# Patient Record
Sex: FEMALE | Race: WHITE | Employment: FULL TIME
[De-identification: names, ages, dates, MRNs, and addresses within clinical notes are randomized per-mention and may not be internally consistent; named-entity substitution may affect disease eponyms.]

---

## 2017-02-15 ENCOUNTER — TELEPHONE (OUTPATIENT)
Dept: OBGYN | Facility: CLINIC | Age: 42
End: 2017-02-15

## 2017-03-21 ENCOUNTER — OFFICE VISIT (OUTPATIENT)
Dept: OBGYN CLINIC | Age: 42
End: 2017-03-21
Payer: COMMERCIAL

## 2017-03-21 ENCOUNTER — HOSPITAL ENCOUNTER (OUTPATIENT)
Age: 42
Setting detail: SPECIMEN
Discharge: HOME | End: 2017-03-21
Payer: COMMERCIAL

## 2017-03-21 VITALS
SYSTOLIC BLOOD PRESSURE: 132 MMHG | WEIGHT: 194 LBS | DIASTOLIC BLOOD PRESSURE: 86 MMHG | HEIGHT: 61 IN | HEART RATE: 74 BPM | RESPIRATION RATE: 16 BRPM | BODY MASS INDEX: 36.63 KG/M2

## 2017-03-21 DIAGNOSIS — N76.0 ACUTE VAGINITIS: Primary | ICD-10-CM

## 2017-03-21 LAB
DIRECT EXAM: ABNORMAL
Lab: ABNORMAL
SPECIMEN DESCRIPTION: ABNORMAL
SPECIMEN DESCRIPTION: ABNORMAL
STATUS: ABNORMAL

## 2017-03-21 PROCEDURE — 87660 TRICHOMONAS VAGIN DIR PROBE: CPT

## 2017-03-21 PROCEDURE — 87591 N.GONORRHOEAE DNA AMP PROB: CPT

## 2017-03-21 PROCEDURE — 87480 CANDIDA DNA DIR PROBE: CPT

## 2017-03-21 PROCEDURE — 87491 CHLMYD TRACH DNA AMP PROBE: CPT

## 2017-03-21 PROCEDURE — 87510 GARDNER VAG DNA DIR PROBE: CPT

## 2017-03-21 PROCEDURE — 99213 OFFICE O/P EST LOW 20 MIN: CPT | Performed by: NURSE PRACTITIONER

## 2017-03-21 RX ORDER — CLOTRIMAZOLE AND BETAMETHASONE DIPROPIONATE 10; .64 MG/G; MG/G
CREAM TOPICAL
Qty: 1 TUBE | Refills: 1 | Status: SHIPPED | OUTPATIENT
Start: 2017-03-21 | End: 2017-05-22 | Stop reason: ALTCHOICE

## 2017-03-22 ENCOUNTER — TELEPHONE (OUTPATIENT)
Dept: OBGYN CLINIC | Age: 42
End: 2017-03-22

## 2017-03-22 LAB
C TRACH DNA GENITAL QL NAA+PROBE: NEGATIVE
N. GONORRHOEAE DNA: NEGATIVE

## 2017-03-22 RX ORDER — METRONIDAZOLE 500 MG/1
500 TABLET ORAL 2 TIMES DAILY
Qty: 14 TABLET | Refills: 0 | Status: SHIPPED | OUTPATIENT
Start: 2017-03-22 | End: 2017-03-29

## 2017-04-24 ENCOUNTER — TELEPHONE (OUTPATIENT)
Dept: BARIATRICS/WEIGHT MGMT | Age: 42
End: 2017-04-24

## 2017-05-22 ENCOUNTER — HOSPITAL ENCOUNTER (OUTPATIENT)
Age: 42
Setting detail: SPECIMEN
Discharge: HOME | End: 2017-05-22
Payer: COMMERCIAL

## 2017-05-22 ENCOUNTER — OFFICE VISIT (OUTPATIENT)
Dept: OBGYN CLINIC | Age: 42
End: 2017-05-22
Payer: COMMERCIAL

## 2017-05-22 ENCOUNTER — OFFICE VISIT (OUTPATIENT)
Dept: FAMILY MEDICINE CLINIC | Age: 42
End: 2017-05-22
Payer: COMMERCIAL

## 2017-05-22 ENCOUNTER — HOSPITAL ENCOUNTER (OUTPATIENT)
Age: 42
Discharge: HOME | End: 2017-05-22
Payer: COMMERCIAL

## 2017-05-22 VITALS
DIASTOLIC BLOOD PRESSURE: 89 MMHG | HEIGHT: 61 IN | HEART RATE: 81 BPM | TEMPERATURE: 97.3 F | WEIGHT: 200 LBS | BODY MASS INDEX: 37.76 KG/M2 | SYSTOLIC BLOOD PRESSURE: 132 MMHG | RESPIRATION RATE: 18 BRPM | OXYGEN SATURATION: 100 %

## 2017-05-22 VITALS
BODY MASS INDEX: 37.76 KG/M2 | WEIGHT: 200 LBS | HEIGHT: 61 IN | DIASTOLIC BLOOD PRESSURE: 89 MMHG | SYSTOLIC BLOOD PRESSURE: 132 MMHG | HEART RATE: 72 BPM

## 2017-05-22 DIAGNOSIS — Z12.39 ENCOUNTER FOR BREAST CANCER SCREENING OTHER THAN MAMMOGRAM: ICD-10-CM

## 2017-05-22 DIAGNOSIS — R73.9 HYPERGLYCEMIA: ICD-10-CM

## 2017-05-22 DIAGNOSIS — Z01.419 WELL WOMAN EXAM WITH ROUTINE GYNECOLOGICAL EXAM: Primary | ICD-10-CM

## 2017-05-22 DIAGNOSIS — R14.0 ABDOMINAL BLOATING: ICD-10-CM

## 2017-05-22 DIAGNOSIS — Z98.890 HISTORY OF ENDOMETRIAL ABLATION: ICD-10-CM

## 2017-05-22 DIAGNOSIS — R68.81 EARLY SATIETY: ICD-10-CM

## 2017-05-22 DIAGNOSIS — E66.9 OBESITY (BMI 30-39.9): ICD-10-CM

## 2017-05-22 DIAGNOSIS — Z23 NEED FOR TDAP VACCINATION: ICD-10-CM

## 2017-05-22 DIAGNOSIS — Z11.51 SPECIAL SCREENING EXAMINATION FOR HUMAN PAPILLOMAVIRUS (HPV): ICD-10-CM

## 2017-05-22 DIAGNOSIS — R92.2 DENSE BREAST: ICD-10-CM

## 2017-05-22 DIAGNOSIS — R73.03 PREDIABETES: ICD-10-CM

## 2017-05-22 DIAGNOSIS — R63.5 UNINTENDED WEIGHT GAIN: Primary | ICD-10-CM

## 2017-05-22 DIAGNOSIS — R63.5 UNINTENDED WEIGHT GAIN: ICD-10-CM

## 2017-05-22 PROBLEM — Z87.442 HISTORY OF KIDNEY STONES: Status: ACTIVE | Noted: 2017-05-22

## 2017-05-22 PROBLEM — E78.1 HYPERTRIGLYCERIDEMIA: Status: ACTIVE | Noted: 2017-05-22

## 2017-05-22 PROBLEM — E78.5 DYSLIPIDEMIA (HIGH LDL; LOW HDL): Status: ACTIVE | Noted: 2017-05-22

## 2017-05-22 LAB
ALBUMIN SERPL-MCNC: 4.4 G/DL (ref 3.5–5.2)
ALBUMIN/GLOBULIN RATIO: ABNORMAL (ref 1–2.5)
ALP BLD-CCNC: 66 U/L (ref 35–104)
ALT SERPL-CCNC: 15 U/L (ref 5–33)
ANION GAP SERPL CALCULATED.3IONS-SCNC: 12 MMOL/L (ref 9–17)
AST SERPL-CCNC: 12 U/L
BILIRUB SERPL-MCNC: 0.46 MG/DL (ref 0.3–1.2)
BUN BLDV-MCNC: 8 MG/DL (ref 6–20)
BUN/CREAT BLD: 13 (ref 9–20)
CALCIUM SERPL-MCNC: 9.5 MG/DL (ref 8.6–10.4)
CHLORIDE BLD-SCNC: 97 MMOL/L (ref 98–107)
CHOLESTEROL/HDL RATIO: 4.6
CHOLESTEROL: 226 MG/DL
CO2: 28 MMOL/L (ref 20–31)
CREAT SERPL-MCNC: 0.63 MG/DL (ref 0.5–0.9)
GFR AFRICAN AMERICAN: >60 ML/MIN
GFR NON-AFRICAN AMERICAN: >60 ML/MIN
GFR SERPL CREATININE-BSD FRML MDRD: ABNORMAL ML/MIN/{1.73_M2}
GFR SERPL CREATININE-BSD FRML MDRD: ABNORMAL ML/MIN/{1.73_M2}
GLUCOSE BLD-MCNC: 106 MG/DL (ref 70–99)
HBA1C MFR BLD: 6.1 %
HDLC SERPL-MCNC: 49 MG/DL
LDL CHOLESTEROL: 128 MG/DL (ref 0–130)
POTASSIUM SERPL-SCNC: 4.2 MMOL/L (ref 3.7–5.3)
SODIUM BLD-SCNC: 137 MMOL/L (ref 135–144)
TOTAL PROTEIN: 7.8 G/DL (ref 6.4–8.3)
TRIGL SERPL-MCNC: 246 MG/DL
TSH SERPL DL<=0.05 MIU/L-ACNC: 2.37 MIU/L (ref 0.3–5)
VLDLC SERPL CALC-MCNC: ABNORMAL MG/DL (ref 1–30)

## 2017-05-22 PROCEDURE — 99396 PREV VISIT EST AGE 40-64: CPT | Performed by: NURSE PRACTITIONER

## 2017-05-22 PROCEDURE — 84443 ASSAY THYROID STIM HORMONE: CPT

## 2017-05-22 PROCEDURE — 87624 HPV HI-RISK TYP POOLED RSLT: CPT

## 2017-05-22 PROCEDURE — 99203 OFFICE O/P NEW LOW 30 MIN: CPT | Performed by: FAMILY MEDICINE

## 2017-05-22 PROCEDURE — 36415 COLL VENOUS BLD VENIPUNCTURE: CPT

## 2017-05-22 PROCEDURE — 80053 COMPREHEN METABOLIC PANEL: CPT

## 2017-05-22 PROCEDURE — 80061 LIPID PANEL: CPT

## 2017-05-22 PROCEDURE — G0145 SCR C/V CYTO,THINLAYER,RESCR: HCPCS

## 2017-05-22 PROCEDURE — 83036 HEMOGLOBIN GLYCOSYLATED A1C: CPT | Performed by: FAMILY MEDICINE

## 2017-05-22 RX ORDER — METFORMIN HYDROCHLORIDE 500 MG/1
500 TABLET, EXTENDED RELEASE ORAL
Qty: 30 TABLET | Refills: 3 | Status: SHIPPED | OUTPATIENT
Start: 2017-05-22 | End: 2018-02-05

## 2017-05-22 ASSESSMENT — ENCOUNTER SYMPTOMS
SHORTNESS OF BREATH: 0
CONSTIPATION: 0
CHEST TIGHTNESS: 0
ABDOMINAL PAIN: 0
WHEEZING: 0
VOMITING: 0
NAUSEA: 0
ABDOMINAL DISTENTION: 0
DIARRHEA: 0
COUGH: 0

## 2017-05-22 ASSESSMENT — PATIENT HEALTH QUESTIONNAIRE - PHQ9
SUM OF ALL RESPONSES TO PHQ9 QUESTIONS 1 & 2: 0
1. LITTLE INTEREST OR PLEASURE IN DOING THINGS: 0
SUM OF ALL RESPONSES TO PHQ9 QUESTIONS 1 & 2: 0
SUM OF ALL RESPONSES TO PHQ QUESTIONS 1-9: 0
2. FEELING DOWN, DEPRESSED OR HOPELESS: 0
2. FEELING DOWN, DEPRESSED OR HOPELESS: 0
1. LITTLE INTEREST OR PLEASURE IN DOING THINGS: 0
SUM OF ALL RESPONSES TO PHQ QUESTIONS 1-9: 0

## 2017-05-23 LAB
CYTOLOGY REPORT: NORMAL
HPV SAMPLE: NORMAL
HPV SOURCE: NORMAL
HPV, GENOTYPE 16: NOT DETECTED
HPV, GENOTYPE 18: NOT DETECTED
HPV, HIGH RISK OTHER: NOT DETECTED
HPV, INTERPRETATION: NORMAL

## 2017-06-06 RX ORDER — METRONIDAZOLE 500 MG/1
500 TABLET ORAL 2 TIMES DAILY
Qty: 14 TABLET | Refills: 0 | Status: SHIPPED | OUTPATIENT
Start: 2017-06-06 | End: 2017-06-13

## 2017-06-22 ENCOUNTER — OFFICE VISIT (OUTPATIENT)
Dept: FAMILY MEDICINE CLINIC | Age: 42
End: 2017-06-22
Payer: COMMERCIAL

## 2017-06-22 ENCOUNTER — TELEPHONE (OUTPATIENT)
Dept: FAMILY MEDICINE CLINIC | Age: 42
End: 2017-06-22

## 2017-06-22 VITALS
SYSTOLIC BLOOD PRESSURE: 119 MMHG | BODY MASS INDEX: 37.16 KG/M2 | DIASTOLIC BLOOD PRESSURE: 80 MMHG | WEIGHT: 196.8 LBS | HEIGHT: 61 IN | TEMPERATURE: 98.2 F | OXYGEN SATURATION: 97 % | HEART RATE: 91 BPM

## 2017-06-22 DIAGNOSIS — E78.5 DYSLIPIDEMIA (HIGH LDL; LOW HDL): ICD-10-CM

## 2017-06-22 DIAGNOSIS — R73.03 PREDIABETES: ICD-10-CM

## 2017-06-22 DIAGNOSIS — E66.9 OBESITY (BMI 30-39.9): Primary | ICD-10-CM

## 2017-06-22 DIAGNOSIS — R73.9 HYPERGLYCEMIA: ICD-10-CM

## 2017-06-22 DIAGNOSIS — E78.1 HYPERTRIGLYCERIDEMIA: ICD-10-CM

## 2017-06-22 PROCEDURE — 99214 OFFICE O/P EST MOD 30 MIN: CPT | Performed by: FAMILY MEDICINE

## 2017-06-22 RX ORDER — PHENTERMINE HYDROCHLORIDE 37.5 MG/1
37.5 CAPSULE ORAL EVERY MORNING
Qty: 30 CAPSULE | Refills: 0 | Status: SHIPPED | OUTPATIENT
Start: 2017-06-22 | End: 2017-06-22 | Stop reason: SDUPTHER

## 2017-06-22 RX ORDER — PHENTERMINE HYDROCHLORIDE 37.5 MG/1
37.5 CAPSULE ORAL EVERY MORNING
Qty: 30 CAPSULE | Refills: 0 | Status: SHIPPED | OUTPATIENT
Start: 2017-06-22 | End: 2017-07-22

## 2017-06-22 ASSESSMENT — ENCOUNTER SYMPTOMS
COUGH: 0
CONSTIPATION: 0
ABDOMINAL PAIN: 0
DIARRHEA: 0
WHEEZING: 0
ABDOMINAL DISTENTION: 0
NAUSEA: 0
VOMITING: 0
CHEST TIGHTNESS: 0
SHORTNESS OF BREATH: 0

## 2017-10-06 ENCOUNTER — HOSPITAL ENCOUNTER (OUTPATIENT)
Dept: ULTRASOUND IMAGING | Age: 42
Discharge: HOME | End: 2017-10-06
Payer: COMMERCIAL

## 2017-10-06 ENCOUNTER — HOSPITAL ENCOUNTER (OUTPATIENT)
Dept: MAMMOGRAPHY | Age: 42
Discharge: HOME | End: 2017-10-06
Payer: COMMERCIAL

## 2017-10-06 DIAGNOSIS — R92.2 DENSE BREAST: ICD-10-CM

## 2017-10-06 DIAGNOSIS — Z01.419 WELL WOMAN EXAM WITH ROUTINE GYNECOLOGICAL EXAM: ICD-10-CM

## 2017-10-06 DIAGNOSIS — Z12.39 ENCOUNTER FOR BREAST CANCER SCREENING OTHER THAN MAMMOGRAM: ICD-10-CM

## 2017-10-06 PROCEDURE — G0279 TOMOSYNTHESIS, MAMMO: HCPCS

## 2018-02-05 ENCOUNTER — HOSPITAL ENCOUNTER (OUTPATIENT)
Age: 43
Discharge: HOME | End: 2018-02-05
Payer: COMMERCIAL

## 2018-02-05 ENCOUNTER — OFFICE VISIT (OUTPATIENT)
Dept: FAMILY MEDICINE CLINIC | Age: 43
End: 2018-02-05
Payer: COMMERCIAL

## 2018-02-05 VITALS
TEMPERATURE: 97.6 F | BODY MASS INDEX: 38.78 KG/M2 | DIASTOLIC BLOOD PRESSURE: 86 MMHG | WEIGHT: 205.4 LBS | SYSTOLIC BLOOD PRESSURE: 133 MMHG | OXYGEN SATURATION: 96 % | HEIGHT: 61 IN | HEART RATE: 80 BPM

## 2018-02-05 DIAGNOSIS — R63.5 UNINTENDED WEIGHT GAIN: ICD-10-CM

## 2018-02-05 DIAGNOSIS — E66.9 OBESITY (BMI 30-39.9): ICD-10-CM

## 2018-02-05 DIAGNOSIS — E78.1 HYPERTRIGLYCERIDEMIA: ICD-10-CM

## 2018-02-05 DIAGNOSIS — E78.5 DYSLIPIDEMIA (HIGH LDL; LOW HDL): ICD-10-CM

## 2018-02-05 DIAGNOSIS — R73.9 HYPERGLYCEMIA: ICD-10-CM

## 2018-02-05 DIAGNOSIS — E66.9 OBESITY (BMI 30-39.9): Primary | ICD-10-CM

## 2018-02-05 LAB
ALBUMIN SERPL-MCNC: 4 G/DL (ref 3.5–5.2)
ALBUMIN/GLOBULIN RATIO: ABNORMAL (ref 1–2.5)
ALP BLD-CCNC: 69 U/L (ref 35–104)
ALT SERPL-CCNC: 15 U/L (ref 5–33)
ANION GAP SERPL CALCULATED.3IONS-SCNC: 11 MMOL/L (ref 9–17)
AST SERPL-CCNC: 12 U/L
BILIRUB SERPL-MCNC: 0.37 MG/DL (ref 0.3–1.2)
BUN BLDV-MCNC: 11 MG/DL (ref 6–20)
BUN/CREAT BLD: ABNORMAL (ref 9–20)
CALCIUM SERPL-MCNC: 9.1 MG/DL (ref 8.6–10.4)
CHLORIDE BLD-SCNC: 100 MMOL/L (ref 98–107)
CO2: 27 MMOL/L (ref 20–31)
CREAT SERPL-MCNC: 0.7 MG/DL (ref 0.5–0.9)
GFR AFRICAN AMERICAN: >60 ML/MIN
GFR NON-AFRICAN AMERICAN: >60 ML/MIN
GFR SERPL CREATININE-BSD FRML MDRD: ABNORMAL ML/MIN/{1.73_M2}
GFR SERPL CREATININE-BSD FRML MDRD: ABNORMAL ML/MIN/{1.73_M2}
GLUCOSE BLD-MCNC: 108 MG/DL (ref 70–99)
POTASSIUM SERPL-SCNC: 4 MMOL/L (ref 3.7–5.3)
SODIUM BLD-SCNC: 138 MMOL/L (ref 135–144)
TOTAL PROTEIN: 7.8 G/DL (ref 6.4–8.3)
TSH SERPL DL<=0.05 MIU/L-ACNC: 1.23 MIU/L (ref 0.3–5)

## 2018-02-05 PROCEDURE — 80053 COMPREHEN METABOLIC PANEL: CPT

## 2018-02-05 PROCEDURE — 36415 COLL VENOUS BLD VENIPUNCTURE: CPT

## 2018-02-05 PROCEDURE — 83036 HEMOGLOBIN GLYCOSYLATED A1C: CPT

## 2018-02-05 PROCEDURE — 84443 ASSAY THYROID STIM HORMONE: CPT

## 2018-02-05 PROCEDURE — 99214 OFFICE O/P EST MOD 30 MIN: CPT | Performed by: FAMILY MEDICINE

## 2018-02-05 RX ORDER — PHENTERMINE HYDROCHLORIDE 37.5 MG/1
37.5 TABLET ORAL
Qty: 30 TABLET | Refills: 0 | Status: SHIPPED | OUTPATIENT
Start: 2018-02-05 | End: 2018-03-14 | Stop reason: SDUPTHER

## 2018-02-05 ASSESSMENT — ENCOUNTER SYMPTOMS
VOMITING: 0
COUGH: 0
DIARRHEA: 0
ABDOMINAL DISTENTION: 0
WHEEZING: 0
NAUSEA: 0
SHORTNESS OF BREATH: 0
CONSTIPATION: 0
CHEST TIGHTNESS: 0
ABDOMINAL PAIN: 0

## 2018-02-05 NOTE — PATIENT INSTRUCTIONS
loss goals. ¨ A dietitian can help you make healthy changes in your diet. ¨ An exercise specialist or  can help you develop a safe and effective exercise program.  ¨ A counselor or psychiatrist can help you cope with issues such as depression, anxiety, or family problems that can make it hard to focus on weight loss. · Consider joining a support group for people who are trying to lose weight. Your doctor can suggest groups in your area. Where can you learn more? Go to https://BodBot.GMG33. org and sign in to your ShopItToMe account. Enter T470 in the BrandMaker box to learn more about \"Starting a Weight Loss Plan: Care Instructions. \"     If you do not have an account, please click on the \"Sign Up Now\" link. Current as of: October 13, 2016  Content Version: 11.5  © 6738-1040 Healthwise, Incorporated. Care instructions adapted under license by [facility] ([facility]). If you have questions about a medical condition or this instruction, always ask your healthcare professional. [address] any warranty or liability for your use of this information.

## 2018-02-05 NOTE — PROGRESS NOTES
Visit Information    Have you changed or started any medications since your last visit including any over-the-counter medicines, vitamins, or herbal medicines? no   Have you stopped taking any of your medications? Is so, why? -  no  Are you having any side effects from any of your medications? - no    Have you seen any other physician or provider since your last visit?  no   Have you had any other diagnostic tests since your last visit?  no   Have you been seen in the emergency room and/or had an admission in a hospital since we last saw you?  no   Have you had your routine dental cleaning in the past 6 months?  yes -      Do you have an active MyChart account? If no, what is the barrier?   Yes    Patient Care Team:  Luis Ron MD as PCP - General (Family Medicine)  Luis Ron MD as PCP - S Attributed Provider  Shakir Katz CNP as Nurse Practitioner (Certified Nurse Practitioner)  Bulmaro Jacinto MD as Consulting Physician (Urology)    Medical History Review  Past Medical, Family, and Social History reviewed and does contribute to the patient presenting condition    Health Maintenance   Topic Date Due    Flu vaccine (1) 09/01/2017    DTaP/Tdap/Td vaccine (1 - Tdap) 07/03/2022 (Originally 2/15/2017)    A1C test (Diabetic or Prediabetic)  05/22/2018    Lipid screen  05/22/2022    Cervical cancer screen  05/22/2022    HIV screen  Completed
Hypertriglyceridemia  Attempt to lose weight  - phentermine (ADIPEX-P) 37.5 MG tablet; Take 1 tablet by mouth every morning (before breakfast) for 30 days. Dispense: 30 tablet; Refill: 0      Orders Placed This Encounter   Procedures    Hemoglobin A1C     Standing Status:   Future     Standing Expiration Date:   2/5/2019    Comprehensive Metabolic Panel     Standing Status:   Future     Standing Expiration Date:   2/5/2019    TSH without Reflex     Standing Status:   Future     Standing Expiration Date:   2/6/2019       Orders Placed This Encounter   Medications    phentermine (ADIPEX-P) 37.5 MG tablet     Sig: Take 1 tablet by mouth every morning (before breakfast) for 30 days. Dispense:  30 tablet     Refill:  0       Medications Discontinued During This Encounter   Medication Reason    metFORMIN (GLUCOPHAGE XR) 500 MG extended release tablet Patient Choice       Crystal received counseling on the following healthy behaviors: nutrition, exercise and medication adherence, weight loss  Reviewed prior labs and health maintenance  Continue current medications, diet and exercise. Discussed use, benefit, and side effects of prescribed medications. Barriers to medication compliance addressed. Patient given educational materials - see patient instructions  Was a self-tracking handout given in paper form or via [employer]? No:    Requested Prescriptions     Signed Prescriptions Disp Refills    phentermine (ADIPEX-P) 37.5 MG tablet 30 tablet 0     Sig: Take 1 tablet by mouth every morning (before breakfast) for 30 days. All patient questions answered. Patient voiced understanding. Quality Measures    Body mass index is 38.81 kg/m². Elevated. Weight control planned discussed conventional weight loss, start ADipex,  and Healthy diet and regular exercise.     BP: 133/86 Blood pressure is normal. Treatment plan consists of Dietary Sodium Restriction, avoid cofee and No treatment change

## 2018-02-06 DIAGNOSIS — R63.5 UNINTENDED WEIGHT GAIN: ICD-10-CM

## 2018-02-06 DIAGNOSIS — R73.03 PREDIABETES: Primary | ICD-10-CM

## 2018-02-06 DIAGNOSIS — E66.9 OBESITY (BMI 30-39.9): ICD-10-CM

## 2018-02-06 DIAGNOSIS — R73.9 HYPERGLYCEMIA: ICD-10-CM

## 2018-02-06 LAB
ESTIMATED AVERAGE GLUCOSE: 131 MG/DL
HBA1C MFR BLD: 6.2 % (ref 4–6)

## 2018-02-06 RX ORDER — METFORMIN HYDROCHLORIDE 500 MG/1
500 TABLET, EXTENDED RELEASE ORAL
Qty: 90 TABLET | Refills: 3 | Status: SHIPPED | OUTPATIENT
Start: 2018-02-06 | End: 2018-03-14 | Stop reason: SINTOL

## 2018-02-06 NOTE — PROGRESS NOTES
Mychart comment sent to patient. Glucose 108, mildly increased. Metformin would be indicated.   Other labs within normal limits pending A1c

## 2018-03-13 ENCOUNTER — TELEPHONE (OUTPATIENT)
Dept: FAMILY MEDICINE CLINIC | Age: 43
End: 2018-03-13

## 2018-03-14 ENCOUNTER — OFFICE VISIT (OUTPATIENT)
Dept: FAMILY MEDICINE CLINIC | Age: 43
End: 2018-03-14
Payer: COMMERCIAL

## 2018-03-14 VITALS
HEIGHT: 61 IN | SYSTOLIC BLOOD PRESSURE: 141 MMHG | TEMPERATURE: 97.4 F | WEIGHT: 195.2 LBS | DIASTOLIC BLOOD PRESSURE: 95 MMHG | HEART RATE: 101 BPM | OXYGEN SATURATION: 97 % | BODY MASS INDEX: 36.85 KG/M2

## 2018-03-14 DIAGNOSIS — E78.1 HYPERTRIGLYCERIDEMIA: ICD-10-CM

## 2018-03-14 DIAGNOSIS — R73.03 PREDIABETES: ICD-10-CM

## 2018-03-14 DIAGNOSIS — R03.0 ELEVATED BLOOD PRESSURE READING: ICD-10-CM

## 2018-03-14 DIAGNOSIS — R73.9 HYPERGLYCEMIA: ICD-10-CM

## 2018-03-14 DIAGNOSIS — E66.9 OBESITY (BMI 30-39.9): Primary | ICD-10-CM

## 2018-03-14 DIAGNOSIS — E78.5 DYSLIPIDEMIA (HIGH LDL; LOW HDL): ICD-10-CM

## 2018-03-14 PROCEDURE — 99214 OFFICE O/P EST MOD 30 MIN: CPT | Performed by: FAMILY MEDICINE

## 2018-03-14 RX ORDER — PHENTERMINE HYDROCHLORIDE 37.5 MG/1
37.5 TABLET ORAL
Qty: 30 TABLET | Refills: 0 | Status: SHIPPED | OUTPATIENT
Start: 2018-03-14 | End: 2018-03-14 | Stop reason: SDUPTHER

## 2018-03-14 RX ORDER — PHENTERMINE HYDROCHLORIDE 37.5 MG/1
37.5 TABLET ORAL
Qty: 30 TABLET | Refills: 0 | Status: SHIPPED | OUTPATIENT
Start: 2018-03-14 | End: 2018-04-13

## 2018-03-14 ASSESSMENT — ENCOUNTER SYMPTOMS
COUGH: 0
CHEST TIGHTNESS: 0
ABDOMINAL PAIN: 1
WHEEZING: 0
NAUSEA: 1
ABDOMINAL DISTENTION: 0
CONSTIPATION: 0
SHORTNESS OF BREATH: 0
BLOOD IN STOOL: 0
VOMITING: 1
DIARRHEA: 1

## 2018-03-14 ASSESSMENT — PATIENT HEALTH QUESTIONNAIRE - PHQ9
SUM OF ALL RESPONSES TO PHQ9 QUESTIONS 1 & 2: 0
1. LITTLE INTEREST OR PLEASURE IN DOING THINGS: 0
2. FEELING DOWN, DEPRESSED OR HOPELESS: 0
SUM OF ALL RESPONSES TO PHQ QUESTIONS 1-9: 0

## 2018-03-14 NOTE — PROGRESS NOTES
Chief Complaint   Patient presents with    Weight Management     adipex #2         Guevara Chavez  here today for follow up on chronic medical problems, go over labs and/or diagnostic studies, and medication refills. Weight Management (adipex #2)      HPI    Wants to lose weight. Guevara Chavez was started on Adipex  In addition to the medication, patient also using diet and exercise as follows:  -diet: Low carb, low fat, smaller portions, has been eating more fruits and vegetables  -exercise: walking more and she has a very active job as an [employer]    Medication side effects: None. Patient denies anorexia, nausea, vomiting, abdominal pain, involuntary weight loss, palpitations, insomnia, irritability, anxiety, headache and tremor. Patient informed that when prescribed a controlled substance for weight loss, the provider is required by law to see the patient for an appointment every thirty days. This is neccessary to record the weight and blood pressure and to assess patient's efforts to lose weight, and to ensure there are no contraindications or adverse effects. High BP today  Celia denies chest pain, chest pressure/discomfort, claudication, dyspnea, exertional chest pressure/discomfort, fatigue, irregular heart beat, lower extremity edema, near-syncope, orthopnea, palpitations, paroxysmal nocturnal dyspnea, syncope and tachypnea      Celia reports she run out of Adipex about 1 week ago and she missed one appointment with me. She took Metformin last night and today she feels nauseated, has stomach pains and diarrhea. Diarrhea woke her up last night. Celia says it happened before . with  Metformin     BP was good before.   Patient says that while she was on Adipex, she had her blood pressure checked at work and it was around 130s over 80s  BP Readings from Last 3 Encounters:   03/14/18 (!) 141/95   02/05/18 133/86   06/22/17 119/80      Mild tachycardia  Pulse Readings from Last 3 Encounters: given: Yes        Family History   Problem Relation Age of Onset    Diabetes Father     Bipolar Disorder Sister     Lung Cancer Paternal Grandmother     Heart Attack Paternal Grandfather              -rest of complaints with corresponding details per ROS    The patient's past medical, surgical, social, and family history as well as her current medications and allergies were reviewed as documented in today's encounter. Review of Systems   Constitutional: Negative for activity change, appetite change, chills, diaphoresis, fatigue, fever and unexpected weight change. Respiratory: Negative for cough, chest tightness, shortness of breath and wheezing. Cardiovascular: Negative for chest pain, palpitations and leg swelling. Gastrointestinal: Positive for abdominal pain, diarrhea, nausea and vomiting. Negative for abdominal distention, blood in stool and constipation. Endocrine: Positive for heat intolerance. Negative for cold intolerance, polydipsia, polyphagia and polyuria. Neurological: Negative for tremors, weakness, light-headedness and headaches. Physical Exam   Constitutional: She is oriented to person, place, and time. She appears well-developed and well-nourished. No distress. HENT:   Head: Normocephalic and atraumatic. Mouth/Throat: Oropharynx is clear and moist. No oropharyngeal exudate. Eyes: Conjunctivae and EOM are normal. Right eye exhibits no discharge. Left eye exhibits no discharge. No scleral icterus. Neck: Normal range of motion. Neck supple. No thyromegaly present. Cardiovascular: Normal rate, regular rhythm, normal heart sounds and intact distal pulses. Central heart rate during the exam, 80   Pulmonary/Chest: Effort normal and breath sounds normal. No respiratory distress. She has no wheezes. She has no rales. She exhibits no tenderness. Abdominal: Soft. Bowel sounds are normal. She exhibits no distension. There is no tenderness. Obese abdomen.

## 2018-03-14 NOTE — PATIENT INSTRUCTIONS
[place], [facility] disclaims any warranty or liability for your use of this information. Patient Education        Low Sodium Diet (2,000 Milligram): Care Instructions  Your Care Instructions    Too much sodium causes your body to hold on to extra water. This can raise your blood pressure and force your heart and kidneys to work harder. In very serious cases, this could cause you to be put in the hospital. It might even be life-threatening. By limiting sodium, you will feel better and lower your risk of serious problems. The most common source of sodium is salt. People get most of the salt in their diet from canned, prepared, and packaged foods. Fast food and restaurant meals also are very high in sodium. Your doctor will probably limit your sodium to less than 2,000 milligrams (mg) a day. This limit counts all the sodium in prepared and packaged foods and any salt you add to your food. Follow-up care is a key part of your treatment and safety. Be sure to make and go to all appointments, and call your doctor if you are having problems. It's also a good idea to know your test results and keep a list of the medicines you take. How can you care for yourself at home? Read food labels  · Read labels on cans and food packages. The labels tell you how much sodium is in each serving. Make sure that you look at the serving size. If you eat more than the serving size, you have eaten more sodium. · Food labels also tell you the Percent Daily Value for sodium. Choose products with low Percent Daily Values for sodium. · Be aware that sodium can come in forms other than salt, including monosodium glutamate (MSG), sodium citrate, and sodium bicarbonate (baking soda). MSG is often added to Asian food. When you eat out, you can sometimes ask for food without MSG or added salt.   Buy low-sodium foods  · Buy foods that are labeled \"unsalted\" (no salt added), \"sodium-free\" (less than 5 mg of sodium per serving), or low-sodium. ¨ Canned vegetables, unless labeled sodium-free or low-sodium. ¨ Western Samia fries, pizza, tacos, and other fast foods. ¨ Pickles, olives, ketchup, and other condiments, especially soy sauce, unless labeled sodium-free or low-sodium. Where can you learn more? Go to https://chpepiceweb.Rooftop Media. org and sign in to your Shelf.com account. Enter T412 in the [facility] box to learn more about \"Low Sodium Diet (2,000 Milligram): Care Instructions. \"     If you do not have an account, please click on the \"Sign Up Now\" link. Current as of: May 12, 2017  Content Version: 11.5  © 2029-5814 TurningArt. Care instructions adapted under license by [facility] ([facility]). If you have questions about a medical condition or this instruction, always ask your healthcare professional. [place] [address] any warranty or liability for your use of this information. Patient Education        How to Read a Food Label to Limit Sodium: Care Instructions  Your Care Instructions  Sodium causes your body to hold on to extra water. This can raise your blood pressure and force your heart and kidneys to work harder. In very serious cases, this could cause you to be put in the hospital. It might even be life-threatening. By limiting sodium, you will feel better and lower your risk of serious problems. Processed foods, fast food, and restaurant foods are the major sources of dietary sodium. The most common name for sodium is salt. Try to limit how much sodium you eat to less than 2,300 milligrams (mg) a day. If you limit your sodium to 1,500 mg a day, you can lower your blood pressure even more. This limit counts all the salt that you eat in foods you cook or in packaged foods. Keep a list of everything you eat and drink. Follow-up care is a key part of your treatment and safety. Be sure to make and go to all appointments, and call your doctor if you are having problems.  It's also a good idea to know your test results and keep a list of the medicines you take. How can you care for yourself at home? Read ingredient lists on food labels  · Read the list of ingredients on food labels to help you find how much sodium is in a food. The label lists the ingredients in a food in descending order (from the most to the least). If salt or sodium is high on the list, there may be a lot of sodium in the food. · Know that sodium has different names. Sodium is also called monosodium glutamate (MSG, common in [facility] food), sodium citrate, sodium alginate, sodium hydroxide, and sodium phosphate. Read Nutrition Facts labels  · On most foods, there is a Nutrition Facts label. This will tell you how much sodium is in one serving of food. Look at both the serving size and the sodium amount. The serving size is located at the top of the label, usually right under the \"Nutrition Facts\" title. The amount of sodium is given in the list under the title. It is given in milligrams (mg). ¨ Check the serving size carefully. A single serving is often very small, and you may eat more than one serving. If this is the case, you will eat more sodium than listed on the label. For example, if the serving size for a canned soup is 1 cup and the sodium amount is 470 mg, if you have 2 cups you will eat 940 mg of sodium. · The nutrition facts for fresh fruits and vegetables are not listed on the food. They may be listed somewhere in the store. These foods usually have no sodium or low sodium. · The Nutrition Facts label also gives you the Percent Daily Value for sodium. This is how much of the recommended amount of sodium a serving contains. The daily value for sodium is less than 2,300 mg. So if the Percent Daily Value says 50%, this means one serving is giving you half of this, or 1,150 mg. Buy low-sodium foods  · Look for foods that are made with less sodium. Watch for the following words on the label.   ¨ \"Unsalted\" means there is no sodium added to the food. But there may be sodium already in the food naturally. ¨ \"Sodium-free\" means a serving has less than 5 mg of sodium. ¨ \"Very low sodium\" means a serving has 35 mg or less of sodium. ¨ \"Low-sodium\" means a serving has 140 mg or less of sodium. · \"Reduced-sodium\" means that there is 25% less sodium than what the food normally has. This is still usually too much sodium. Try not to buy foods with this on the label. · Buy fresh vegetables, or frozen vegetables without added sauces. Buy low-sodium versions of canned vegetables, soups, and other canned goods. Where can you learn more? Go to https://YebolpeeyesFinder.Global Education Learning. org and sign in to your F[employer] account. Enter 26 260866 in the [facility] box to learn more about \"How to Read a Food Label to Limit Sodium: Care Instructions. \"     If you do not have an account, please click on the \"Sign Up Now\" link. Current as of: May 12, 2017  Content Version: 11.5  © 7017-2002 [employer]. Care instructions adapted under license by [employer]. If you have questions about a medical condition or this instruction, always ask your healthcare professional. Maria Fernanda Rainey any warranty or liability for your use of this information.

## 2018-03-14 NOTE — PROGRESS NOTES
Visit Information    Have you changed or started any medications since your last visit including any over-the-counter medicines, vitamins, or herbal medicines? no   Have you stopped taking any of your medications? Is so, why? -  no  Are you having any side effects from any of your medications? - no    Have you seen any other physician or provider since your last visit?  no   Have you had any other diagnostic tests since your last visit?  no   Have you been seen in the emergency room and/or had an admission in a hospital since we last saw you?  no   Have you had your routine dental cleaning in the past 6 months?  yes -      Do you have an active MyChart account? If no, what is the barrier?   Yes    Patient Care Team:  Gina Pretty MD as PCP - General (Family Medicine)  Gina Pretty MD as PCP - S Attributed Provider  Jean Claude Jeffries CNP as Nurse Practitioner (Certified Nurse Practitioner)  Chetan Ornelas MD as Consulting Physician (Urology)    Medical History Review  Past Medical, Family, and Social History reviewed and does contribute to the patient presenting condition    Health Maintenance   Topic Date Due    Flu vaccine (1) 08/01/2018 (Originally 9/1/2017)    DTaP/Tdap/Td vaccine (1 - Tdap) 07/03/2022 (Originally 2/15/2017)    A1C test (Diabetic or Prediabetic)  02/05/2019    Lipid screen  05/22/2022    Cervical cancer screen  05/22/2022    HIV screen  Completed

## 2018-05-03 ENCOUNTER — TELEPHONE (OUTPATIENT)
Dept: FAMILY MEDICINE CLINIC | Age: 43
End: 2018-05-03

## 2018-05-03 DIAGNOSIS — R07.81 PAIN IN RIB: ICD-10-CM

## 2018-05-03 DIAGNOSIS — J20.9 ACUTE BRONCHITIS DUE TO INFECTION: Primary | ICD-10-CM

## 2018-05-03 RX ORDER — BENZONATATE 100 MG/1
100 CAPSULE ORAL 3 TIMES DAILY PRN
Qty: 21 CAPSULE | Refills: 0 | Status: SHIPPED | OUTPATIENT
Start: 2018-05-03 | End: 2018-05-10

## 2018-05-03 RX ORDER — AZITHROMYCIN 250 MG/1
TABLET, FILM COATED ORAL
Qty: 6 TABLET | Refills: 0 | Status: SHIPPED | OUTPATIENT
Start: 2018-05-03 | End: 2018-05-08

## 2018-05-04 ENCOUNTER — HOSPITAL ENCOUNTER (OUTPATIENT)
Age: 43
Discharge: HOME | End: 2018-05-06
Payer: COMMERCIAL

## 2018-05-04 ENCOUNTER — HOSPITAL ENCOUNTER (OUTPATIENT)
Dept: GENERAL RADIOLOGY | Age: 43
Discharge: HOME | End: 2018-05-06
Payer: COMMERCIAL

## 2018-05-04 DIAGNOSIS — R07.81 PAIN IN RIB: ICD-10-CM

## 2018-05-04 DIAGNOSIS — J20.9 ACUTE BRONCHITIS DUE TO INFECTION: ICD-10-CM

## 2018-05-04 PROCEDURE — 71046 X-RAY EXAM CHEST 2 VIEWS: CPT

## 2018-06-28 ENCOUNTER — OFFICE VISIT (OUTPATIENT)
Dept: FAMILY MEDICINE CLINIC | Age: 43
End: 2018-06-28
Payer: COMMERCIAL

## 2018-06-28 VITALS
TEMPERATURE: 98.1 F | BODY MASS INDEX: 36.63 KG/M2 | HEIGHT: 61 IN | HEART RATE: 77 BPM | SYSTOLIC BLOOD PRESSURE: 124 MMHG | DIASTOLIC BLOOD PRESSURE: 88 MMHG | OXYGEN SATURATION: 100 % | WEIGHT: 194 LBS

## 2018-06-28 DIAGNOSIS — R03.0 ELEVATED BLOOD PRESSURE READING: ICD-10-CM

## 2018-06-28 DIAGNOSIS — R73.9 HYPERGLYCEMIA: ICD-10-CM

## 2018-06-28 DIAGNOSIS — E78.5 DYSLIPIDEMIA (HIGH LDL; LOW HDL): ICD-10-CM

## 2018-06-28 DIAGNOSIS — R06.83 SNORING: ICD-10-CM

## 2018-06-28 DIAGNOSIS — E66.9 OBESITY (BMI 30-39.9): Primary | ICD-10-CM

## 2018-06-28 DIAGNOSIS — R73.03 PREDIABETES: ICD-10-CM

## 2018-06-28 LAB — HBA1C MFR BLD: 6 %

## 2018-06-28 PROCEDURE — 83036 HEMOGLOBIN GLYCOSYLATED A1C: CPT | Performed by: FAMILY MEDICINE

## 2018-06-28 PROCEDURE — 99214 OFFICE O/P EST MOD 30 MIN: CPT | Performed by: FAMILY MEDICINE

## 2018-06-28 ASSESSMENT — ENCOUNTER SYMPTOMS
VOMITING: 0
COUGH: 0
ABDOMINAL PAIN: 0
NAUSEA: 0
CONSTIPATION: 0
CHEST TIGHTNESS: 0
ABDOMINAL DISTENTION: 0
WHEEZING: 0
SHORTNESS OF BREATH: 0
DIARRHEA: 0

## 2018-06-28 NOTE — PROGRESS NOTES
Addressed during office visit today, A1c 6, improved from prior of 6.2, prediabetes, continue treatment recommended during the office visit

## 2018-06-28 NOTE — PATIENT INSTRUCTIONS
Patient Education        Starting a Weight Loss Plan: Care Instructions  Your Care Instructions    If you are thinking about losing weight, it can be hard to know where to start. Your doctor can help you set up a weight loss plan that best meets your needs. You may want to take a class on nutrition or exercise, or join a weight loss support group. If you have questions about how to make changes to your eating or exercise habits, ask your doctor about seeing a registered dietitian or an exercise specialist.  It can be a big challenge to lose weight. But you do not have to make huge changes at once. Make small changes, and stick with them. When those changes become habit, add a few more changes. If you do not think you are ready to make changes right now, try to pick a date in the future. Make an appointment to see your doctor to discuss whether the time is right for you to start a plan. Follow-up care is a key part of your treatment and safety. Be sure to make and go to all appointments, and call your doctor if you are having problems. It's also a good idea to know your test results and keep a list of the medicines you take. How can you care for yourself at home? · Set realistic goals. Many people expect to lose much more weight than is likely. A weight loss of 5% to 10% of your body weight may be enough to improve your health. · Get family and friends involved to provide support. Talk to them about why you are trying to lose weight, and ask them to help. They can help by participating in exercise and having meals with you, even if they may be eating something different. · Find what works best for you. If you do not have time or do not like to cook, a program that offers meal replacement bars or shakes may be better for you.  Or if you like to prepare meals, finding a plan that includes daily menus and recipes may be best.  · Ask your doctor about other health professionals who can help you achieve your weight make and go to all appointments, and call your doctor if you are having problems. It's also a good idea to know your test results and keep a list of the medicines you take. How can you care for yourself at home? · Watch your weight. A healthy weight helps your body use insulin properly. · Limit the amount of calories, sweets, and unhealthy fat you eat. Ask your doctor if you should see a dietitian. A registered dietitian can help you create meal plans that fit your lifestyle. · Get at least 30 minutes of exercise on most days of the week. Exercise helps control your blood sugar. It also helps you maintain a healthy weight. Walking is a good choice. You also may want to do other activities, such as running, swimming, cycling, or playing tennis or team sports. · Do not smoke. Smoking can make prediabetes worse. If you need help quitting, talk to your doctor about stop-smoking programs and medicines. These can increase your chances of quitting for good. · If your doctor prescribed medicines, take them exactly as prescribed. Call your doctor if you think you are having a problem with your medicine. You will get more details on the specific medicines your doctor prescribes. When should you call for help? Watch closely for changes in your health, and be sure to contact your doctor if:    · You have any symptoms of diabetes. These may include:  ¨ Being thirsty more often. ¨ Urinating more. ¨ Being hungrier. ¨ Losing weight. ¨ Being very tired. ¨ Having blurry vision.     · You have a wound that will not heal.     · You have an infection that will not go away.     · You have problems with your blood pressure.     · You want more information about diabetes and how you can keep from getting it. Where can you learn more? Go to https://genia.Bionic Panda Games. org and sign in to your Clickslide account. Enter I222 in the DZZOM box to learn more about \"Prediabetes: Care Instructions. \"     If

## 2018-06-28 NOTE — PROGRESS NOTES
she lost 1 pound, 3 months ago. Patient lost a total of 9 -10 pounds in 4 months  Wt Readings from Last 3 Encounters:   06/28/18 194 lb (88 kg)   03/14/18 195 lb 3.2 oz (88.5 kg)   02/05/18 205 lb 6.4 oz (93.2 kg)       Allergies   Allergen Reactions    Adipex-P [Phentermine Hcl]      palpitations     Metformin And Related      N, V, diarrhea abdominal cramps         Current Outpatient Prescriptions   Medication Sig Dispense Refill     No current facility-administered medications for this visit. Social History     Social History    Marital status: Single     Spouse name: N/A    Number of children: N/A    Years of education: N/A     Occupational History    Not on file. Social History Main Topics    Smoking status: Never Smoker    Smokeless tobacco: Never Used    Alcohol use 0.0 oz/week      Comment: Socially    Drug use: No    Sexual activity: Yes     Partners: Male     Birth control/ protection: Surgical      Comment: TL     Other Topics Concern    Not on file     Social History Narrative    No narrative on file     Counseling given: Yes        Family History   Problem Relation Age of Onset    Diabetes Father     Bipolar Disorder Sister     Lung Cancer Paternal Grandmother     Heart Attack Paternal Grandfather               [x] Negative depression screening. PHQ Scores 3/14/2018 5/22/2017 5/22/2017 5/20/2016 5/20/2016   PHQ2 Score 0 0 0 0 0   PHQ9 Score 0 0 0 0 0     Interpretation of Total Score Depression Severity: 1-4 = Minimal depression, 5-9 = Mild depression, 10-14 = Moderate depression, 15-19 = Moderately severe depression, 20-27 = Severe depression    The patient's past medical, surgical, social, and family history as well as her current medications and allergies were reviewed as documented in today's encounter.       Rest of complaints with corresponding details per ROS    Review of Systems   Constitutional: Negative for activity change, appetite change, chills, diaphoresis, mL/min/1.73sq m              eGFR calculated using average adult body mass. Additional eGFR calculator   available at:        [email or web address]        Performed at [address] [place] [address] Dr Shona Salgado. [address]   ([phone]      GFR Staging 02/05/2018 NOT REPORTED   Final    TSH 02/05/2018 1.23  0.30 - 5.00 mIU/L Final    Comment: Performed at [address] [place] [address] Dr Shona Salgado. [address]   [phone]         Lab Results   Component Value Date    WBC 6.0 05/20/2016    HGB 12.7 05/20/2016    HCT 38.4 05/20/2016    MCV 89.7 05/20/2016     05/20/2016            Lab Results   Component Value Date    CHOL 226 (H) 05/22/2017    CHOL 204 (H) 04/06/2013     Lab Results   Component Value Date    TRIG 246 (H) 05/22/2017    TRIG 185 (H) 04/06/2013     Lab Results   Component Value Date    HDL 49 05/22/2017    HDL 46 04/06/2013     Lab Results   Component Value Date    LDLCHOLESTEROL 128 05/22/2017    LDLCHOLESTEROL 121 (H) 04/06/2013     Lab Results   Component Value Date    VLDL NOT REPORTED 05/22/2017    VLDL NOT REPORTED 04/06/2013     Lab Results   Component Value Date    CHOLHDLRATIO 4.6 05/22/2017    CHOLHDLRATIO 4.4 04/06/2013         No results found for: [id number]  No results found for: FOLATE  Lab Results   Component Value Date    VITD25 34.0 04/06/2013           ASSESSMENT AND PLAN      1. Obesity (BMI 30-39. 9)  Stable  We discussed other medicamentos options and she is agreeable to try Victoza    - Insulin Pen Needle 31G X 5 MM MISC; 1 each by Does not apply route daily TO BE USED with VICTOZA, DAILY  Dispense: 100 each; Refill: 3  -start Liraglutide (VICTOZA) 18 MG/3ML SOPN SC injection; Initial: 0.6 mg once daily SC for 1 week; then increase to 1.2 mg once daily.  Call for refill  Dispense: 3 pen; Refill: 0    If not covered, will try Byetta or Wellbutrin  No sleep study at this time, but we can consider it , discussed the

## 2018-07-02 PROBLEM — R03.0 ELEVATED BLOOD PRESSURE READING: Status: ACTIVE | Noted: 2018-07-02

## 2018-07-02 PROBLEM — R06.83 SNORING: Status: ACTIVE | Noted: 2018-07-02

## 2018-08-24 DIAGNOSIS — R73.9 HYPERGLYCEMIA: ICD-10-CM

## 2018-08-24 DIAGNOSIS — R73.03 PREDIABETES: ICD-10-CM

## 2018-08-24 DIAGNOSIS — E66.9 OBESITY (BMI 30-39.9): ICD-10-CM

## 2018-08-29 ENCOUNTER — TELEPHONE (OUTPATIENT)
Dept: OBGYN CLINIC | Age: 43
End: 2018-08-29

## 2018-08-30 RX ORDER — METRONIDAZOLE 500 MG/1
500 TABLET ORAL 2 TIMES DAILY
Qty: 14 TABLET | Refills: 0 | Status: SHIPPED | OUTPATIENT
Start: 2018-08-30 | End: 2018-09-06

## 2018-09-21 ENCOUNTER — HOSPITAL ENCOUNTER (OUTPATIENT)
Age: 43
Setting detail: SPECIMEN
Discharge: HOME | End: 2018-09-21
Payer: COMMERCIAL

## 2018-09-21 ENCOUNTER — OFFICE VISIT (OUTPATIENT)
Dept: OBGYN CLINIC | Age: 43
End: 2018-09-21
Payer: COMMERCIAL

## 2018-09-21 VITALS
DIASTOLIC BLOOD PRESSURE: 78 MMHG | SYSTOLIC BLOOD PRESSURE: 116 MMHG | WEIGHT: 195 LBS | BODY MASS INDEX: 36.82 KG/M2 | HEIGHT: 61 IN | HEART RATE: 80 BPM

## 2018-09-21 DIAGNOSIS — N89.8 VAGINAL ODOR: ICD-10-CM

## 2018-09-21 DIAGNOSIS — Z01.419 CERVICAL SMEAR, AS PART OF ROUTINE GYNECOLOGICAL EXAMINATION: ICD-10-CM

## 2018-09-21 DIAGNOSIS — Z11.51 SCREENING FOR HUMAN PAPILLOMAVIRUS: ICD-10-CM

## 2018-09-21 DIAGNOSIS — Z01.411 ENCOUNTER FOR GYNECOLOGICAL EXAMINATION (GENERAL) (ROUTINE) WITH ABNORMAL FINDINGS: Primary | ICD-10-CM

## 2018-09-21 DIAGNOSIS — Z12.31 ENCOUNTER FOR SCREENING MAMMOGRAM FOR BREAST CANCER: ICD-10-CM

## 2018-09-21 LAB
DIRECT EXAM: NORMAL
Lab: NORMAL
SPECIMEN DESCRIPTION: NORMAL
STATUS: NORMAL

## 2018-09-21 PROCEDURE — 87660 TRICHOMONAS VAGIN DIR PROBE: CPT

## 2018-09-21 PROCEDURE — 87480 CANDIDA DNA DIR PROBE: CPT

## 2018-09-21 PROCEDURE — G0145 SCR C/V CYTO,THINLAYER,RESCR: HCPCS

## 2018-09-21 PROCEDURE — 99396 PREV VISIT EST AGE 40-64: CPT | Performed by: NURSE PRACTITIONER

## 2018-09-21 PROCEDURE — 87591 N.GONORRHOEAE DNA AMP PROB: CPT

## 2018-09-21 PROCEDURE — 87491 CHLMYD TRACH DNA AMP PROBE: CPT

## 2018-09-21 PROCEDURE — 87510 GARDNER VAG DNA DIR PROBE: CPT

## 2018-09-21 PROCEDURE — 87624 HPV HI-RISK TYP POOLED RSLT: CPT

## 2018-09-21 ASSESSMENT — PATIENT HEALTH QUESTIONNAIRE - PHQ9
SUM OF ALL RESPONSES TO PHQ QUESTIONS 1-9: 0
2. FEELING DOWN, DEPRESSED OR HOPELESS: 0
SUM OF ALL RESPONSES TO PHQ9 QUESTIONS 1 & 2: 0
1. LITTLE INTEREST OR PLEASURE IN DOING THINGS: 0
SUM OF ALL RESPONSES TO PHQ QUESTIONS 1-9: 0

## 2018-09-21 NOTE — PROGRESS NOTES
History and Physical  [address], [address] [facility] [address] [place] [phone]   Fax [phone]  Magalie Estrada  2018              39 y.o. Chief Complaint   Patient presents with    Annual Exam       No LMP recorded. Patient has had an ablation. Primary Care Physician: Rolan Figueroa MD    The patient was seen and examined. She is here for her annual exam.  Patient desires STD testing. Complaining of occasional vaginal odor. Hx endometrial ablation  has occasional bleeding couple times/ yearHer bowels are regular. There are no voiding complaints. She denies any bloating. She denies vaginal discharge and was counseled on STD's and the need for barrier contraception. HPI : Magalie Estrada is a 39 y.o. female [id number]    Annual exam  Vaginal odor/ STD testing- declines blood testing  ________________________________________________________________________  Obstetric History       T4      L4     SAB0   TAB1   Ectopic0   Molar0   Multiple0   Live Births4       # Outcome Date GA Lbr Robert/2nd Weight Sex Delivery Anes PTL Lv   5 TAB        N    4 Term      Vag-Spont  N MONTY   3 Term      Vag-Spont  N MONTY   2 Term      Vag-Spont  N MONTY   1 Term      Vag-Spont  N MONTY        Past Medical History:   Diagnosis Date    Asthma     Dyslipidemia (high LDL; low HDL) 2017    History of endometrial ablation     History of kidney stones 2017    History of tubal ligation     Hyperglycemia 2017    Hypertriglyceridemia 2017    Obesity (BMI 30-39. 9) 2017    Vision abnormalities     glasses/contacts                                                                   Past Surgical History:   Procedure Laterality Date    COLONOSCOPY      ENDOMETRIAL ABLATION      ENDOMETRIAL ABLATION      LITHOTRIPSY      TUBAL LIGATION      TUBAL LIGATION      WISDOM TOOTH EXTRACTION       Family History   Problem Relation Age Left Upper Arm   Position: Sitting   Cuff Size: Medium Adult   Pulse: 80   Weight: 195 lb (88.5 kg)   Height: 5' 1\" (1.549 m)         General Appearance: This  is a well Developed, well Nourished, well groomed female. Her BMI was reviewed. Nutritional decision making was discussed. Skin:  There was a Normal Inspection of the skin without rashes or lesions. There were no rashes. (Papular, Maculopapular, Hives, Pustular, Macular)     There were no lesions (Ulcers, Erythema, Abn. Appearing Nevi)            Lymphatic:  No Lymph Nodes were Palpable in the neck , axilla or groin.  0 # Of Lymph Nodes; Location ; Character [Normal]  [Shotty] [Tender] [Enlarged]     Neck and EENT:  The neck was supple. There were no masses   The thyroid was not enlarged and had no masses. Perrla, EOMI B/L, TMI B/L No Abnormalities. Throat inspected-No exudates or Masses, Nares Patent No Masses        Respiratory: The lungs were auscultated and found to be clear. There were no rales, rhonchi or wheezes. There was a good respiratory effort. Cardiovascular: The heart was in a regular rate and rhythm. . No S3 or S4. There was no murmur appreciated. Location, grade, and radiation are not applicable. Extremities: The patients extremities were without calf tenderness, edema, or varicosities. There was full range of motion in all four extremities. Pulses in all four extremities were appreciated and are 2/4. Abdomen: The abdomen was soft and non-tender. There were good bowel sounds in all quadrants and there was no guarding, rebound or rigidity. On evaluation there was no evidence of hepatosplenomegaly and there was no costal vertebral valerie tenderness bilaterally. No hernias were appreciated. Abdominal Scars: abdominoplasty scar    Psych:   The patient had a normal Orientation to: Time, Place, Person, and Situation  There is no Mood / Affect changes    Breast:  (Chest)  normal appearance, no masses or

## 2018-09-24 LAB
C TRACH DNA GENITAL QL NAA+PROBE: NEGATIVE
N. GONORRHOEAE DNA: NEGATIVE

## 2018-09-26 LAB
HPV SAMPLE: NORMAL
HPV SOURCE: NORMAL
HPV, GENOTYPE 16: NOT DETECTED
HPV, GENOTYPE 18: NOT DETECTED
HPV, HIGH RISK OTHER: NOT DETECTED
HPV, INTERPRETATION: NORMAL

## 2018-10-03 ENCOUNTER — OFFICE VISIT (OUTPATIENT)
Dept: FAMILY MEDICINE CLINIC | Age: 43
End: 2018-10-03
Payer: COMMERCIAL

## 2018-10-03 VITALS
OXYGEN SATURATION: 98 % | DIASTOLIC BLOOD PRESSURE: 88 MMHG | HEIGHT: 61 IN | WEIGHT: 198.4 LBS | HEART RATE: 77 BPM | BODY MASS INDEX: 37.46 KG/M2 | SYSTOLIC BLOOD PRESSURE: 123 MMHG

## 2018-10-03 DIAGNOSIS — E78.5 DYSLIPIDEMIA (HIGH LDL; LOW HDL): ICD-10-CM

## 2018-10-03 DIAGNOSIS — R73.9 HYPERGLYCEMIA: ICD-10-CM

## 2018-10-03 DIAGNOSIS — R73.03 PREDIABETES: ICD-10-CM

## 2018-10-03 DIAGNOSIS — E78.1 HYPERTRIGLYCERIDEMIA: ICD-10-CM

## 2018-10-03 DIAGNOSIS — R06.83 SNORING: ICD-10-CM

## 2018-10-03 DIAGNOSIS — E66.9 OBESITY (BMI 30-39.9): Primary | ICD-10-CM

## 2018-10-03 LAB — HBA1C MFR BLD: 6.1 %

## 2018-10-03 PROCEDURE — 99214 OFFICE O/P EST MOD 30 MIN: CPT | Performed by: FAMILY MEDICINE

## 2018-10-03 PROCEDURE — 83036 HEMOGLOBIN GLYCOSYLATED A1C: CPT | Performed by: FAMILY MEDICINE

## 2018-10-03 ASSESSMENT — ENCOUNTER SYMPTOMS
CONSTIPATION: 0
VOMITING: 0
WHEEZING: 0
NAUSEA: 0
ABDOMINAL PAIN: 0
CHEST TIGHTNESS: 0
ABDOMINAL DISTENTION: 0
COUGH: 0
DIARRHEA: 0
SHORTNESS OF BREATH: 0

## 2018-10-03 NOTE — PROGRESS NOTES
Chief Complaint   Patient presents with    Hyperglycemia    Weight Management         Celia Thomas  here today for follow up on chronic medical problems, go over labs and/or diagnostic studies, and medication refills. Hyperglycemia and Weight Management      HPI    Patient wants to lose weight, but unable to do so  She says she unintentionally  gained weight despite being started on Victoza. She says she has been tolerating the Victoza well except for some sore throat   Taking Victoza except for 1 week as she ran out. She admits that she didn't change the diet and eating free snacks which are mostly sweets, at work. Says she couldn't change her diet too much due to her working schedule too as she has 2 jobs . She admits that she likes sweets. She is planning to start martial arts  at her friend's place to get her into the motivation. She is also interested in weight loss program and special shakes that would help her lose weight. Says she mostly drinks water, but feels that she needs to drink more water  Says she might drink 2 cans of pop /month. She drinks 1-2 coffees/day, but bought from the store, with sugar and cream in it. There is unintentional weight gain of 4 pounds in 4 months    Wt Readings from Last 3 Encounters:   10/03/18 198 lb 6.4 oz (90 kg)   09/21/18 195 lb (88.5 kg)   06/28/18 194 lb (88 kg)     Patient admits of snoring loudly at nighttime, per her ex boyfriend and her children. She is not sure if she stops breathing at nighttime or not. He does awake up tired in the morning. She denies morning headaches. Exercise Tracking - Detailed 10/3/2018   Pedometer Steps/Day 5000         BP controlled. BP Readings from Last 3 Encounters:   10/03/18 123/88   09/21/18 116/78   06/28/18 124/88     There has worsening of prediabetes. Her father has diabetes.   She couldn't tolerate metformin in the past      Lab Results   Component Value Date    LABA1C 6.1 10/03/2018 Lab Results   Component Value Date     02/05/2018       Lab Results   Component Value Date    LABA1C 6.1 10/03/2018    LABA1C 6.0 06/28/2018    LABA1C 6.2 (H) 02/05/2018              [x]Negative depression screening. PHQ Scores 9/21/2018 3/14/2018 5/22/2017 5/22/2017 5/20/2016 5/20/2016   PHQ2 Score 0 0 0 0 0 0   PHQ9 Score 0 0 0 0 0 0      []1-4 = Minimal depression   []5-9 = Mild depression   []10-14 = Moderate depression   []15-19 = Moderately severe depression   []20-27 = Severe depression      Allergies   Allergen Reactions    Adipex-P [Phentermine Hcl]      palpitations     Metformin And Related      N, V, diarrhea abdominal cramps         Current Outpatient Prescriptions   Medication Sig Dispense Refill    Insulin Pen Needle 31G X 5 MM MISC 1 each by Does not apply route daily TO BE USED with VICTOZA, DAILY 100 each 3    Liraglutide (VICTOZA) 18 MG/3ML SOPN SC injection Initial: 0.6 mg once daily SC for 1 week; then increase to 1.2 mg once daily. Call for refill 3 pen 0     No current facility-administered medications for this visit. Social History     Social History    Marital status: Single     Spouse name: N/A    Number of children: N/A    Years of education: N/A     Occupational History    Not on file.      Social History Main Topics    Smoking status: Never Smoker    Smokeless tobacco: Never Used    Alcohol use 0.0 oz/week      Comment: Socially    Drug use: No    Sexual activity: Yes     Partners: Male     Birth control/ protection: Surgical      Comment: TL     Other Topics Concern    Not on file     Social History Narrative    No narrative on file     Counseling given: Yes        Family History   Problem Relation Age of Onset    Diabetes Father     Bipolar Disorder Sister     Lung Cancer Paternal Grandmother     Heart Attack Paternal Grandfather              -rest of complaints with corresponding details per ROS    The patient's past medical, surgical, Component Value Date    ALT 15 02/05/2018    AST 12 02/05/2018    ALKPHOS 69 02/05/2018    BILITOT 0.37 02/05/2018       Lab Results   Component Value Date    TSH 1.23 02/05/2018       Lab Results   Component Value Date    CHOL 226 (H) 05/22/2017    CHOL 204 (H) 04/06/2013     Lab Results   Component Value Date    TRIG 246 (H) 05/22/2017    TRIG 185 (H) 04/06/2013     Lab Results   Component Value Date    HDL 49 05/22/2017    HDL 46 04/06/2013     Lab Results   Component Value Date    LDLCHOLESTEROL 128 05/22/2017    LDLCHOLESTEROL 121 (H) 04/06/2013       Lab Results   Component Value Date    CHOLHDLRATIO 4.6 05/22/2017    CHOLHDLRATIO 4.4 04/06/2013       Lab Results   Component Value Date    LABA1C 6.1 10/03/2018       No results found for: [id number]    No results found for: FOLATE    No results found for: IRON, TIBC, FERRITIN    Lab Results   Component Value Date    VITD25 34.0 04/06/2013           ASSESSMENT AND PLAN      1. Obesity (BMI 30-39. 9)  worsening  - Dose increased  Liraglutide (VICTOZA) 18 MG/3ML SOPN SC injection; Inject 1.8 mg into the skin daily Dose increased 10/3/2018  Dispense: 6 pen; Refill: 3  - Corry Alejandra MD, Weight Management and [facility]      Patient was asked about her current diet and exercise habits, and personalized advice was provided regarding recommended lifestyle changes. Patient's comorbid health conditions associated with elevated BMI were discussed, including dyslipidemia, hyperglycemia and likely GEORGE, as well as the likely benefits of weight loss. Based upon patient's motivation to change her behavior, the following plan was agreed upon to work toward a weight loss goal of 1 pounds/weeks: low carbohydrate diet, comprehensive weight management program referral- to [facility] weight loss, wear a pedometer and get at least 10,000 steps per day and exercise for at least 30 minutes 4-5 days per week. Educational materials for  weight loss were provided. Patient will follow-up in 3 month(s) with PCP. Provider spent 14 minutes counseling patient. 2. Prediabetes  worsening  -Dose increased   Liraglutide (VICTOZA) 18 MG/3ML SOPN SC injection; Inject 1.8 mg into the skin daily Dose increased 10/3/2018  Dispense: 6 pen; Refill: 3  - Cristiano Hastings MD, Weight Management and [address] [place] [address]  Stop drinking coffee from the store  Stop eating free snacks at work  Increase activity level and start [employer]    3. Hypertriglyceridemia  Low carb, low fat diet, increase fruits and vegetables, and exercise 4-5 times a week 30-40 minutes a day, or walk 1-2 hours per day, AND/or wear a pedometer and get at least 10,000 steps per day. 4. Dyslipidemia (high LDL; low HDL)  Will recheck FLP in 3 mo    5. Snoring  Likely GEORGE  Attempt to lose weight     6. Hyperglycemia  - POCT glycosylated hemoglobin (Hb A1C) 6.1, worsening prediabetes   Lab Results   Component Value Date    LABA1C 6.1 10/03/2018     Lab Results   Component Value Date     02/05/2018       - Dose increased : Liraglutide (VICTOZA) 18 MG/3ML SOPN SC injection; Inject 1.8 mg into the skin daily Dose increased 10/3/2018  Dispense: 6 pen; Refill: 3    We'll do labs next time if [facility] weight [facility] doesn't order it.       Orders Placed This Encounter   Procedures   Cristiano Hastings MD, Weight Management and [address] [place] [address]     Referral Priority:   Routine     Referral Type:   Eval and Treat     Referral Reason:   Specialty Services Required     Referred to Provider:   Galdino Ye MD     Requested Specialty:   Bariatric Surgery     Number of Visits Requested:   1    POCT glycosylated hemoglobin (Hb A1C)         Medications Discontinued During This Encounter   Medication Reason    Liraglutide (Mattie Gehrig) 18 MG/3ML SOPN SC injection REORDER       Crystal received counseling on the following healthy behaviors: nutrition, exercise, medication adherence and weight loss  Reviewed Provider Diamond Gonzalez   1/11/2019 11:30 AM Omkar Uribe MD [facility]     This note was completed by using the assistance of a speech-recognition program. However, inadvertent computerized transcription errors may be present. Although every effort was made to ensure accuracy, no guarantees can be provided that every mistake has been identified and corrected by editing.   Electronically signed by Omkar Uribe MD on 10/3/2018  10:57 PM

## 2018-10-03 NOTE — PATIENT INSTRUCTIONS
juice.  · Get 2 to 3 servings of low-fat and fat-free dairy each day. A serving is 8 ounces of milk, 1 cup of yogurt, or 1 ½ ounces of cheese. · Eat 6 to 8 servings of grains each day. A serving is 1 slice of bread, 1 ounce of dry cereal, or ½ cup of cooked rice, pasta, or cooked cereal. Try to choose whole-grain products as much as possible. · Limit lean meat, poultry, and fish to 2 servings each day. A serving is 3 ounces, about the size of a deck of cards. · Eat 4 to 5 servings of nuts, seeds, and legumes (cooked dried beans, lentils, and split peas) each week. A serving is 1/3 cup of nuts, 2 tablespoons of seeds, or ½ cup of cooked beans or peas. · Limit fats and oils to 2 to 3 servings each day. A serving is 1 teaspoon of vegetable oil or 2 tablespoons of salad dressing. · Limit sweets and added sugars to 5 servings or less a week. A serving is 1 tablespoon jelly or jam, ½ cup sorbet, or 1 cup of lemonade. · Eat less than 2,300 milligrams (mg) of sodium a day. If you limit your sodium to 1,500 mg a day, you can lower your blood pressure even more. Tips for success  · Start small. Do not try to make dramatic changes to your diet all at once. You might feel that you are missing out on your favorite foods and then be more likely to not follow the plan. Make small changes, and stick with them. Once those changes become habit, add a few more changes. · Try some of the following:  ¨ Make it a goal to eat a fruit or vegetable at every meal and at snacks. This will make it easy to get the recommended amount of fruits and vegetables each day. ¨ Try yogurt topped with fruit and nuts for a snack or healthy dessert. ¨ Add lettuce, tomato, cucumber, and onion to sandwiches. ¨ Combine a ready-made pizza crust with low-fat mozzarella cheese and lots of vegetable toppings. Try using tomatoes, squash, spinach, broccoli, carrots, cauliflower, and onions.   ¨ Have a variety of cut-up vegetables with a low-fat dip as an

## 2018-10-03 NOTE — PROGRESS NOTES
Visit Information    Have you changed or started any medications since your last visit including any over-the-counter medicines, vitamins, or herbal medicines? no   Are you having any side effects from any of your medications? -  no  Have you stopped taking any of your medications? Is so, why? -  no    Have you seen any other physician or provider since your last visit? Yes - Records Obtained  Have you had any other diagnostic tests since your last visit? Yes - Records Obtained  Have you been seen in the emergency room and/or had an admission to a hospital since we last saw you? No  Have you had your routine dental cleaning in the past 6 months? no    Have you activated your [employer] account? If not, what are your barriers?  Yes     Patient Care Team:  Estella Farrar MD as PCP - General (Family Medicine)  Estella Farrar MD as PCP - S Attributed Provider  MATT June - CNP as Nurse Practitioner (Certified Nurse Practitioner)  Mike Hamilton MD as Consulting Physician (Urology)    Medical History Review  Past Medical, Family, and Social History reviewed and does contribute to the patient presenting condition    Health Maintenance   Topic Date Due    Flu vaccine (1) 09/01/2018    DTaP/Tdap/Td vaccine (1 - Tdap) 07/03/2022 (Originally 2/15/2017)    A1C test (Diabetic or Prediabetic)  06/28/2019    Lipid screen  05/22/2022    Cervical cancer screen  09/21/2023    HIV screen  Completed

## 2018-10-05 LAB — CYTOLOGY REPORT: NORMAL

## 2018-10-08 ENCOUNTER — PATIENT MESSAGE (OUTPATIENT)
Dept: FAMILY MEDICINE CLINIC | Age: 43
End: 2018-10-08

## 2018-10-09 ENCOUNTER — PATIENT MESSAGE (OUTPATIENT)
Dept: FAMILY MEDICINE CLINIC | Age: 43
End: 2018-10-09

## 2018-10-09 NOTE — TELEPHONE ENCOUNTER
From: Denis Quiñonez  To: Francisco Rosenbaum MD  Sent: 10/9/2018 2:24 PM EDT  Subject: RE: Non-Urgent Medical Question    Yes    ----- Message -----  From: Francisco Rosenbaum MD  Sent: 10/8/18, 11:58 AM  To: Denis Quiñonez  Subject: RE: Non-Urgent Medical Question    Crystal,  Do you wear a wired bra? If you have any questions, please let me know. Francisco Rosenbaum MD  [address] [place] [id number]  Dept: [phone]  Dept Fax: [phone]      ----- Message -----   From: Denis Quiñonez   Sent: 10/8/2018 9:57 AM EDT   To: Francisco Rosenbaum MD  Subject: Non-Urgent Zohra Rao  I don't know why I forgot to mention that for the last 2weeks I have have this rib pain on both sides of my ribs (below the breast line). It is like a dull pain that is always there and when I take a deep breath it intensifies. Please let me know your thoughts or any other questions you have for me.

## 2018-10-19 ENCOUNTER — OFFICE VISIT (OUTPATIENT)
Dept: BARIATRICS/WEIGHT MGMT | Age: 43
End: 2018-10-19
Payer: COMMERCIAL

## 2018-10-19 VITALS
HEIGHT: 60 IN | WEIGHT: 194.4 LBS | DIASTOLIC BLOOD PRESSURE: 72 MMHG | RESPIRATION RATE: 20 BRPM | BODY MASS INDEX: 38.17 KG/M2 | SYSTOLIC BLOOD PRESSURE: 110 MMHG | HEART RATE: 74 BPM

## 2018-10-19 DIAGNOSIS — R73.03 PREDIABETES: ICD-10-CM

## 2018-10-19 DIAGNOSIS — E66.9 OBESITY (BMI 30-39.9): ICD-10-CM

## 2018-10-19 DIAGNOSIS — R73.03 PREDIABETES: Primary | ICD-10-CM

## 2018-10-19 DIAGNOSIS — J45.990 ASTHMA, EXERCISE INDUCED: ICD-10-CM

## 2018-10-19 DIAGNOSIS — R73.9 HYPERGLYCEMIA: ICD-10-CM

## 2018-10-19 DIAGNOSIS — E78.1 HYPERTRIGLYCERIDEMIA: ICD-10-CM

## 2018-10-19 DIAGNOSIS — Z87.442 HISTORY OF KIDNEY STONES: ICD-10-CM

## 2018-10-19 PROCEDURE — 99204 OFFICE O/P NEW MOD 45 MIN: CPT | Performed by: NURSE PRACTITIONER

## 2018-10-19 RX ORDER — GLUCOSAMINE HCL/CHONDROITIN SU 500-400 MG
CAPSULE ORAL
Refills: 0 | Status: CANCELLED | OUTPATIENT
Start: 2018-10-19

## 2018-10-19 RX ORDER — BLOOD PRESSURE TEST KIT
KIT MISCELLANEOUS
Qty: 100 EACH | Refills: 3 | Status: SHIPPED | OUTPATIENT
Start: 2018-10-19 | End: 2019-02-08

## 2018-10-19 RX ORDER — BLOOD-GLUCOSE METER
1 EACH MISCELLANEOUS DAILY
Qty: 1 KIT | Refills: 0 | Status: CANCELLED | OUTPATIENT
Start: 2018-10-19

## 2018-10-19 RX ORDER — SYRING-NEEDL,DISP,INSUL,0.3 ML 30 GX5/16"
1 SYRINGE, EMPTY DISPOSABLE MISCELLANEOUS ONCE
Qty: 100 DEVICE | Refills: 0 | Status: CANCELLED | OUTPATIENT
Start: 2018-10-19 | End: 2018-10-19

## 2018-10-19 NOTE — TELEPHONE ENCOUNTER
[facility] I called the pt to see if she wanted to change to them. She stated sh only wants the [facility]. She also wants the pen needles and alcohol pads [facility] to [facility]. She doesn't have a meter at home, but would like you to send one over. I don't know how to pend rx's to different pharmacies    Victoza - [facility]    [place], Alcohol, DM meter, Strips, Lancets: [facility] both pharm in system    Please Approve or Refuse.   Send to Pharmacy per Pt's Request[facility]     Next Visit Date:  1/11/2019   Last Visit Date: 10/3/2018    Hemoglobin A1C (%)   Date Value   10/03/2018 6.1   06/28/2018 6.0   02/05/2018 6.2 (H)             ( goal A1C is < 7)   BP Readings from Last 3 Encounters:   10/03/18 123/88   09/21/18 116/78   06/28/18 124/88          (goal 120/80)  BUN   Date Value Ref Range Status   02/05/2018 11 6 - 20 mg/dL Final     CREATININE   Date Value Ref Range Status   02/05/2018 0.70 0.50 - 0.90 mg/dL Final     Potassium   Date Value Ref Range Status   02/05/2018 4.0 3.7 - 5.3 mmol/L Final

## 2018-11-03 ENCOUNTER — HOSPITAL ENCOUNTER (OUTPATIENT)
Age: 43
Discharge: HOME | End: 2018-11-03
Payer: COMMERCIAL

## 2018-11-03 DIAGNOSIS — R73.03 PREDIABETES: ICD-10-CM

## 2018-11-03 DIAGNOSIS — J45.990 ASTHMA, EXERCISE INDUCED: ICD-10-CM

## 2018-11-03 DIAGNOSIS — Z87.442 HISTORY OF KIDNEY STONES: ICD-10-CM

## 2018-11-03 DIAGNOSIS — E78.1 HYPERTRIGLYCERIDEMIA: ICD-10-CM

## 2018-11-03 DIAGNOSIS — E66.9 OBESITY (BMI 30-39.9): ICD-10-CM

## 2018-11-03 LAB
ABSOLUTE EOS #: 0.14 K/UL (ref 0–0.44)
ABSOLUTE IMMATURE GRANULOCYTE: <0.03 K/UL (ref 0–0.3)
ABSOLUTE LYMPH #: 1.85 K/UL (ref 1.1–3.7)
ABSOLUTE MONO #: 0.46 K/UL (ref 0.1–1.2)
ALBUMIN SERPL-MCNC: 4.3 G/DL (ref 3.5–5.2)
ALBUMIN/GLOBULIN RATIO: 1.3 (ref 1–2.5)
ALP BLD-CCNC: 62 U/L (ref 35–104)
ALT SERPL-CCNC: 15 U/L (ref 5–33)
ANION GAP SERPL CALCULATED.3IONS-SCNC: 13 MMOL/L (ref 9–17)
AST SERPL-CCNC: 12 U/L
BASOPHILS # BLD: 0 % (ref 0–2)
BASOPHILS ABSOLUTE: <0.03 K/UL (ref 0–0.2)
BILIRUB SERPL-MCNC: 0.52 MG/DL (ref 0.3–1.2)
BUN BLDV-MCNC: 9 MG/DL (ref 6–20)
BUN/CREAT BLD: ABNORMAL (ref 9–20)
CALCIUM SERPL-MCNC: 9.3 MG/DL (ref 8.6–10.4)
CHLORIDE BLD-SCNC: 100 MMOL/L (ref 98–107)
CHOLESTEROL/HDL RATIO: 4.6
CHOLESTEROL: 194 MG/DL
CO2: 25 MMOL/L (ref 20–31)
CREAT SERPL-MCNC: 0.72 MG/DL (ref 0.5–0.9)
DIFFERENTIAL TYPE: NORMAL
EKG ATRIAL RATE: 73 BPM
EKG P AXIS: 4 DEGREES
EKG P-R INTERVAL: 154 MS
EKG Q-T INTERVAL: 426 MS
EKG QRS DURATION: 80 MS
EKG QTC CALCULATION (BAZETT): 469 MS
EKG R AXIS: 24 DEGREES
EKG T AXIS: 13 DEGREES
EKG VENTRICULAR RATE: 73 BPM
EOSINOPHILS RELATIVE PERCENT: 3 % (ref 1–4)
GFR AFRICAN AMERICAN: >60 ML/MIN
GFR NON-AFRICAN AMERICAN: >60 ML/MIN
GFR SERPL CREATININE-BSD FRML MDRD: ABNORMAL ML/MIN/{1.73_M2}
GFR SERPL CREATININE-BSD FRML MDRD: ABNORMAL ML/MIN/{1.73_M2}
GLUCOSE BLD-MCNC: 106 MG/DL (ref 70–99)
HCT VFR BLD CALC: 39.1 % (ref 36.3–47.1)
HDLC SERPL-MCNC: 42 MG/DL
HEMOGLOBIN: 12.4 G/DL (ref 11.9–15.1)
IMMATURE GRANULOCYTES: 0 %
LDL CHOLESTEROL: 121 MG/DL (ref 0–130)
LYMPHOCYTES # BLD: 38 % (ref 24–43)
MCH RBC QN AUTO: 29.4 PG (ref 25.2–33.5)
MCHC RBC AUTO-ENTMCNC: 31.7 G/DL (ref 28.4–34.8)
MCV RBC AUTO: 92.7 FL (ref 82.6–102.9)
MONOCYTES # BLD: 9 % (ref 3–12)
NRBC AUTOMATED: 0 PER 100 WBC
PDW BLD-RTO: 13.5 % (ref 11.8–14.4)
PLATELET # BLD: 283 K/UL (ref 138–453)
PLATELET ESTIMATE: NORMAL
PMV BLD AUTO: 10.7 FL (ref 8.1–13.5)
POTASSIUM SERPL-SCNC: 4.2 MMOL/L (ref 3.7–5.3)
RBC # BLD: 4.22 M/UL (ref 3.95–5.11)
RBC # BLD: NORMAL 10*6/UL
SEG NEUTROPHILS: 50 % (ref 36–65)
SEGMENTED NEUTROPHILS ABSOLUTE COUNT: 2.39 K/UL (ref 1.5–8.1)
SODIUM BLD-SCNC: 138 MMOL/L (ref 135–144)
TOTAL PROTEIN: 7.5 G/DL (ref 6.4–8.3)
TRIGL SERPL-MCNC: 156 MG/DL
TSH SERPL DL<=0.05 MIU/L-ACNC: 0.68 MIU/L (ref 0.3–5)
URIC ACID: 7.6 MG/DL (ref 2.4–5.7)
VLDLC SERPL CALC-MCNC: ABNORMAL MG/DL (ref 1–30)
WBC # BLD: 4.9 K/UL (ref 3.5–11.3)
WBC # BLD: NORMAL 10*3/UL

## 2018-11-03 PROCEDURE — 80053 COMPREHEN METABOLIC PANEL: CPT

## 2018-11-03 PROCEDURE — 85025 COMPLETE CBC W/AUTO DIFF WBC: CPT

## 2018-11-03 PROCEDURE — 93005 ELECTROCARDIOGRAM TRACING: CPT

## 2018-11-03 PROCEDURE — 36415 COLL VENOUS BLD VENIPUNCTURE: CPT

## 2018-11-03 PROCEDURE — 84550 ASSAY OF BLOOD/URIC ACID: CPT

## 2018-11-03 PROCEDURE — 84443 ASSAY THYROID STIM HORMONE: CPT

## 2018-11-03 PROCEDURE — 80061 LIPID PANEL: CPT

## 2018-11-20 ENCOUNTER — OFFICE VISIT (OUTPATIENT)
Dept: BARIATRICS/WEIGHT MGMT | Age: 43
End: 2018-11-20
Payer: COMMERCIAL

## 2018-11-20 VITALS
DIASTOLIC BLOOD PRESSURE: 80 MMHG | WEIGHT: 194.5 LBS | HEART RATE: 74 BPM | HEIGHT: 60 IN | BODY MASS INDEX: 38.18 KG/M2 | SYSTOLIC BLOOD PRESSURE: 120 MMHG

## 2018-11-20 DIAGNOSIS — E66.9 OBESITY (BMI 30-39.9): ICD-10-CM

## 2018-11-20 DIAGNOSIS — J45.990 ASTHMA, EXERCISE INDUCED: Primary | ICD-10-CM

## 2018-11-20 DIAGNOSIS — R73.03 PREDIABETES: ICD-10-CM

## 2018-11-20 DIAGNOSIS — E78.1 HYPERTRIGLYCERIDEMIA: ICD-10-CM

## 2018-11-20 PROCEDURE — 99213 OFFICE O/P EST LOW 20 MIN: CPT | Performed by: NURSE PRACTITIONER

## 2018-11-21 NOTE — PROGRESS NOTES
Group Lifestyle Balance Follow-up Progress Note      Subjective     Patient is here for group medical appointment for Group Lifestyle Balance weight management program follow-up for the chronic conditions of Asthma, Hypertriglyceridemia, Prediabetes. Patient continues on the program and tolerating well. Total weight loss to date is 0 lbs. No current issues. Allergies: Allergies   Allergen Reactions    Adipex-P [Phentermine Hcl]      palpitations     Metformin And Related      N, V, diarrhea abdominal cramps        Past Medical History:     Past Medical History:   Diagnosis Date    Asthma     Dyslipidemia (high LDL; low HDL) 5/22/2017    History of endometrial ablation     History of kidney stones 5/22/2017    History of tubal ligation     Hyperglycemia 5/22/2017    Hypertriglyceridemia 5/22/2017    Obesity (BMI 30-39. 9) 5/22/2017    Vision abnormalities     glasses/contacts   . Past Surgical History:  Past Surgical History:   Procedure Laterality Date    COLONOSCOPY      ENDOMETRIAL ABLATION      ENDOMETRIAL ABLATION      LITHOTRIPSY      TUBAL LIGATION      TUBAL LIGATION      WISDOM TOOTH EXTRACTION         Family History:  Family History   Problem Relation Age of Onset    Diabetes Father     Bipolar Disorder Sister     Lung Cancer Paternal Grandmother     Heart Attack Paternal Grandfather        Social History:  Social History     Social History    Marital status: Single     Spouse name: N/A    Number of children: N/A    Years of education: N/A     Occupational History    Not on file.      Social History Main Topics    Smoking status: Never Smoker    Smokeless tobacco: Never Used    Alcohol use 0.0 oz/week      Comment: Socially    Drug use: No    Sexual activity: Yes     Partners: Male     Birth control/ protection: Surgical      Comment: TL     Other Topics Concern    Not on file     Social History Narrative    No narrative on file       Current

## 2018-11-27 ENCOUNTER — OFFICE VISIT (OUTPATIENT)
Dept: BARIATRICS/WEIGHT MGMT | Age: 43
End: 2018-11-27
Payer: COMMERCIAL

## 2018-11-27 VITALS
HEART RATE: 68 BPM | BODY MASS INDEX: 38.11 KG/M2 | HEIGHT: 60 IN | WEIGHT: 194.1 LBS | DIASTOLIC BLOOD PRESSURE: 74 MMHG | SYSTOLIC BLOOD PRESSURE: 116 MMHG

## 2018-11-27 DIAGNOSIS — E66.9 OBESITY (BMI 30-39.9): ICD-10-CM

## 2018-11-27 DIAGNOSIS — E78.1 HYPERTRIGLYCERIDEMIA: Primary | ICD-10-CM

## 2018-11-27 DIAGNOSIS — R06.83 SNORING: ICD-10-CM

## 2018-11-27 DIAGNOSIS — J45.990 ASTHMA, EXERCISE INDUCED: ICD-10-CM

## 2018-11-27 DIAGNOSIS — R73.03 PREDIABETES: ICD-10-CM

## 2018-11-27 PROCEDURE — 99213 OFFICE O/P EST LOW 20 MIN: CPT | Performed by: NURSE PRACTITIONER

## 2019-01-02 ENCOUNTER — PATIENT MESSAGE (OUTPATIENT)
Dept: FAMILY MEDICINE CLINIC | Age: 44
End: 2019-01-02

## 2019-01-02 DIAGNOSIS — J45.990 ASTHMA, EXERCISE INDUCED: Primary | ICD-10-CM

## 2019-01-02 PROBLEM — R06.09 DOE (DYSPNEA ON EXERTION): Status: ACTIVE | Noted: 2019-01-02

## 2019-01-02 RX ORDER — ALBUTEROL SULFATE 90 UG/1
2 AEROSOL, METERED RESPIRATORY (INHALATION) EVERY 6 HOURS PRN
Qty: 8 G | Refills: 3 | Status: SHIPPED | OUTPATIENT
Start: 2019-01-02 | End: 2020-04-20 | Stop reason: SDUPTHER

## 2019-01-03 ENCOUNTER — OFFICE VISIT (OUTPATIENT)
Dept: BARIATRICS/WEIGHT MGMT | Age: 44
End: 2019-01-03
Payer: COMMERCIAL

## 2019-01-03 VITALS
DIASTOLIC BLOOD PRESSURE: 82 MMHG | SYSTOLIC BLOOD PRESSURE: 128 MMHG | BODY MASS INDEX: 38.44 KG/M2 | HEIGHT: 60 IN | HEART RATE: 84 BPM | WEIGHT: 195.8 LBS

## 2019-01-03 DIAGNOSIS — J45.990 ASTHMA, EXERCISE INDUCED: ICD-10-CM

## 2019-01-03 DIAGNOSIS — Z87.442 HISTORY OF KIDNEY STONES: ICD-10-CM

## 2019-01-03 DIAGNOSIS — E78.1 HYPERTRIGLYCERIDEMIA: ICD-10-CM

## 2019-01-03 DIAGNOSIS — R73.03 PREDIABETES: Primary | ICD-10-CM

## 2019-01-03 DIAGNOSIS — E66.9 OBESITY (BMI 30-39.9): ICD-10-CM

## 2019-01-03 PROCEDURE — 99213 OFFICE O/P EST LOW 20 MIN: CPT | Performed by: NURSE PRACTITIONER

## 2019-01-10 ENCOUNTER — OFFICE VISIT (OUTPATIENT)
Dept: BARIATRICS/WEIGHT MGMT | Age: 44
End: 2019-01-10
Payer: COMMERCIAL

## 2019-01-10 VITALS
BODY MASS INDEX: 37.89 KG/M2 | SYSTOLIC BLOOD PRESSURE: 120 MMHG | HEART RATE: 72 BPM | WEIGHT: 193 LBS | HEIGHT: 60 IN | DIASTOLIC BLOOD PRESSURE: 80 MMHG

## 2019-01-10 DIAGNOSIS — E66.9 OBESITY (BMI 30-39.9): ICD-10-CM

## 2019-01-10 DIAGNOSIS — E78.5 DYSLIPIDEMIA (HIGH LDL; LOW HDL): ICD-10-CM

## 2019-01-10 DIAGNOSIS — R03.0 ELEVATED BLOOD PRESSURE READING: ICD-10-CM

## 2019-01-10 DIAGNOSIS — R06.83 SNORING: ICD-10-CM

## 2019-01-10 DIAGNOSIS — Z87.442 HISTORY OF KIDNEY STONES: ICD-10-CM

## 2019-01-10 DIAGNOSIS — J45.990 ASTHMA, EXERCISE INDUCED: Primary | ICD-10-CM

## 2019-01-10 DIAGNOSIS — R73.03 PREDIABETES: ICD-10-CM

## 2019-01-10 PROCEDURE — 99213 OFFICE O/P EST LOW 20 MIN: CPT | Performed by: NURSE PRACTITIONER

## 2019-01-11 ENCOUNTER — OFFICE VISIT (OUTPATIENT)
Dept: FAMILY MEDICINE CLINIC | Age: 44
End: 2019-01-11
Payer: COMMERCIAL

## 2019-01-11 VITALS
BODY MASS INDEX: 37.89 KG/M2 | SYSTOLIC BLOOD PRESSURE: 131 MMHG | HEIGHT: 60 IN | DIASTOLIC BLOOD PRESSURE: 87 MMHG | HEART RATE: 83 BPM | WEIGHT: 193 LBS | OXYGEN SATURATION: 100 %

## 2019-01-11 DIAGNOSIS — E66.9 OBESITY (BMI 30-39.9): Primary | ICD-10-CM

## 2019-01-11 DIAGNOSIS — R73.03 PREDIABETES: ICD-10-CM

## 2019-01-11 DIAGNOSIS — R06.83 SNORING: ICD-10-CM

## 2019-01-11 DIAGNOSIS — E78.5 DYSLIPIDEMIA (HIGH LDL; LOW HDL): ICD-10-CM

## 2019-01-11 DIAGNOSIS — J45.990 ASTHMA, EXERCISE INDUCED: ICD-10-CM

## 2019-01-11 DIAGNOSIS — R73.9 HYPERGLYCEMIA: ICD-10-CM

## 2019-01-11 LAB — HBA1C MFR BLD: 5.5 %

## 2019-01-11 PROCEDURE — 99214 OFFICE O/P EST MOD 30 MIN: CPT | Performed by: FAMILY MEDICINE

## 2019-01-11 PROCEDURE — 83036 HEMOGLOBIN GLYCOSYLATED A1C: CPT | Performed by: FAMILY MEDICINE

## 2019-01-11 RX ORDER — PHENTERMINE HYDROCHLORIDE 37.5 MG/1
37.5 TABLET ORAL
Qty: 30 TABLET | Refills: 0 | Status: SHIPPED | OUTPATIENT
Start: 2019-01-11 | End: 2019-02-08 | Stop reason: SDUPTHER

## 2019-01-11 ASSESSMENT — ASTHMA QUESTIONNAIRES
QUESTION_4 LAST FOUR WEEKS HOW OFTEN HAVE YOU USED YOUR RESCUE INHALER OR NEBULIZER MEDICATION (SUCH AS ALBUTEROL): 3
QUESTION_5 LAST FOUR WEEKS HOW WOULD YOU RATE YOUR ASTHMA CONTROL: 4
QUESTION_2 LAST FOUR WEEKS HOW OFTEN HAVE YOU HAD SHORTNESS OF BREATH: 3
ACT_TOTALSCORE: 20
QUESTION_3 LAST FOUR WEEKS HOW OFTEN DID YOUR ASTHMA SYMPTOMS (WHEEZING, COUGHING, SHORTNESS OF BREATH, CHEST TIGHTNESS OR PAIN) WAKE YOU UP AT NIGHT OR EARLIER THAN USUAL IN THE MORNING: 5
QUESTION_1 LAST FOUR WEEKS HOW MUCH OF THE TIME DID YOUR ASTHMA KEEP YOU FROM GETTING AS MUCH DONE AT WORK, SCHOOL OR AT HOME: 5

## 2019-01-11 ASSESSMENT — ENCOUNTER SYMPTOMS
VOMITING: 0
ABDOMINAL PAIN: 0
ABDOMINAL DISTENTION: 0
CHEST TIGHTNESS: 0
CONSTIPATION: 0
SHORTNESS OF BREATH: 1
WHEEZING: 0
NAUSEA: 0
COUGH: 0
DIARRHEA: 0

## 2019-01-13 PROBLEM — R06.09 DOE (DYSPNEA ON EXERTION): Status: RESOLVED | Noted: 2019-01-02 | Resolved: 2019-01-13

## 2019-01-24 ENCOUNTER — OFFICE VISIT (OUTPATIENT)
Dept: BARIATRICS/WEIGHT MGMT | Age: 44
End: 2019-01-24
Payer: COMMERCIAL

## 2019-01-24 VITALS
WEIGHT: 188.8 LBS | BODY MASS INDEX: 37.07 KG/M2 | HEART RATE: 76 BPM | DIASTOLIC BLOOD PRESSURE: 84 MMHG | SYSTOLIC BLOOD PRESSURE: 122 MMHG | HEIGHT: 60 IN

## 2019-01-24 DIAGNOSIS — E78.1 HYPERTRIGLYCERIDEMIA: ICD-10-CM

## 2019-01-24 DIAGNOSIS — R06.83 SNORING: ICD-10-CM

## 2019-01-24 DIAGNOSIS — Z87.442 HISTORY OF KIDNEY STONES: ICD-10-CM

## 2019-01-24 DIAGNOSIS — J45.990 ASTHMA, EXERCISE INDUCED: ICD-10-CM

## 2019-01-24 DIAGNOSIS — R73.03 PREDIABETES: Primary | ICD-10-CM

## 2019-01-24 DIAGNOSIS — E66.9 OBESITY (BMI 30-39.9): ICD-10-CM

## 2019-01-24 PROCEDURE — 99213 OFFICE O/P EST LOW 20 MIN: CPT | Performed by: NURSE PRACTITIONER

## 2019-02-08 ENCOUNTER — OFFICE VISIT (OUTPATIENT)
Dept: FAMILY MEDICINE CLINIC | Age: 44
End: 2019-02-08
Payer: COMMERCIAL

## 2019-02-08 VITALS
SYSTOLIC BLOOD PRESSURE: 124 MMHG | BODY MASS INDEX: 36.71 KG/M2 | DIASTOLIC BLOOD PRESSURE: 89 MMHG | HEIGHT: 60 IN | HEART RATE: 93 BPM | WEIGHT: 187 LBS | OXYGEN SATURATION: 98 %

## 2019-02-08 DIAGNOSIS — E78.5 DYSLIPIDEMIA (HIGH LDL; LOW HDL): ICD-10-CM

## 2019-02-08 DIAGNOSIS — E66.9 OBESITY (BMI 30-39.9): Primary | ICD-10-CM

## 2019-02-08 DIAGNOSIS — R73.03 PREDIABETES: ICD-10-CM

## 2019-02-08 PROBLEM — R63.5 UNINTENDED WEIGHT GAIN: Status: RESOLVED | Noted: 2017-05-22 | Resolved: 2019-02-08

## 2019-02-08 PROCEDURE — 99213 OFFICE O/P EST LOW 20 MIN: CPT | Performed by: FAMILY MEDICINE

## 2019-02-08 RX ORDER — PHENTERMINE HYDROCHLORIDE 37.5 MG/1
37.5 TABLET ORAL
Qty: 30 TABLET | Refills: 0 | Status: SHIPPED | OUTPATIENT
Start: 2019-02-08 | End: 2019-03-08 | Stop reason: SDUPTHER

## 2019-02-08 ASSESSMENT — PATIENT HEALTH QUESTIONNAIRE - PHQ9
SUM OF ALL RESPONSES TO PHQ QUESTIONS 1-9: 0
2. FEELING DOWN, DEPRESSED OR HOPELESS: 0
1. LITTLE INTEREST OR PLEASURE IN DOING THINGS: 0
SUM OF ALL RESPONSES TO PHQ9 QUESTIONS 1 & 2: 0
SUM OF ALL RESPONSES TO PHQ QUESTIONS 1-9: 0

## 2019-02-08 ASSESSMENT — ENCOUNTER SYMPTOMS
ABDOMINAL PAIN: 0
NAUSEA: 0
COUGH: 0
SHORTNESS OF BREATH: 0
WHEEZING: 0
CHEST TIGHTNESS: 0
ABDOMINAL DISTENTION: 0

## 2019-02-18 ENCOUNTER — PATIENT MESSAGE (OUTPATIENT)
Dept: BARIATRICS/WEIGHT MGMT | Age: 44
End: 2019-02-18

## 2019-03-08 ENCOUNTER — OFFICE VISIT (OUTPATIENT)
Dept: FAMILY MEDICINE CLINIC | Age: 44
End: 2019-03-08
Payer: COMMERCIAL

## 2019-03-08 VITALS
BODY MASS INDEX: 36.36 KG/M2 | OXYGEN SATURATION: 98 % | SYSTOLIC BLOOD PRESSURE: 127 MMHG | HEIGHT: 60 IN | DIASTOLIC BLOOD PRESSURE: 82 MMHG | WEIGHT: 185.2 LBS | HEART RATE: 94 BPM

## 2019-03-08 DIAGNOSIS — E78.1 HYPERTRIGLYCERIDEMIA: ICD-10-CM

## 2019-03-08 DIAGNOSIS — Z87.442 HISTORY OF KIDNEY STONES: ICD-10-CM

## 2019-03-08 DIAGNOSIS — J45.990 ASTHMA, EXERCISE INDUCED: ICD-10-CM

## 2019-03-08 DIAGNOSIS — E78.5 DYSLIPIDEMIA (HIGH LDL; LOW HDL): ICD-10-CM

## 2019-03-08 DIAGNOSIS — Z12.11 SCREENING FOR COLON CANCER: ICD-10-CM

## 2019-03-08 DIAGNOSIS — Z86.010 HISTORY OF COLON POLYPS: ICD-10-CM

## 2019-03-08 DIAGNOSIS — Z23 NEED FOR PROPHYLACTIC VACCINATION AGAINST STREPTOCOCCUS PNEUMONIAE (PNEUMOCOCCUS): ICD-10-CM

## 2019-03-08 DIAGNOSIS — K59.01 SLOW TRANSIT CONSTIPATION: ICD-10-CM

## 2019-03-08 DIAGNOSIS — Z12.31 ENCOUNTER FOR SCREENING MAMMOGRAM FOR BREAST CANCER: ICD-10-CM

## 2019-03-08 DIAGNOSIS — E66.9 OBESITY (BMI 30-39.9): Primary | ICD-10-CM

## 2019-03-08 PROCEDURE — 90732 PPSV23 VACC 2 YRS+ SUBQ/IM: CPT | Performed by: FAMILY MEDICINE

## 2019-03-08 PROCEDURE — 99214 OFFICE O/P EST MOD 30 MIN: CPT | Performed by: FAMILY MEDICINE

## 2019-03-08 PROCEDURE — 90471 IMMUNIZATION ADMIN: CPT | Performed by: FAMILY MEDICINE

## 2019-03-08 RX ORDER — PHENTERMINE HYDROCHLORIDE 37.5 MG/1
37.5 TABLET ORAL
Qty: 30 TABLET | Refills: 0 | Status: SHIPPED | OUTPATIENT
Start: 2019-03-08 | End: 2019-04-07

## 2019-03-08 ASSESSMENT — ASTHMA QUESTIONNAIRES
QUESTION_2 LAST FOUR WEEKS HOW OFTEN HAVE YOU HAD SHORTNESS OF BREATH: 5
ACT_TOTALSCORE: 25
QUESTION_3 LAST FOUR WEEKS HOW OFTEN DID YOUR ASTHMA SYMPTOMS (WHEEZING, COUGHING, SHORTNESS OF BREATH, CHEST TIGHTNESS OR PAIN) WAKE YOU UP AT NIGHT OR EARLIER THAN USUAL IN THE MORNING: 5
QUESTION_1 LAST FOUR WEEKS HOW MUCH OF THE TIME DID YOUR ASTHMA KEEP YOU FROM GETTING AS MUCH DONE AT WORK, SCHOOL OR AT HOME: 5
QUESTION_4 LAST FOUR WEEKS HOW OFTEN HAVE YOU USED YOUR RESCUE INHALER OR NEBULIZER MEDICATION (SUCH AS ALBUTEROL): 5
QUESTION_5 LAST FOUR WEEKS HOW WOULD YOU RATE YOUR ASTHMA CONTROL: 5

## 2019-03-08 ASSESSMENT — ENCOUNTER SYMPTOMS
WHEEZING: 0
ABDOMINAL DISTENTION: 0
VOMITING: 0
NAUSEA: 0
ABDOMINAL PAIN: 0
CONSTIPATION: 1
CHEST TIGHTNESS: 0
DIARRHEA: 0
COUGH: 0

## 2019-03-10 PROBLEM — Z86.010 HISTORY OF COLON POLYPS: Status: ACTIVE | Noted: 2019-03-10

## 2019-03-10 PROBLEM — K59.01 SLOW TRANSIT CONSTIPATION: Status: ACTIVE | Noted: 2019-03-10

## 2019-03-10 PROBLEM — Z86.0100 HISTORY OF COLON POLYPS: Status: ACTIVE | Noted: 2019-03-10

## 2019-03-10 ASSESSMENT — ENCOUNTER SYMPTOMS: SHORTNESS OF BREATH: 1

## 2019-05-10 ENCOUNTER — HOSPITAL ENCOUNTER (OUTPATIENT)
Dept: MAMMOGRAPHY | Age: 44
Discharge: HOME | End: 2019-05-12
Payer: COMMERCIAL

## 2019-05-10 DIAGNOSIS — Z12.31 ENCOUNTER FOR SCREENING MAMMOGRAM FOR BREAST CANCER: ICD-10-CM

## 2019-05-10 PROCEDURE — 77063 BREAST TOMOSYNTHESIS BI: CPT

## 2019-05-31 ENCOUNTER — PATIENT MESSAGE (OUTPATIENT)
Dept: FAMILY MEDICINE CLINIC | Age: 44
End: 2019-05-31

## 2019-05-31 DIAGNOSIS — N76.1 SUBACUTE VAGINITIS: Primary | ICD-10-CM

## 2019-06-03 RX ORDER — METRONIDAZOLE 500 MG/1
500 TABLET ORAL 2 TIMES DAILY
Qty: 14 TABLET | Refills: 0 | Status: SHIPPED | OUTPATIENT
Start: 2019-06-03 | End: 2019-06-10

## 2019-06-03 RX ORDER — FLUCONAZOLE 150 MG/1
150 TABLET ORAL ONCE
Qty: 1 TABLET | Refills: 0 | Status: SHIPPED | OUTPATIENT
Start: 2019-06-03 | End: 2019-06-03

## 2019-06-03 NOTE — TELEPHONE ENCOUNTER
From: Lon Stapleton  To: Josue Adorno MD  Sent: 5/31/2019 3:42 PM EDT  Subject: Prescription Question    Hello. I was wondering if you could call in something for a yeast infection. I have yellow/white discharge and fishy smell.  [facility] Thank you

## 2019-06-13 ENCOUNTER — TELEPHONE (OUTPATIENT)
Dept: FAMILY MEDICINE CLINIC | Age: 44
End: 2019-06-13

## 2019-06-13 ENCOUNTER — TELEPHONE (OUTPATIENT)
Dept: GASTROENTEROLOGY | Age: 44
End: 2019-06-13

## 2019-06-13 DIAGNOSIS — Z86.010 HISTORY OF COLON POLYPS: Primary | ICD-10-CM

## 2019-06-13 DIAGNOSIS — Z12.11 SCREENING FOR COLON CANCER: ICD-10-CM

## 2019-06-13 NOTE — TELEPHONE ENCOUNTER
Please let her know   Diagnosis Orders   1. History of colon polyps     2.  Screening for colon cancer  Tyra Grover MD, Gastroenterology, [place]

## 2019-06-13 NOTE — TELEPHONE ENCOUNTER
Patient would like to schedule with GI upstairs for her colonoscopy but needs a referral placed in Epic. Can you please do that.  Darcy Espana

## 2019-06-13 NOTE — TELEPHONE ENCOUNTER
Patient called wanting to schedule a screening colon. Advised patient there is no referral from her PCP to our office and we would need one prior to scheduling. Patient to call PCP.

## 2019-06-14 DIAGNOSIS — Z12.11 ENCOUNTER FOR SCREENING COLONOSCOPY: Primary | ICD-10-CM

## 2019-06-14 RX ORDER — SODIUM, POTASSIUM,MAG SULFATES 17.5-3.13G
1 SOLUTION, RECONSTITUTED, ORAL ORAL ONCE
Qty: 1 BOTTLE | Refills: 0 | Status: SHIPPED | OUTPATIENT
Start: 2019-06-14 | End: 2019-06-14

## 2019-06-14 NOTE — TELEPHONE ENCOUNTER
Scheduled Brian Munoz Friday 06/28/19 @ 12 pm scr colon (new) suprep Dr Patrick Michel, emailed bowel prep instructions

## 2019-06-21 ENCOUNTER — TELEPHONE (OUTPATIENT)
Dept: GASTROENTEROLOGY | Age: 44
End: 2019-06-21

## 2019-06-21 NOTE — TELEPHONE ENCOUNTER
Writer spoke to pt over phone & she confirms she will be here for colon proc sched fahad/ Chase at [facility] Fri 6/28/19 @ 12pm proc time, 10:30am arrival time. Pt confirms she will have a  and writer answered pt's questions regarding her prep inst. Pt verbalizes her understanding.

## 2019-06-27 ENCOUNTER — TELEPHONE (OUTPATIENT)
Dept: GASTROENTEROLOGY | Age: 44
End: 2019-06-27

## 2019-06-27 NOTE — TELEPHONE ENCOUNTER
Pt did not receive her bowel prep instructions and wanted writer to go over bowel prep instructions with her over the phone. Writer instructed pt she should have been doing clear liquids, no solid foods, no milk/milk products & nothing with red or purple dye. Pt states she has been on clear liquids. Pt was also instructed to take her Suprep bowel prep 1st dose at 5pm & 2nd dose at 10pm and also instructed to make sure she drinks two 16oz glasses of water over the next hour after finishing Suprep mixture. Pt instructed nothing to drink after midnight. Pt verbalizes her understanding.

## 2019-06-27 NOTE — TELEPHONE ENCOUNTER
Patient called office stating that she never received her prep instructions. Confirmed e-mail with the patient and sent them.

## 2019-06-28 ENCOUNTER — ANESTHESIA (OUTPATIENT)
Dept: OPERATING ROOM | Age: 44
End: 2019-06-28
Payer: COMMERCIAL

## 2019-06-28 ENCOUNTER — ANESTHESIA EVENT (OUTPATIENT)
Dept: OPERATING ROOM | Age: 44
End: 2019-06-28
Payer: COMMERCIAL

## 2019-06-28 ENCOUNTER — HOSPITAL ENCOUNTER (OUTPATIENT)
Age: 44
Setting detail: OUTPATIENT SURGERY
Discharge: HOME | End: 2019-06-28
Attending: INTERNAL MEDICINE | Admitting: INTERNAL MEDICINE
Payer: COMMERCIAL

## 2019-06-28 VITALS
SYSTOLIC BLOOD PRESSURE: 148 MMHG | OXYGEN SATURATION: 95 % | DIASTOLIC BLOOD PRESSURE: 74 MMHG | RESPIRATION RATE: 18 BRPM

## 2019-06-28 VITALS
OXYGEN SATURATION: 100 % | WEIGHT: 182.5 LBS | HEART RATE: 80 BPM | DIASTOLIC BLOOD PRESSURE: 72 MMHG | SYSTOLIC BLOOD PRESSURE: 118 MMHG | RESPIRATION RATE: 18 BRPM | TEMPERATURE: 97.2 F | HEIGHT: 61 IN | BODY MASS INDEX: 34.46 KG/M2

## 2019-06-28 LAB — HCG QUALITATIVE: NEGATIVE

## 2019-06-28 PROCEDURE — 2500000003 HC RX 250 WO HCPCS: Performed by: NURSE ANESTHETIST, CERTIFIED REGISTERED

## 2019-06-28 PROCEDURE — 7100000010 HC PHASE II RECOVERY - FIRST 15 MIN: Performed by: INTERNAL MEDICINE

## 2019-06-28 PROCEDURE — 6360000002 HC RX W HCPCS: Performed by: NURSE ANESTHETIST, CERTIFIED REGISTERED

## 2019-06-28 PROCEDURE — 3700000000 HC ANESTHESIA ATTENDED CARE: Performed by: INTERNAL MEDICINE

## 2019-06-28 PROCEDURE — 3609027000 HC COLONOSCOPY: Performed by: INTERNAL MEDICINE

## 2019-06-28 PROCEDURE — 2580000003 HC RX 258: Performed by: ANESTHESIOLOGY

## 2019-06-28 PROCEDURE — 7100000011 HC PHASE II RECOVERY - ADDTL 15 MIN: Performed by: INTERNAL MEDICINE

## 2019-06-28 PROCEDURE — 2709999900 HC NON-CHARGEABLE SUPPLY: Performed by: INTERNAL MEDICINE

## 2019-06-28 PROCEDURE — 3700000001 HC ADD 15 MINUTES (ANESTHESIA): Performed by: INTERNAL MEDICINE

## 2019-06-28 PROCEDURE — 45378 DIAGNOSTIC COLONOSCOPY: CPT | Performed by: INTERNAL MEDICINE

## 2019-06-28 PROCEDURE — 2580000003 HC RX 258: Performed by: NURSE ANESTHETIST, CERTIFIED REGISTERED

## 2019-06-28 PROCEDURE — 84703 CHORIONIC GONADOTROPIN ASSAY: CPT

## 2019-06-28 RX ORDER — LIDOCAINE HYDROCHLORIDE 20 MG/ML
INJECTION, SOLUTION INFILTRATION; PERINEURAL PRN
Status: DISCONTINUED | OUTPATIENT
Start: 2019-06-28 | End: 2019-06-28 | Stop reason: SDUPTHER

## 2019-06-28 RX ORDER — PROPOFOL 10 MG/ML
INJECTION, EMULSION INTRAVENOUS PRN
Status: DISCONTINUED | OUTPATIENT
Start: 2019-06-28 | End: 2019-06-28 | Stop reason: SDUPTHER

## 2019-06-28 RX ORDER — ONDANSETRON 2 MG/ML
4 INJECTION INTRAMUSCULAR; INTRAVENOUS
Status: DISCONTINUED | OUTPATIENT
Start: 2019-06-28 | End: 2019-06-28 | Stop reason: HOSPADM

## 2019-06-28 RX ORDER — SODIUM CHLORIDE, SODIUM LACTATE, POTASSIUM CHLORIDE, CALCIUM CHLORIDE 600; 310; 30; 20 MG/100ML; MG/100ML; MG/100ML; MG/100ML
INJECTION, SOLUTION INTRAVENOUS CONTINUOUS
Status: DISCONTINUED | OUTPATIENT
Start: 2019-06-28 | End: 2019-06-28 | Stop reason: HOSPADM

## 2019-06-28 RX ORDER — SODIUM CHLORIDE, SODIUM LACTATE, POTASSIUM CHLORIDE, CALCIUM CHLORIDE 600; 310; 30; 20 MG/100ML; MG/100ML; MG/100ML; MG/100ML
INJECTION, SOLUTION INTRAVENOUS CONTINUOUS PRN
Status: DISCONTINUED | OUTPATIENT
Start: 2019-06-28 | End: 2019-06-28 | Stop reason: SDUPTHER

## 2019-06-28 RX ADMIN — LIDOCAINE HYDROCHLORIDE 60 MG: 20 INJECTION, SOLUTION INFILTRATION; PERINEURAL at 12:17

## 2019-06-28 RX ADMIN — PROPOFOL 70 MG: 10 INJECTION, EMULSION INTRAVENOUS at 12:17

## 2019-06-28 RX ADMIN — PROPOFOL 50 MG: 10 INJECTION, EMULSION INTRAVENOUS at 12:26

## 2019-06-28 RX ADMIN — PROPOFOL 50 MG: 10 INJECTION, EMULSION INTRAVENOUS at 12:22

## 2019-06-28 RX ADMIN — PROPOFOL 50 MG: 10 INJECTION, EMULSION INTRAVENOUS at 12:30

## 2019-06-28 RX ADMIN — SODIUM CHLORIDE, POTASSIUM CHLORIDE, SODIUM LACTATE AND CALCIUM CHLORIDE: 600; 310; 30; 20 INJECTION, SOLUTION INTRAVENOUS at 12:15

## 2019-06-28 RX ADMIN — SODIUM CHLORIDE, POTASSIUM CHLORIDE, SODIUM LACTATE AND CALCIUM CHLORIDE: 600; 310; 30; 20 INJECTION, SOLUTION INTRAVENOUS at 10:53

## 2019-06-28 ASSESSMENT — PULMONARY FUNCTION TESTS
PIF_VALUE: 1

## 2019-06-28 ASSESSMENT — PAIN SCALES - GENERAL
PAINLEVEL_OUTOF10: 0

## 2019-06-28 ASSESSMENT — PAIN - FUNCTIONAL ASSESSMENT: PAIN_FUNCTIONAL_ASSESSMENT: 0-10

## 2019-06-28 ASSESSMENT — ENCOUNTER SYMPTOMS: SHORTNESS OF BREATH: 0

## 2019-06-28 NOTE — H&P
drugs. Family History:     Family History   Problem Relation Age of Onset    Diabetes Father     Bipolar Disorder Sister     Lung Cancer Paternal Grandmother     Heart Attack Paternal Grandfather        Review of Systems:     Positive and Negative as described in HPI. CONSTITUTIONAL:  negative for fevers, chills, sweats, fatigue, weight loss  HEENT:  negative for vision, hearing changes, runny nose, throat pain  RESPIRATORY:  negative for shortness of breath, cough, congestion, wheezing. CARDIOVASCULAR:  negative for chest pain, palpitations. GASTROINTESTINAL: See HPI.  negative for nausea, vomiting, diarrhea,  change in bowel habits, abdominal pain   GENITOURINARY:  negative for difficulty of urination, burning with urination, frequency   INTEGUMENT:  negative for rash, skin lesions, easy bruising   HEMATOLOGIC/LYMPHATIC:  negative for swelling/edema   ALLERGIC/IMMUNOLOGIC:  negative for urticaria , itching  ENDOCRINE:  negative increase in drinking, increase in urination, hot or cold intolerance  MUSCULOSKELETAL:  negative joint pains, muscle aches, swelling of joints  NEUROLOGICAL:  negative for headaches, dizziness, lightheadedness, numbness, pain, tingling extremities  BEHAVIOR/PSYCH:  negative for depression, anxiety    Physical Exam:   /75   Pulse 80   Temp 98.1 °F (36.7 °C) (Oral)   Resp 14   Ht 5' 1\" (1.549 m)   Wt 182 lb 8 oz (82.8 kg)   SpO2 97%   BMI 34.48 kg/m²       General Appearance:  alert, well appearing, and in no acute distress  Mental status: oriented to person, place, and time with normal affect  Head:  normocephalic, atraumatic.   Eye: no icterus, redness, pupils equal and reactive, extraocular eye movements intact, conjunctiva clear  Ear: normal external ear, no discharge, hearing intact  Nose:  no drainage noted  Mouth: mucous membranes moist  Neck: supple, no carotid bruits, thyroid not palpable  Lungs: Bilateral equal air entry, clear to ausculation, no wheezing,

## 2019-06-28 NOTE — ANESTHESIA PRE PROCEDURE
Laterality Date    COLONOSCOPY      ENDOMETRIAL ABLATION      ENDOMETRIAL ABLATION      LITHOTRIPSY      TUBAL LIGATION      TUBAL LIGATION      WISDOM TOOTH EXTRACTION         Social History:    Social History     Tobacco Use    Smoking status: Never Smoker    Smokeless tobacco: Never Used   Substance Use Topics    Alcohol use: Yes     Alcohol/week: 0.0 oz     Comment: Socially                                Counseling given: Not Answered      Vital Signs (Current):   Vitals:    06/28/19 1034 06/28/19 1035   BP:  105/75   Pulse:  80   Resp:  14   Temp:  98.1 °F (36.7 °C)   TempSrc: Oral Oral   SpO2:  97%   Weight: 182 lb 8 oz (82.8 kg)    Height: 5' 1\" (1.549 m)                                               BP Readings from Last 3 Encounters:   06/28/19 105/75   03/08/19 127/82   02/08/19 124/89       NPO Status:                                                                                 BMI:   Wt Readings from Last 3 Encounters:   06/28/19 182 lb 8 oz (82.8 kg)   03/08/19 185 lb 3.2 oz (84 kg)   02/08/19 187 lb (84.8 kg)     Body mass index is 34.48 kg/m². CBC:   Lab Results   Component Value Date    WBC 4.9 11/03/2018    RBC 4.22 11/03/2018    RBC 4.02 12/16/2011    HGB 12.4 11/03/2018    HCT 39.1 11/03/2018    MCV 92.7 11/03/2018    RDW 13.5 11/03/2018     11/03/2018     12/16/2011       CMP:   Lab Results   Component Value Date     11/03/2018    K 4.2 11/03/2018     11/03/2018    CO2 25 11/03/2018    BUN 9 11/03/2018    CREATININE 0.72 11/03/2018    GFRAA >60 11/03/2018    LABGLOM >60 11/03/2018    GLUCOSE 106 11/03/2018    GLUCOSE 90 12/16/2011    PROT 7.5 11/03/2018    CALCIUM 9.3 11/03/2018    BILITOT 0.52 11/03/2018    ALKPHOS 62 11/03/2018    AST 12 11/03/2018    ALT 15 11/03/2018       POC Tests: No results for input(s): POCGLU, POCNA, POCK, POCCL, POCBUN, POCHEMO, POCHCT in the last 72 hours.     Coags: No results found for: PROTIME, INR, APTT    HCG (If

## 2019-07-16 ENCOUNTER — TELEPHONE (OUTPATIENT)
Dept: GASTROENTEROLOGY | Age: 44
End: 2019-07-16

## 2019-08-14 ENCOUNTER — HOSPITAL ENCOUNTER (EMERGENCY)
Age: 44
Discharge: HOME | End: 2019-08-15
Attending: EMERGENCY MEDICINE
Payer: COMMERCIAL

## 2019-08-14 VITALS
TEMPERATURE: 98.1 F | OXYGEN SATURATION: 93 % | HEIGHT: 61 IN | HEART RATE: 80 BPM | WEIGHT: 180 LBS | BODY MASS INDEX: 33.99 KG/M2 | RESPIRATION RATE: 20 BRPM | SYSTOLIC BLOOD PRESSURE: 115 MMHG | DIASTOLIC BLOOD PRESSURE: 71 MMHG

## 2019-08-14 DIAGNOSIS — R00.2 PALPITATIONS: Primary | ICD-10-CM

## 2019-08-14 LAB
ABSOLUTE EOS #: 0.13 K/UL (ref 0–0.44)
ABSOLUTE IMMATURE GRANULOCYTE: 0.05 K/UL (ref 0–0.3)
ABSOLUTE LYMPH #: 2.47 K/UL (ref 1.1–3.7)
ABSOLUTE MONO #: 0.62 K/UL (ref 0.1–1.2)
ANION GAP SERPL CALCULATED.3IONS-SCNC: 15 MMOL/L (ref 9–17)
BASOPHILS # BLD: 0 % (ref 0–2)
BASOPHILS ABSOLUTE: 0.03 K/UL (ref 0–0.2)
BUN BLDV-MCNC: 12 MG/DL (ref 6–20)
BUN/CREAT BLD: 15 (ref 9–20)
CALCIUM SERPL-MCNC: 9.9 MG/DL (ref 8.6–10.4)
CHLORIDE BLD-SCNC: 98 MMOL/L (ref 98–107)
CO2: 25 MMOL/L (ref 20–31)
CREAT SERPL-MCNC: 0.79 MG/DL (ref 0.5–0.9)
D-DIMER QUANTITATIVE: 0.3 MG/L FEU
DIFFERENTIAL TYPE: ABNORMAL
EOSINOPHILS RELATIVE PERCENT: 1 % (ref 1–4)
GFR AFRICAN AMERICAN: >60 ML/MIN
GFR NON-AFRICAN AMERICAN: >60 ML/MIN
GFR SERPL CREATININE-BSD FRML MDRD: ABNORMAL ML/MIN/{1.73_M2}
GFR SERPL CREATININE-BSD FRML MDRD: ABNORMAL ML/MIN/{1.73_M2}
GLUCOSE BLD-MCNC: 101 MG/DL (ref 70–99)
HCT VFR BLD CALC: 39.4 % (ref 36.3–47.1)
HEMOGLOBIN: 12.9 G/DL (ref 11.9–15.1)
IMMATURE GRANULOCYTES: 1 %
LYMPHOCYTES # BLD: 23 % (ref 24–43)
MCH RBC QN AUTO: 29.9 PG (ref 25.2–33.5)
MCHC RBC AUTO-ENTMCNC: 32.7 G/DL (ref 28.4–34.8)
MCV RBC AUTO: 91.2 FL (ref 82.6–102.9)
MONOCYTES # BLD: 6 % (ref 3–12)
MYOGLOBIN: 161 NG/ML (ref 25–58)
NRBC AUTOMATED: 0 PER 100 WBC
PDW BLD-RTO: 13.2 % (ref 11.8–14.4)
PLATELET # BLD: 303 K/UL (ref 138–453)
PLATELET ESTIMATE: ABNORMAL
PMV BLD AUTO: 11.4 FL (ref 8.1–13.5)
POTASSIUM SERPL-SCNC: 3.4 MMOL/L (ref 3.7–5.3)
RBC # BLD: 4.32 M/UL (ref 3.95–5.11)
RBC # BLD: ABNORMAL 10*6/UL
SEG NEUTROPHILS: 69 % (ref 36–65)
SEGMENTED NEUTROPHILS ABSOLUTE COUNT: 7.34 K/UL (ref 1.5–8.1)
SODIUM BLD-SCNC: 138 MMOL/L (ref 135–144)
TROPONIN INTERP: ABNORMAL
TROPONIN T: ABNORMAL NG/ML
TROPONIN, HIGH SENSITIVITY: <6 NG/L (ref 0–14)
TSH SERPL DL<=0.05 MIU/L-ACNC: 2.57 MIU/L (ref 0.3–5)
WBC # BLD: 10.6 K/UL (ref 3.5–11.3)
WBC # BLD: ABNORMAL 10*3/UL

## 2019-08-14 PROCEDURE — 99284 EMERGENCY DEPT VISIT MOD MDM: CPT

## 2019-08-14 PROCEDURE — 85379 FIBRIN DEGRADATION QUANT: CPT

## 2019-08-14 PROCEDURE — 83874 ASSAY OF MYOGLOBIN: CPT

## 2019-08-14 PROCEDURE — 2580000003 HC RX 258: Performed by: NURSE PRACTITIONER

## 2019-08-14 PROCEDURE — 80048 BASIC METABOLIC PNL TOTAL CA: CPT

## 2019-08-14 PROCEDURE — 84484 ASSAY OF TROPONIN QUANT: CPT

## 2019-08-14 PROCEDURE — 85025 COMPLETE CBC W/AUTO DIFF WBC: CPT

## 2019-08-14 PROCEDURE — 84443 ASSAY THYROID STIM HORMONE: CPT

## 2019-08-14 PROCEDURE — 93005 ELECTROCARDIOGRAM TRACING: CPT

## 2019-08-14 RX ORDER — 0.9 % SODIUM CHLORIDE 0.9 %
1000 INTRAVENOUS SOLUTION INTRAVENOUS ONCE
Status: COMPLETED | OUTPATIENT
Start: 2019-08-14 | End: 2019-08-14

## 2019-08-14 RX ADMIN — SODIUM CHLORIDE 1000 ML: 9 INJECTION, SOLUTION INTRAVENOUS at 22:47

## 2019-08-15 ENCOUNTER — TELEPHONE (OUTPATIENT)
Dept: FAMILY MEDICINE CLINIC | Age: 44
End: 2019-08-15

## 2019-08-15 LAB
EKG ATRIAL RATE: 94 BPM
EKG P AXIS: 41 DEGREES
EKG P-R INTERVAL: 178 MS
EKG Q-T INTERVAL: 368 MS
EKG QRS DURATION: 78 MS
EKG QTC CALCULATION (BAZETT): 460 MS
EKG R AXIS: -3 DEGREES
EKG T AXIS: 11 DEGREES
EKG VENTRICULAR RATE: 94 BPM

## 2019-08-15 ASSESSMENT — ENCOUNTER SYMPTOMS
WHEEZING: 0
COUGH: 0
DIARRHEA: 0
SHORTNESS OF BREATH: 0
SINUS PRESSURE: 0
COLOR CHANGE: 0
VOMITING: 0
NAUSEA: 0
CONSTIPATION: 0
RHINORRHEA: 0
SORE THROAT: 0
ABDOMINAL PAIN: 0

## 2019-08-15 NOTE — ED PROVIDER NOTES
[facility] ED  eMERGENCY dEPARTMENT eNCOUnter      Pt Name: Tamir Link  MRN: [id number]  [place] 1975  Date of evaluation: 8/14/2019  Provider: Alexandria Bowden NP, MATT - [address]       Chief Complaint   Patient presents with    Tachycardia    Anxiety         HISTORY OF PRESENT ILLNESS  (Location/Symptom, Timing/Onset, Context/Setting, Quality, Duration, Modifying Factors, Severity.)   Tamir Link is a 38 y.o. female who presents to the emergency department by private vehicle for evaluation of tachycardia. The patient states that she worked out earlier today. She states that afterwards she was cleaning up from dinner and she felt like her heart was racing. She states that she looked down at her apple watch and her heart rate was in the 120s. She states that she decided to sit down and relax and her heart rate still remained in the 110s. She has not been expressing any nausea or vomiting. Nursing Notes were reviewed. ALLERGIES     Metformin and related    CURRENT MEDICATIONS       Discharge Medication List as of 8/14/2019 11:44 PM      CONTINUE these medications which have NOT CHANGED    Details   albuterol sulfate HFA (PROAIR HFA) 108 (90 Base) MCG/ACT inhaler Inhale 2 puffs into the lungs every 6 hours as needed for Wheezing or Shortness of Breath (cough), Disp-8 g, R-3Normal             PAST MEDICAL HISTORY         Diagnosis Date    Asthma     Dyslipidemia (high LDL; low HDL) 5/22/2017    History of endometrial ablation     History of kidney stones 5/22/2017    History of tubal ligation     Hyperglycemia 5/22/2017    Hypertriglyceridemia 5/22/2017    Obesity (BMI 30-39. 9) 5/22/2017    Vision abnormalities     glasses/contacts       SURGICAL HISTORY           Procedure Laterality Date    COLONOSCOPY      COLONOSCOPY  06/28/2019    COLONOSCOPY N/A 6/28/2019    COLORECTAL CANCER SCREENING, NOT HIGH RISK performed by Shabnam Kirk MD at [address] [place] [address]

## 2019-08-22 NOTE — PROGRESS NOTES
Value Ref Range Status    WBC 08/14/2019 10.6  3.5 - 11.3 k/uL Final    RBC 08/14/2019 4.32  3.95 - 5.11 m/uL Final    Hemoglobin 08/14/2019 12.9  11.9 - 15.1 g/dL Final    Hematocrit 08/14/2019 39.4  36.3 - 47.1 % Final    MCV 08/14/2019 91.2  82.6 - 102.9 fL Final    MCH 08/14/2019 29.9  25.2 - 33.5 pg Final    MCHC 08/14/2019 32.7  28.4 - 34.8 g/dL Final    RDW 08/14/2019 13.2  11.8 - 14.4 % Final    Platelets 56/65/0713 303  138 - 453 k/uL Final    MPV 08/14/2019 11.4  8.1 - 13.5 fL Final    NRBC Automated 08/14/2019 0.0  0.0 per 100 WBC Final    Differential Type 08/14/2019 NOT REPORTED   Final    Seg Neutrophils 08/14/2019 69* 36 - 65 % Final    Lymphocytes 08/14/2019 23* 24 - 43 % Final    Monocytes 08/14/2019 6  3 - 12 % Final    Eosinophils % 08/14/2019 1  1 - 4 % Final    Basophils 08/14/2019 0  0 - 2 % Final    Immature Granulocytes 08/14/2019 1* 0 % Final    Segs Absolute 08/14/2019 7.34  1.50 - 8.10 k/uL Final    Absolute Lymph # 08/14/2019 2.47  1.10 - 3.70 k/uL Final    Absolute Mono # 08/14/2019 0.62  0.10 - 1.20 k/uL Final    Absolute Eos # 08/14/2019 0.13  0.00 - 0.44 k/uL Final    Basophils Absolute 08/14/2019 0.03  0.00 - 0.20 k/uL Final    Absolute Immature Granulocyte 08/14/2019 0.05  0.00 - 0.30 k/uL Final    WBC Morphology 08/14/2019 NOT REPORTED   Final    RBC Morphology 08/14/2019 NOT REPORTED   Final    Platelet Estimate 81/99/0332 NOT REPORTED   Final    Glucose 08/14/2019 101* 70 - 99 mg/dL Final    BUN 08/14/2019 12  6 - 20 mg/dL Final    CREATININE 08/14/2019 0.79  0.50 - 0.90 mg/dL Final    Bun/Cre Ratio 08/14/2019 15  9 - 20 Final    Calcium 08/14/2019 9.9  8.6 - 10.4 mg/dL Final    Sodium 08/14/2019 138  135 - 144 mmol/L Final    Potassium 08/14/2019 3.4* 3.7 - 5.3 mmol/L Final    Chloride 08/14/2019 98  98 - 107 mmol/L Final    CO2 08/14/2019 25  20 - 31 mmol/L Final    Anion Gap 08/14/2019 15  9 - 17 mmol/L Final    GFR Non- This Encounter   Procedures    POCT glycosylated hemoglobin (Hb A1C)     Orders Placed This Encounter   Medications    busPIRone (BUSPAR) 7.5 MG tablet     Sig: Take 1 tablet by mouth 2 times daily     Dispense:  60 tablet     Refill:  0    Liraglutide (VICTOZA) 18 MG/3ML SOPN SC injection     Sig: Inject 1.8 mg into the skin daily Dose increased 10/3/2018     Dispense:  6 pen     Refill:  3    Insulin Pen Needle 31G X 5 MM MISC     Si each by Does not apply route daily TO BE USED with VICTOZA, DAILY     Dispense:  100 each     Refill:  3     There are no discontinued medications. The patient's past medical, surgical, social, and family history as well as her   current medications and allergies were reviewed as documented in today's encounter. Medications, labs, diagnostic studies, consultations and follow-up as documented in this encounter. Return in about 4 weeks (around 2019) for medication recheck, Buspirone, Victoza restarted. Crystal received counseling on the following healthy behaviors: nutrition, exercise and medication adherence  Reviewed prior labs and health maintenance  Continue current medications, diet and exercise. Discussed use, benefit, and side effects of prescribed medications. Barriers to medication compliance addressed. Patient given educational materials - see patient instructions  Was a self-tracking handout given in paper form or via [employer]? Yes    Requested Prescriptions     Signed Prescriptions Disp Refills    busPIRone (BUSPAR) 7.5 MG tablet 60 tablet 0     Sig: Take 1 tablet by mouth 2 times daily    Liraglutide (VICTOZA) 18 MG/3ML SOPN SC injection 6 pen 3     Sig: Inject 1.8 mg into the skin daily Dose increased 10/3/2018    Insulin Pen Needle 31G X 5 MM MISC 100 each 3     Si each by Does not apply route daily TO BE USED with VICTOZA, DAILY       All patient questions answered. Patient voiced understanding.     Quality Measures    Body mass index is

## 2019-08-22 NOTE — PATIENT INSTRUCTIONS
Patient Education        Hypokalemia: Care Instructions  Your Care Instructions    Hypokalemia (say \"ku-am-ara-ZAK-kimberlee-uh\") is a low level of potassium. The heart, muscles, kidneys, and nervous system all need potassium to work well. This problem has many different causes. Kidney problems, diet, and medicines like diuretics and laxatives can cause it. So can vomiting or diarrhea. In some cases, cancer is the cause. Your doctor may do tests to find the cause of your low potassium levels. You may need medicines to bring your potassium levels back to normal. You may also need regular blood tests to check your potassium. If you have very low potassium, you may need intravenous (IV) medicines. You also may need tests to check the electrical activity of your heart. Heart problems caused by low potassium levels can be very serious. Follow-up care is a key part of your treatment and safety. Be sure to make and go to all appointments, and call your doctor if you are having problems. It's also a good idea to know your test results and keep a list of the medicines you take. How can you care for yourself at home? · If your doctor recommends it, eat foods that have a lot of potassium. These include fresh fruits, juices, and vegetables. They also include nuts, beans, and milk. · Be safe with medicines. If your doctor prescribes medicines or potassium supplements, take them exactly as directed. Call your doctor if you have any problems with your medicines. · Get your potassium levels tested as often as your doctor tells you. When should you call for help? Call 911 anytime you think you may need emergency care. For example, call if:    · You feel like your heart is missing beats.  Heart problems caused by low potassium can cause death.     · You passed out (lost consciousness).     · You have a seizure.    Call your doctor now or seek immediate medical care if:    · You feel weak or unusually tired.     · You have severe false positive results with certain medical tests. You may need to stop using the medicine for at least 48 hours before your test. Tell any doctor who treats you that you are using buspirone. Store at room temperature away from moisture, heat, and light. What happens if I miss a dose? Take the missed dose as soon as you remember. Skip the missed dose if it is almost time for your next scheduled dose. Do not take extra medicine to make up the missed dose. What happens if I overdose? Seek emergency medical attention or call the Poison Help line at 1-703.609.7370. What should I avoid while taking buspirone? This medication may impair your thinking or reactions. Be careful if you drive or do anything that requires you to be alert. Drinking alcohol may increase certain side effects of buspirone. Grapefruit and grapefruit juice may interact with buspirone and lead to unwanted side effects. Discuss the use of grapefruit products with your doctor. What are the possible side effects of buspirone? Get emergency medical help if you have signs of an allergic reaction: hives; difficult breathing; swelling of your face, lips, tongue, or throat. Call your doctor at once if you have:  · chest pain;  · shortness of breath; or  · a light-headed feeling, like you might pass out. Common side effects may include:  · headache;  · dizziness, drowsiness;  · sleep problems (insomnia);  · nausea, upset stomach; or  · feeling nervous or excited. This is not a complete list of side effects and others may occur. Call your doctor for medical advice about side effects. You may report side effects to FDA at 8-948-BEB-6625. What other drugs will affect buspirone? Taking this medicine with other drugs that make you sleepy or slow your breathing can worsen these effects. Ask your doctor before taking buspirone with a sleeping pill, narcotic pain medicine, muscle relaxer, or medicine for anxiety, depression, or seizures.   Other drugs may interact with buspirone, including prescription and over-the-counter medicines, vitamins, and herbal products. Tell each of your health care providers about all medicines you use now and any medicine you start or stop using. Where can I get more information? Your pharmacist can provide more information about buspirone. Remember, keep this and all other medicines out of the reach of children, never share your medicines with others, and use this medication only for the indication prescribed. Every effort has been made to ensure that the information provided by Sarah [address] is accurate, up-to-date, and complete, but no guarantee is made to that effect. Drug information contained herein may be time sensitive. [facility] information has been compiled for use by healthcare practitioners and consumers in the [place] and therefore [facility] does not warrant that uses outside of the [place] are appropriate, unless specifically indicated otherwise. [facility]'s drug information does not endorse drugs, diagnose patients or recommend therapy. [facility] drug information is an informational resource designed to assist licensed healthcare practitioners in caring for their patients and/or to serve consumers viewing this service as a supplement to, and not a substitute for, the expertise, skill, knowledge and judgment of healthcare practitioners. The absence of a warning for a given drug or drug combination in no way should be construed to indicate that the drug or drug combination is safe, effective or appropriate for any given patient. [facility] does not assume any responsibility for any aspect of healthcare administered with the aid of information [facility] provides. The information contained herein is not intended to cover all possible uses, directions, precautions, warnings, drug interactions, allergic reactions, or adverse effects.  If you have questions about the drugs you are taking, check with your

## 2019-08-23 ENCOUNTER — OFFICE VISIT (OUTPATIENT)
Dept: FAMILY MEDICINE CLINIC | Age: 44
End: 2019-08-23
Payer: COMMERCIAL

## 2019-08-23 VITALS
OXYGEN SATURATION: 96 % | HEART RATE: 85 BPM | DIASTOLIC BLOOD PRESSURE: 80 MMHG | WEIGHT: 186.4 LBS | HEIGHT: 61 IN | BODY MASS INDEX: 35.19 KG/M2 | SYSTOLIC BLOOD PRESSURE: 120 MMHG

## 2019-08-23 DIAGNOSIS — E66.9 OBESITY (BMI 30-39.9): ICD-10-CM

## 2019-08-23 DIAGNOSIS — R73.03 PREDIABETES: Primary | ICD-10-CM

## 2019-08-23 DIAGNOSIS — R73.9 HYPERGLYCEMIA: ICD-10-CM

## 2019-08-23 DIAGNOSIS — F41.9 ANXIETY: ICD-10-CM

## 2019-08-23 DIAGNOSIS — R00.2 PALPITATIONS: ICD-10-CM

## 2019-08-23 LAB — HBA1C MFR BLD: 5.8 %

## 2019-08-23 PROCEDURE — 83036 HEMOGLOBIN GLYCOSYLATED A1C: CPT | Performed by: FAMILY MEDICINE

## 2019-08-23 PROCEDURE — 99213 OFFICE O/P EST LOW 20 MIN: CPT | Performed by: FAMILY MEDICINE

## 2019-08-23 RX ORDER — BUSPIRONE HYDROCHLORIDE 7.5 MG/1
7.5 TABLET ORAL 2 TIMES DAILY
Qty: 60 TABLET | Refills: 0 | Status: SHIPPED | OUTPATIENT
Start: 2019-08-23 | End: 2019-09-22

## 2019-08-23 ASSESSMENT — ANXIETY QUESTIONNAIRES
7. FEELING AFRAID AS IF SOMETHING AWFUL MIGHT HAPPEN: 2-OVER HALF THE DAYS
GAD7 TOTAL SCORE: 15
6. BECOMING EASILY ANNOYED OR IRRITABLE: 2-OVER HALF THE DAYS
2. NOT BEING ABLE TO STOP OR CONTROL WORRYING: 2-OVER HALF THE DAYS
1. FEELING NERVOUS, ANXIOUS, OR ON EDGE: 3-NEARLY EVERY DAY
4. TROUBLE RELAXING: 2-OVER HALF THE DAYS
5. BEING SO RESTLESS THAT IT IS HARD TO SIT STILL: 2-OVER HALF THE DAYS
3. WORRYING TOO MUCH ABOUT DIFFERENT THINGS: 2-OVER HALF THE DAYS

## 2019-08-25 ASSESSMENT — ENCOUNTER SYMPTOMS
VOMITING: 0
COLOR CHANGE: 0
EYE PAIN: 0
CONSTIPATION: 0
SORE THROAT: 0
DIARRHEA: 0
TROUBLE SWALLOWING: 0
RESPIRATORY NEGATIVE: 1
NAUSEA: 0
APNEA: 0
CHEST TIGHTNESS: 0
ABDOMINAL PAIN: 0
COUGH: 0
SHORTNESS OF BREATH: 0

## 2019-09-20 ENCOUNTER — OFFICE VISIT (OUTPATIENT)
Dept: FAMILY MEDICINE CLINIC | Age: 44
End: 2019-09-20
Payer: COMMERCIAL

## 2019-09-20 ENCOUNTER — TELEPHONE (OUTPATIENT)
Dept: FAMILY MEDICINE CLINIC | Age: 44
End: 2019-09-20

## 2019-09-20 VITALS
HEART RATE: 81 BPM | OXYGEN SATURATION: 98 % | BODY MASS INDEX: 36.09 KG/M2 | WEIGHT: 191 LBS | SYSTOLIC BLOOD PRESSURE: 130 MMHG | DIASTOLIC BLOOD PRESSURE: 93 MMHG

## 2019-09-20 DIAGNOSIS — R73.03 PREDIABETES: ICD-10-CM

## 2019-09-20 DIAGNOSIS — R73.9 HYPERGLYCEMIA: ICD-10-CM

## 2019-09-20 DIAGNOSIS — R63.8 UNABLE TO LOSE WEIGHT: ICD-10-CM

## 2019-09-20 DIAGNOSIS — F41.9 ANXIETY: ICD-10-CM

## 2019-09-20 DIAGNOSIS — R03.0 ELEVATED BP WITHOUT DIAGNOSIS OF HYPERTENSION: ICD-10-CM

## 2019-09-20 PROCEDURE — 99213 OFFICE O/P EST LOW 20 MIN: CPT | Performed by: FAMILY MEDICINE

## 2019-09-20 RX ORDER — PHENTERMINE HYDROCHLORIDE 37.5 MG/1
37.5 TABLET ORAL
Qty: 30 TABLET | Refills: 0 | Status: SHIPPED | OUTPATIENT
Start: 2019-09-20 | End: 2019-10-20

## 2019-09-20 ASSESSMENT — ENCOUNTER SYMPTOMS
COUGH: 0
EYE PAIN: 0
CONSTIPATION: 0
SHORTNESS OF BREATH: 0
ABDOMINAL PAIN: 0
RESPIRATORY NEGATIVE: 1
DIARRHEA: 0
VOMITING: 0
NAUSEA: 0
COLOR CHANGE: 0
TROUBLE SWALLOWING: 0
CHEST TIGHTNESS: 0
APNEA: 0
SORE THROAT: 0

## 2019-09-20 NOTE — PATIENT INSTRUCTIONS
Patient Education        phentermine  Pronunciation:  SAGAR doty Armond Nath  Brand: Adipex-P, Guillermo Lozoya  What is the most important information I should know about phentermine? You should not use this medicine if you have glaucoma, overactive thyroid, severe heart problems, uncontrolled high blood pressure, advanced coronary artery disease, extreme agitation, or a history of drug abuse. Do not use this medicine if you have used an MAO inhibitor in the past 14 days, such as isocarboxazid, linezolid, methylene blue injection, phenelzine, rasagiline, selegiline, or tranylcypromine. What is phentermine? Phentermine is similar to an amphetamine. Phentermine stimulates the central nervous system (nerves and brain), which increases your heart rate and blood pressure and decreases your appetite. Phentermine is used together with diet and exercise to treat obesity, especially in people with risk factors such as high blood pressure, high cholesterol, or diabetes. Phentermine may also be used for purposes not listed in this medication guide. What should I discuss with my healthcare provider before taking phentermine? You should not use phentermine if you are allergic to it, or if you have:  · a history of heart disease (coronary artery disease, heart rhythm problems, congestive heart failure, stroke);  · severe or uncontrolled high blood pressure;  · overactive thyroid;  · glaucoma;  · extreme agitation or nervousness;  · a history of drug abuse; or  · if you take other diet pills. Do not use phentermine if you have used an MAO inhibitor in the past 14 days. A dangerous drug interaction could occur. MAO inhibitors include isocarboxazid, linezolid, methylene blue injection, phenelzine, rasagiline, selegiline, tranylcypromine, and others. Weight loss during pregnancy can harm an unborn baby, even if you are overweight. Do not use phentermine if you are pregnant.  Tell your doctor right away if you become pregnant during nausea, vomiting, diarrhea, stomach cramps, feeling tired or depressed, irregular heartbeats, weak pulse, seizure, or slow breathing (breathing may stop). What should I avoid while taking phentermine? Avoid driving or hazardous activity until you know how this medicine will affect you. Your reactions could be impaired. Drinking alcohol with this medicine can cause side effects. What are the possible side effects of phentermine? Get emergency medical help if you have signs of an allergic reaction: hives; difficult breathing; swelling of your face, lips, tongue, or throat. Call your doctor at once if you have:  · feeling short of breath, even with mild exertion;  · chest pain, feeling like you might pass out;  · swelling in your ankles or feet;  · pounding heartbeats or fluttering in your chest;  · tremors, feeling restless, trouble sleeping;  · unusual changes in mood or behavior; or  · increased blood pressure --severe headache, blurred vision, pounding in your neck or ears, anxiety, nosebleed. Common side effects may include:  · itching;  · dizziness, headache;  · dry mouth, unpleasant taste;  · diarrhea, constipation, stomach pain; or  · increased or decreased interest in sex. This is not a complete list of side effects and others may occur. Call your doctor for medical advice about side effects. You may report side effects to FDA at 5-882-FDA-2392. What other drugs will affect phentermine? Taking phentermine together with other diet medications such as fenfluramine (Phen-Fen) or dexfenfluramine (Redux) can cause a rare fatal lung disorder called pulmonary hypertension. Do not take phentermine with any other diet medications without your doctor's advice. Many drugs can affect phentermine. This includes prescription and over-the-counter medicines, vitamins, and herbal products. Not all possible interactions are listed here.  Tell your doctor about all your current medicines and any medicine you start or

## 2019-09-20 NOTE — PROGRESS NOTES
Elevated BP without diagnosis of hypertension  Ongoing  Patient instructed to check BP at home regularly. Continue the same medication. Report for any chest pains, shortness of breath, headaches, and lightheadedness. Cut down on your salt intake. Cut down on caffeinated drinks. Continue to check your blood pressure regularly. Call the office if your blood pressure continue to be higher than 140/90 or 90/50. Health Maintenance Due   Topic Date Due    Flu vaccine (1) 09/01/2019      Return in about 4 weeks (around 10/18/2019) for Weight Mgmt, Adipex #2. Celia received counseling on the following healthy behaviors: nutrition, exercise and medication adherence  Reviewed prior labs and health maintenance  Continue current medications, diet and exercise. Discussed use, benefit, and side effects of prescribed medications. Barriers to medication compliance addressed. Patient given educational materials - see patient instructions  Was a self-tracking handout given in paper form or via To[employer]t? Yes    Requested Prescriptions     Signed Prescriptions Disp Refills    phentermine (ADIPEX-P) 37.5 MG tablet 30 tablet 0     Sig: Take 1 tablet by mouth every morning (before breakfast) for 30 days. Controlled Substance Monitoring:    Acute and Chronic Pain Monitoring:   RX Monitoring 9/20/2019   Attestation -   Periodic Controlled Substance Monitoring No signs of potential drug abuse or diversion identified. Chronic Pain > 50 MEDD -       All patient questions answered. Patient voiced understanding. Quality Measures    Body mass index is 36.09 kg/m². Elevated. Weight control planned discussed medically supervised diet with primary care physician, hospital weight loss program and daily exercise regimen. BP: (!) 130/93 Blood pressure is high. Treatment plan consists of Weight Reduction, DASH Eating Plan and Dietary Sodium Restriction.     Lab Results   Component Value Date    MNIZIHUYKKJHEO 07 11/03/2018    (goal LDL reduction with dx if diabetes is 50% LDL reduction)      PHQ Scores 2/8/2019 9/21/2018 3/14/2018 5/22/2017 5/22/2017 5/20/2016 5/20/2016   PHQ2 Score 0 0 0 0 0 0 0   PHQ9 Score 0 0 0 0 0 0 0     Interpretation of Total Score Depression Severity: 1-4 = Minimal depression, 5-9 = Mild depression, 10-14 = Moderate depression, 15-19 = Moderately severe depression, 20-27 = Severe depression   This note was completed by using the assistance of a speech-recognition program. However, inadvertent computerized transcription errors may be present. Although every effort was made to ensure accuracy, no guarantees can be provided that every mistake has been identified and corrected by editing   An electronic signature was used to authenticate this note.   --MATT Figueroa - CNP on 9/20/2019 at 11:45 AM

## 2020-02-13 ENCOUNTER — OFFICE VISIT (OUTPATIENT)
Dept: FAMILY MEDICINE CLINIC | Age: 45
End: 2020-02-13
Payer: COMMERCIAL

## 2020-02-13 VITALS
SYSTOLIC BLOOD PRESSURE: 108 MMHG | BODY MASS INDEX: 38.89 KG/M2 | DIASTOLIC BLOOD PRESSURE: 70 MMHG | WEIGHT: 206 LBS | OXYGEN SATURATION: 97 % | HEART RATE: 83 BPM | HEIGHT: 61 IN

## 2020-02-13 PROBLEM — R94.31 ABNORMAL EKG: Status: ACTIVE | Noted: 2020-02-13

## 2020-02-13 PROBLEM — R53.82 CHRONIC FATIGUE: Status: ACTIVE | Noted: 2020-02-13

## 2020-02-13 PROBLEM — F32.0 CURRENT MILD EPISODE OF MAJOR DEPRESSIVE DISORDER WITHOUT PRIOR EPISODE (HCC): Status: ACTIVE | Noted: 2020-02-13

## 2020-02-13 PROBLEM — R55 PRE-SYNCOPE: Status: ACTIVE | Noted: 2020-02-13

## 2020-02-13 PROBLEM — R00.2 HEART PALPITATIONS: Status: ACTIVE | Noted: 2020-02-13

## 2020-02-13 PROBLEM — R23.2 HOT FLASHES: Status: ACTIVE | Noted: 2020-02-13

## 2020-02-13 PROCEDURE — G0444 DEPRESSION SCREEN ANNUAL: HCPCS | Performed by: FAMILY MEDICINE

## 2020-02-13 PROCEDURE — 99214 OFFICE O/P EST MOD 30 MIN: CPT | Performed by: FAMILY MEDICINE

## 2020-02-13 ASSESSMENT — ENCOUNTER SYMPTOMS
COUGH: 0
CHEST TIGHTNESS: 0
CONSTIPATION: 0
WHEEZING: 0
SHORTNESS OF BREATH: 1
ABDOMINAL PAIN: 0
ABDOMINAL DISTENTION: 0
DIARRHEA: 0
NAUSEA: 0
VOMITING: 0

## 2020-02-13 ASSESSMENT — PATIENT HEALTH QUESTIONNAIRE - PHQ9
SUM OF ALL RESPONSES TO PHQ QUESTIONS 1-9: 8
9. THOUGHTS THAT YOU WOULD BE BETTER OFF DEAD, OR OF HURTING YOURSELF: 0
SUM OF ALL RESPONSES TO PHQ9 QUESTIONS 1 & 2: 3
10. IF YOU CHECKED OFF ANY PROBLEMS, HOW DIFFICULT HAVE THESE PROBLEMS MADE IT FOR YOU TO DO YOUR WORK, TAKE CARE OF THINGS AT HOME, OR GET ALONG WITH OTHER PEOPLE: 1
4. FEELING TIRED OR HAVING LITTLE ENERGY: 3
7. TROUBLE CONCENTRATING ON THINGS, SUCH AS READING THE NEWSPAPER OR WATCHING TELEVISION: 0
6. FEELING BAD ABOUT YOURSELF - OR THAT YOU ARE A FAILURE OR HAVE LET YOURSELF OR YOUR FAMILY DOWN: 1
5. POOR APPETITE OR OVEREATING: 0
3. TROUBLE FALLING OR STAYING ASLEEP: 1
SUM OF ALL RESPONSES TO PHQ QUESTIONS 1-9: 8
8. MOVING OR SPEAKING SO SLOWLY THAT OTHER PEOPLE COULD HAVE NOTICED. OR THE OPPOSITE, BEING SO FIGETY OR RESTLESS THAT YOU HAVE BEEN MOVING AROUND A LOT MORE THAN USUAL: 0
2. FEELING DOWN, DEPRESSED OR HOPELESS: 3
1. LITTLE INTEREST OR PLEASURE IN DOING THINGS: 0

## 2020-02-13 NOTE — PATIENT INSTRUCTIONS
Patient Education        Palpitations: Care Instructions  Your Care Instructions    Heart palpitations are the uncomfortable sensation that your heart is beating fast or irregularly. You might feel pounding or fluttering in your chest. It might feel like your heart is skipping a beat. Although palpitations may be caused by a heart problem, they also occur because of stress, fatigue, or use of alcohol, caffeine, or nicotine. Many medicines, including diet pills, antihistamines, decongestants, and some herbal products, can cause heart palpitations. Nearly everyone has palpitations from time to time. Depending on your symptoms, your doctor may need to do more tests to try to find the cause of your palpitations. Follow-up care is a key part of your treatment and safety. Be sure to make and go to all appointments, and call your doctor if you are having problems. It's also a good idea to know your test results and keep a list of the medicines you take. How can you care for yourself at home? · Avoid caffeine, nicotine, and excess alcohol. · Do not take illegal drugs, such as methamphetamines and cocaine. · Do not take weight loss or diet medicines unless you talk with your doctor first.  · Get plenty of sleep. · Do not overeat. · If you have palpitations again, take deep breaths and try to relax. This may slow a racing heart. · If you start to feel lightheaded, lie down to avoid injuries that might result if you pass out and fall down. · Keep a record of your palpitations and bring it to your next doctor's appointment. Write down:  ? The date and time. ? Your pulse. (If your heart is beating fast, it may be hard to count your pulse.)  ? What you were doing when the palpitations started. ? How long the palpitations lasted. ? Any other symptoms. · If an activity causes palpitations, slow down or stop. Talk to your doctor before you do that activity again. · Take your medicines exactly as prescribed.  Call your doctor if you think you are having a problem with your medicine. When should you call for help? Call 911 anytime you think you may need emergency care. For example, call if:    · You passed out (lost consciousness).     · You have symptoms of a heart attack. These may include:  ? Chest pain or pressure, or a strange feeling in the chest.  ? Sweating. ? Shortness of breath. ? Pain, pressure, or a strange feeling in the back, neck, jaw, or upper belly or in one or both shoulders or arms. ? Lightheadedness or sudden weakness. ? A fast or irregular heartbeat. After you call  911, the  may tell you to chew 1 adult-strength or 2 to 4 low-dose aspirin. Wait for an ambulance. Do not try to drive yourself.     · You have symptoms of a stroke. These may include:  ? Sudden numbness, tingling, weakness, or loss of movement in your face, arm, or leg, especially on only one side of your body. ? Sudden vision changes. ? Sudden trouble speaking. ? Sudden confusion or trouble understanding simple statements. ? Sudden problems with walking or balance. ? A sudden, severe headache that is different from past headaches.    Call your doctor now or seek immediate medical care if:    · You have heart palpitations and:  ? Are dizzy or lightheaded, or you feel like you may faint. ? Have new or increased shortness of breath.    Watch closely for changes in your health, and be sure to contact your doctor if:    · You continue to have heart palpitations. Where can you learn more? Go to [email or web address] org and sign in to your [employer] account. Enter R508 in the Bomberbot box to learn more about \"Palpitations: Care Instructions. \"     If you do not have an account, please click on the \"Sign Up Now\" link. Current as of: April 9, 2019  Content Version: 12.3  © 5332-3448 Healthwise, Incorporated. Care instructions adapted under license by [facility] ([facility]).  If you have questions about a

## 2020-02-13 NOTE — PROGRESS NOTES
Visit Information    Have you changed or started any medications since your last visit including any over-the-counter medicines, vitamins, or herbal medicines? no   Are you having any side effects from any of your medications? -  no  Have you stopped taking any of your medications? Is so, why? -  no    Have you seen any other physician or provider since your last visit? No  Have you had any other diagnostic tests since your last visit? No  Have you been seen in the emergency room and/or had an admission to a hospital since we last saw you? No  Have you had your routine dental cleaning in the past 6 months? yes     Have you activated your [facility] account? If not, what are your barriers?  Yes     Patient Care Team:  Belén Tarango MD as PCP - General (Family Medicine)  Belén Tarango MD as PCP - [facility] Provider  MATT Sanders - CNP as Nurse Practitioner (Certified Nurse Practitioner)  Jazz Luque MD as Consulting Physician (Urology)    Medical History Review  Past Medical, Family, and Social History reviewed and does contribute to the patient presenting condition    Health Maintenance   Topic Date Due    DTaP/Tdap/Td vaccine (1 - Tdap) 07/03/2022 (Originally 2/22/1986)    A1C test (Diabetic or Prediabetic)  08/23/2020    Cervical cancer screen  09/21/2023    Lipid screen  11/03/2023    Shingles Vaccine (1 of 2) 02/22/2024    Colon cancer screen colonoscopy  06/28/2029    Flu vaccine  Completed    Pneumococcal 0-64 years Vaccine  Completed    HIV screen  Completed    Hepatitis A vaccine  Aged Out    Hepatitis B vaccine  Aged Out    Hib vaccine  Aged Out    Meningococcal (ACWY) vaccine  Aged Out

## 2020-02-13 NOTE — PROGRESS NOTES
Chief Complaint   Patient presents with    Anxiety     month on and off for symptoms     Palpitations     increased heartbeat     Dizziness     pressure in her head causing lightheaded felt like she was going to faint yesterday in the store     Fatigue       Patient comes today due to acute illness. HPI    Patient complains of racing heart and palpitations , for the last few days, it happens a few times a day, feels like racing heart , but on her iWatch it will show 70-67 BMP, while at  Rest.  When she has the episodes of palpitations which usually are lasting for a few seconds,  gets shortness of breath , feels tired, and feels like almost passing out. She never passed out. EKG on I watch shows sinus rhythm or arrhythmia but never atrial fibrillation  She says sometimes her heart rate goes down to 50's , 52-57    Blood pressure is low today, previously she had high blood pressure  She denies chest pain, leg swelling, orthopnea when having palpitations or otherwise. She denies sweating, denies shortness of breath with activities. Patient did have episodes of palpitations before. She is very worried about her anxiety related to her health. She did take a BuSpar for anxiety, which did help a bit with the anxiety and palpitations. She also tries to relax. She does admit to drinking coffee but only 1 cup Of coffee every day  She does not drink alcohol. She does not drink pop.       Patient was seen in emergency room on 8/14/2019 for palpitations and the EKG at that time showed left atrial enlargement, T wave nonspecific changes, which are new from the prior EKG done 11/3/2018 which was normal    Also in emergency room on 8/14/2019 at [facility] blood work showed low potassium of 3.4, normal TSH and normal d-dimer, with normal troponin    BP Readings from Last 3 Encounters:   02/13/20 108/70   09/20/19 (!) 130/93   08/23/19 120/80      Pulse  is normal today  Pulse Readings from Last 3 Encounters:   02/13/20 83   09/20/19 81   08/23/19 85     Patient does have strong family history of heart attack at a young age in her father at the age of 35years old but she says he has diabetes, she is not sure if type I or type 2 diabetes. Patient reports being tired all the time, changes in her menstrual cycle, has been spotting since December, today spotting again. Had uterine ablation. Has been having hot flashes for about 1 mo  There is unintentional weight gain of 15 pounds in 5 months. Patient denies cold intolerance, increased thirst or appetite. She denies headache. She denies diarrhea or constipation. Wt Readings from Last 3 Encounters:   02/13/20 206 lb (93.4 kg)   09/20/19 191 lb (86.6 kg)   08/23/19 186 lb 6.4 oz (84.6 kg)       Patient admits that for the past 1 month she feels depressed regarding her health and she wants to lose weight, which makes her even more depressed, that she cannot lose weight  Patient says she feels Anxious about her health, especially when having palpitations, and Buspar prn, helps    Never had depression in her life and she was never on medications for depression. I have been depressed, my body wants not to do anything. She feels like she is inconsistent with things    PHQ-2 Over the past 2 weeks, how often have you been bothered by any of the following problems? Little interest or pleasure in doing things: Not at all  Feeling down, depressed, or hopeless: Nearly every day  PHQ-2 Score: 3  PHQ-9 Over the past 2 weeks, how often have you been bothered by any of the following problems?   Trouble falling or staying asleep, or sleeping too much: Several days  Feeling tired or having little energy: Nearly every day  Poor appetite or overeating: Not at all  Feeling bad about yourself - or that you are a failure or have let yourself or your family down: Several days  Trouble concentrating on things, such as reading the newspaper or watching television: Not at all  Moving or speaking so slowly that other people could have noticed. Or the opposite - being so fidgety or restless that you have been moving around a lot more than usual: Not at all  Thoughts that you would be better off dead, or of hurting yourself in some way: Not at all  If you checked off any problems, how difficult have these problems made it for you to do your work, take care of things at home, or get along with other people?: Somewhat difficult  PHQ-9 Completed?: Complete  PHQ-9 Total Score: 8       [x]5-9 = Mild depression    PHQ Scores 2/13/2020 2/8/2019 9/21/2018 3/14/2018 5/22/2017 5/22/2017 5/20/2016   PHQ2 Score 3 0 0 0 0 0 0   PHQ9 Score 8 0 0 0 0 0 0     Prior prediabetes  She is trying to lose weight, she is eating healthier  Prediabetes, worsening. We discussed low-carb diet and increase activity level, walking okay, no intense exercise until cardiac work-up is completed    Lab Results   Component Value Date    LABA1C 5.8 08/23/2019    LABA1C 5.5 01/11/2019    LABA1C 6.1 10/03/2018       Hyperlipidemia and high triglycerides:  Patient is  following a low fat, low cholesterol diet. She is not exercising regularly. OTC Supplements: None    LDL is high. High triglycerides  Lab Results   Component Value Date    LDLCHOLESTEROL 121 11/03/2018     Lab Results   Component Value Date    TRIG 156 (H) 11/03/2018    TRIG 246 (H) 05/22/2017    TRIG 185 (H) 04/06/2013           Current Outpatient Medications on File Prior to Visit   Medication Sig Dispense Refill    albuterol sulfate HFA (PROAIR HFA) 108 (90 Base) MCG/ACT inhaler Inhale 2 puffs into the lungs every 6 hours as needed for Wheezing or Shortness of Breath (cough) 8 g 3     No current facility-administered medications on file prior to visit. Social History     Tobacco Use    Smoking status: Never Smoker    Smokeless tobacco: Never Used   Substance Use Topics    Alcohol use:  Yes     Alcohol/week: 0.0 standard drinks Nose normal. No mucosal edema. Mouth/Throat:      Mouth: Mucous membranes are moist.      Pharynx: No posterior oropharyngeal erythema. Eyes:      General: Lids are normal. No scleral icterus. Right eye: No discharge. Left eye: No discharge. Conjunctiva/sclera: Conjunctivae normal.   Neck:      Musculoskeletal: Normal range of motion and neck supple. Thyroid: No thyromegaly. Cardiovascular:      Rate and Rhythm: Normal rate and regular rhythm. Occasional extrasystoles are present. Heart sounds: Normal heart sounds. No murmur. Pulmonary:      Effort: Pulmonary effort is normal. No respiratory distress. Breath sounds: Normal breath sounds. No wheezing or rales. Chest:      Chest wall: No tenderness. Abdominal:      General: Bowel sounds are normal. There is no distension. Palpations: Abdomen is soft. There is no hepatomegaly or splenomegaly. Tenderness: There is no abdominal tenderness. Comments: Obese abdomen   Musculoskeletal: Normal range of motion. General: No tenderness. Right lower leg: No edema. Left lower leg: No edema. Skin:     General: Skin is warm and dry. Capillary Refill: Capillary refill takes less than 2 seconds. Findings: No rash. Neurological:      Mental Status: She is alert and oriented to person, place, and time. Cranial Nerves: No cranial nerve deficit. Motor: No abnormal muscle tone. Psychiatric:         Mood and Affect: Mood is anxious. Behavior: Behavior normal.         Thought Content: Thought content normal.         Judgment: Judgment normal.             I personally reviewed testing with patient.   Hyperglycemia, A1c showed prediabetes  Hyperlipidemia  High triglycerides    Otherwise labs within normal limits    Lab Results   Component Value Date    WBC 10.6 08/14/2019    HGB 12.9 08/14/2019    HCT 39.4 08/14/2019    MCV 91.2 08/14/2019     08/14/2019       Lab Results work-up  - Holter Monitor 48 Hour; Future  - ECHO Complete 2D W Doppler W Color; Future  - Magnesium; Future  - TSH without Reflex; Future  - AFL([facility]) - Day Sanchez MD, Cardiology, [place]    4. Current mild episode of major depressive disorder without prior episode ([address]  worsening  We will do investigations for the heart  We will reevaluate at the next appointment  BuSpar as needed    5. Prediabetes  Worsening  Lab Results   Component Value Date    LABA1C 5.8 08/23/2019    LABA1C 5.5 01/11/2019    LABA1C 6.1 10/03/2018       - Hemoglobin A1C; Future  Low-carb diet, increase activity level as tolerated, walk more    6. Dyslipidemia (high LDL; low HDL)  Unsure if worsening or improving  Will recheck level  Low-carb diet, low-fat diet increase activity level as tolerated, walk more  - Lipid Panel; Future    7. Hot flashes  worsening  - Follicle Stimulating Hormone; Future    8. Abnormal EKG  Prior EKG in 2018 normal  - Holter Monitor 48 Hour; Future, most recent EKG from 8/14/2019 was abnormal  Needs further work-up  - ECHO Complete 2D W Doppler W Color;  Future  - AFL([facility]) - Day Sanchez MD, Cardiology, [facility]        Data Unavailable    Orders Placed This Encounter   Procedures    Follicle Stimulating Hormone     Standing Status:   Future     Standing Expiration Date:   2/13/2021    CBC     Standing Status:   Future     Standing Expiration Date:   2/13/2021    Comprehensive Metabolic Panel     Standing Status:   Future     Standing Expiration Date:   2/13/2021    Lipid Panel     Standing Status:   Future     Standing Expiration Date:   2/13/2021     Order Specific Question:   Is Patient Fasting?/# of Hours     Answer:   8-10 Hours, water ok to drink    Magnesium     Standing Status:   Future     Standing Expiration Date:   2/13/2021    TSH without Reflex     Standing Status:   Future     Standing Expiration Date:   2/13/2021    Hemoglobin A1C     Standing Status:   Future     Standing Expiration Thepatient's past medical, surgical, social, and family history as well as her   current medications and allergies were reviewed as documented in today's encounter. Medications, labs, diagnostic studies,consultations and follow-up as documented in this encounter. Return in about 3 months (around 5/13/2020) for LABS F/U, FATIGUE, WT management. Patient was seen with total face to face time of 25 minutes. More than 50% of this visit was counseling and education. Future Appointments   Date Time Provider Diamond Gonzalez   2/21/2020  1:00 PM MATT Perez CNP [facility] [address] [facility]   3/3/2020  8:30 AM STA ECHO RM 1 STAZ ECHO [facility]   3/3/2020 10:00 AM STA EKG RM ST[facility] EKG [facility]   3/13/2020 10:00 AM MATT Gunn CNP [facility]   5/22/2020 10:30 AM Gabrielle Vincent MD [facility]     This note was completed by using the assistance of a speech-recognition program. However, inadvertent computerized transcription errors may be present. Although every effort was made to ensure accuracy, no guarantees can be provided that every mistake has been identified and corrected by editing.     Electronically signed by Gabrielle Vincent MD on 2/13/2020 at 6:17 PM

## 2020-02-14 ENCOUNTER — PATIENT MESSAGE (OUTPATIENT)
Dept: FAMILY MEDICINE CLINIC | Age: 45
End: 2020-02-14

## 2020-02-14 ENCOUNTER — HOSPITAL ENCOUNTER (OUTPATIENT)
Age: 45
Setting detail: SPECIMEN
Discharge: HOME | End: 2020-02-14
Payer: COMMERCIAL

## 2020-02-14 PROBLEM — E83.42 HYPOMAGNESEMIA: Status: ACTIVE | Noted: 2020-02-14

## 2020-02-14 LAB
ALBUMIN SERPL-MCNC: 4.2 G/DL (ref 3.5–5.2)
ALBUMIN/GLOBULIN RATIO: 1.2 (ref 1–2.5)
ALP BLD-CCNC: 62 U/L (ref 35–104)
ALT SERPL-CCNC: 13 U/L (ref 5–33)
ANION GAP SERPL CALCULATED.3IONS-SCNC: 17 MMOL/L (ref 9–17)
AST SERPL-CCNC: 12 U/L
BILIRUB SERPL-MCNC: 0.38 MG/DL (ref 0.3–1.2)
BUN BLDV-MCNC: 7 MG/DL (ref 6–20)
BUN/CREAT BLD: NORMAL (ref 9–20)
CALCIUM SERPL-MCNC: 9.7 MG/DL (ref 8.6–10.4)
CHLORIDE BLD-SCNC: 99 MMOL/L (ref 98–107)
CHOLESTEROL/HDL RATIO: 5.4
CHOLESTEROL: 227 MG/DL
CO2: 23 MMOL/L (ref 20–31)
CREAT SERPL-MCNC: 0.72 MG/DL (ref 0.5–0.9)
ESTIMATED AVERAGE GLUCOSE: 126 MG/DL
FOLLICLE STIMULATING HORMONE: 7.1 U/L (ref 1.7–21.5)
GFR AFRICAN AMERICAN: >60 ML/MIN
GFR NON-AFRICAN AMERICAN: >60 ML/MIN
GFR SERPL CREATININE-BSD FRML MDRD: NORMAL ML/MIN/{1.73_M2}
GFR SERPL CREATININE-BSD FRML MDRD: NORMAL ML/MIN/{1.73_M2}
GLUCOSE BLD-MCNC: 94 MG/DL (ref 70–99)
HBA1C MFR BLD: 6 % (ref 4–6)
HCT VFR BLD CALC: 39.6 % (ref 36.3–47.1)
HDLC SERPL-MCNC: 42 MG/DL
HEMOGLOBIN: 12.7 G/DL (ref 11.9–15.1)
LDL CHOLESTEROL: 133 MG/DL (ref 0–130)
MAGNESIUM: 1.6 MG/DL (ref 1.6–2.6)
MCH RBC QN AUTO: 30.1 PG (ref 25.2–33.5)
MCHC RBC AUTO-ENTMCNC: 32.1 G/DL (ref 28.4–34.8)
MCV RBC AUTO: 93.8 FL (ref 82.6–102.9)
NRBC AUTOMATED: 0 PER 100 WBC
PDW BLD-RTO: 12.9 % (ref 11.8–14.4)
PLATELET # BLD: 298 K/UL (ref 138–453)
PMV BLD AUTO: 10.9 FL (ref 8.1–13.5)
POTASSIUM SERPL-SCNC: 4.1 MMOL/L (ref 3.7–5.3)
RBC # BLD: 4.22 M/UL (ref 3.95–5.11)
SODIUM BLD-SCNC: 139 MMOL/L (ref 135–144)
TOTAL PROTEIN: 7.7 G/DL (ref 6.4–8.3)
TRIGL SERPL-MCNC: 262 MG/DL
TSH SERPL DL<=0.05 MIU/L-ACNC: 1.47 MIU/L (ref 0.3–5)
VLDLC SERPL CALC-MCNC: ABNORMAL MG/DL (ref 1–30)
WBC # BLD: 5.3 K/UL (ref 3.5–11.3)

## 2020-02-14 PROCEDURE — 80053 COMPREHEN METABOLIC PANEL: CPT

## 2020-02-14 PROCEDURE — 36415 COLL VENOUS BLD VENIPUNCTURE: CPT

## 2020-02-14 PROCEDURE — 84443 ASSAY THYROID STIM HORMONE: CPT

## 2020-02-14 PROCEDURE — 83735 ASSAY OF MAGNESIUM: CPT

## 2020-02-14 PROCEDURE — 85027 COMPLETE CBC AUTOMATED: CPT

## 2020-02-14 PROCEDURE — 80061 LIPID PANEL: CPT

## 2020-02-14 PROCEDURE — 83036 HEMOGLOBIN GLYCOSYLATED A1C: CPT

## 2020-02-14 PROCEDURE — 83001 ASSAY OF GONADOTROPIN (FSH): CPT

## 2020-02-14 NOTE — RESULT ENCOUNTER NOTE
Tasia comment sent to patient      Normal FSH, not at menopause  Normal potassium  High lipids and triglycerides, low-carb, low-fat diet advised    Very low magnesium, 1.6, should be around 2. This can cause you to have palpitations. Start taking magnesium 400 mg daily.       Future Appointments  2/21/2020  1:00 PM    MATT Lemons CNP  [facility] [address] [facility]  3/3/2020   8:30 AM    STA ECHO RM 1              STAZ ECHO      [facility]  3/3/2020   10:00 AM   STA EKG RM                 STAZ EKG       [facility]  3/13/2020  10:00 AM   MATT Fraser CNP   [facility]   [facility]  5/22/2020  10:30 AM   [facility]     [facility]

## 2020-02-15 RX ORDER — FLUTICASONE PROPIONATE 50 MCG
2 SPRAY, SUSPENSION (ML) NASAL NIGHTLY
Qty: 1 BOTTLE | Refills: 3 | Status: SHIPPED | OUTPATIENT
Start: 2020-02-15 | End: 2020-09-24 | Stop reason: SDUPTHER

## 2020-02-15 RX ORDER — METFORMIN HYDROCHLORIDE 500 MG/1
500 TABLET, EXTENDED RELEASE ORAL
Qty: 30 TABLET | Refills: 3 | Status: SHIPPED | OUTPATIENT
Start: 2020-02-15 | End: 2020-04-20 | Stop reason: SDUPTHER

## 2020-02-15 NOTE — TELEPHONE ENCOUNTER
From: Beto Hilario MD  To: Neelima Foote  Sent: 2/14/2020 4:54 PM EST  Subject: Low magnesium, start taking magnesium 400 mg daily from over-the-counter    Crystal,    I released your labs. Normal FSH, not at menopause  Normal potassium  High lipids and triglycerides, low-carb, low-fat diet advised    Very low magnesium, 1.6, should be around 2. This can cause you to have palpitations. Start taking magnesium 400 mg daily. Take it with food, preferably in the morning or with dinner    If you have any questions, please let me know.     Beto Hilario MD  [facility] [address]  [address] [address]  Dept: [phone]  Dept Fax: [phone]

## 2020-02-15 NOTE — RESULT ENCOUNTER NOTE
Tasia comment sent to patient.   A1c 6, mildly worsening prediabetes, start metformin, sent to the pharmacy today  Also low-carb diet, low-fat diet    Future Appointments  2/21/2020  1:00 PM    MATT Austin CNP  [facility] [address] [facility]  3/3/2020   8:30 AM    STA ECHO RM 1              STAZ ECHO      [facility]  3/3/2020   10:00 AM   STA EKG RM                 STAZ EKG       [facility]  3/13/2020  10:00 AM   MATT Wallace CNP [facility]   [facility]  5/22/2020  10:30 AM   Nevin Soto MD     [facility]

## 2020-02-21 PROBLEM — Z82.49 FAMILY HISTORY OF PREMATURE CAD: Status: ACTIVE | Noted: 2020-02-21

## 2020-04-20 ENCOUNTER — PATIENT MESSAGE (OUTPATIENT)
Dept: FAMILY MEDICINE CLINIC | Age: 45
End: 2020-04-20

## 2020-04-20 ENCOUNTER — NURSE TRIAGE (OUTPATIENT)
Dept: OTHER | Facility: CLINIC | Age: 45
End: 2020-04-20

## 2020-04-20 RX ORDER — METFORMIN HYDROCHLORIDE 500 MG/1
500 TABLET, EXTENDED RELEASE ORAL
Qty: 30 TABLET | Refills: 3 | Status: SHIPPED | OUTPATIENT
Start: 2020-04-20 | End: 2020-05-22 | Stop reason: SDUPTHER

## 2020-04-20 RX ORDER — ALBUTEROL SULFATE 90 UG/1
2 AEROSOL, METERED RESPIRATORY (INHALATION) EVERY 6 HOURS PRN
Qty: 8 G | Refills: 3 | Status: SHIPPED | OUTPATIENT
Start: 2020-04-20 | End: 2021-09-30 | Stop reason: SDUPTHER

## 2020-04-21 RX ORDER — ERTUGLIFLOZIN 5 MG/1
5 TABLET, FILM COATED ORAL DAILY
Qty: 30 TABLET | Refills: 11 | Status: SHIPPED
Start: 2020-04-21 | End: 2020-05-07

## 2020-04-21 NOTE — TELEPHONE ENCOUNTER
From: Juan Manuel Cox  To: Dariel Cabrera MD  Sent: 4/20/2020 3:35 PM EDT  Subject: Prescription Question    Thank u. I believe I had a form of the [id number] and it has me winded still (had flu symptoms and fever 2days) Didn't get tested in fear of everything going on with media etc.   I am trying to get into a walking routine for weight management as well (I weigh 204-home scale). Is the extended better than what I had or is it the same for the metformin?      ----- Message -----   From:Lena Celaya MD   Sent:4/20/2020 3:30 PM EDT   To:Celia Hendricks   Subject:RE: Prescription Question    Celia,   I refilled your medications:  Orders Placed This Encounter   metFORMIN (GLUCOPHAGE-XR) 500 MG extended release tablet   Sig: Take 1 tablet by mouth daily (with breakfast)   Dispense: 30 tablet   Refill: 3   albuterol sulfate HFA (PROAIR HFA) 108 (90 Base) MCG/ACT inhaler   Sig: Inhale 2 puffs into the lungs every 6 hours as needed for Wheezing or Shortness of Breath (cough)   Dispense: 8 g   Refill: 3      Please pick them up from the pharmacy. [place] [address] [place] [address], [address] [place] [id number]  Phone: [phone] Fax: [phone]      If you have any questions, please let me know. You can call our office at [phone] and talk with a nurse or you can e-mail me via My Chart. Thank you! [facility] [address]  [address] [address]  Dept: [phone]  Dept Fax: [phone]      ----- Message -----   From:Celia Hendricks   Sent:4/20/2020 3:21 PM EDT   To:Lena Celaya MD   Subject:Prescription Question    Can I please get a refill on my asthma inhaler? And possible a new medicine to control pre diabetes ?

## 2020-05-06 ENCOUNTER — TELEPHONE (OUTPATIENT)
Dept: FAMILY MEDICINE CLINIC | Age: 45
End: 2020-05-06

## 2020-05-07 NOTE — TELEPHONE ENCOUNTER
Please do PA for steglatro    FYI  From  [employer] To  Kitty Ríos MD Sent  5/6/2020  8:26 PM   Hello.  I am still waiting for my ravindra to be approved by you       Previous Messages

## 2020-05-09 ENCOUNTER — PATIENT MESSAGE (OUTPATIENT)
Dept: FAMILY MEDICINE CLINIC | Age: 45
End: 2020-05-09

## 2020-05-13 ENCOUNTER — PATIENT MESSAGE (OUTPATIENT)
Dept: FAMILY MEDICINE CLINIC | Age: 45
End: 2020-05-13

## 2020-05-14 ENCOUNTER — PATIENT MESSAGE (OUTPATIENT)
Dept: FAMILY MEDICINE CLINIC | Age: 45
End: 2020-05-14

## 2020-05-14 RX ORDER — BUPROPION HYDROCHLORIDE 150 MG/1
150 TABLET ORAL EVERY MORNING
Qty: 30 TABLET | Refills: 3 | Status: SHIPPED
Start: 2020-05-14 | End: 2021-02-05

## 2020-05-14 NOTE — TELEPHONE ENCOUNTER
From: Melany Garrett  To: Karyle Rake, MD  Sent: 5/13/2020 3:28 PM EDT  Subject: Barabara Muta was denied    Okay thank you     And [facility], thanks again       ----- Message -----   Jocelynn Sales MD   Sent:5/13/2020 3:26 PM EDT   To:Crystal Concho Belt   Subject:RE: Mejiaglatro was denied    Crystal,     I sent medication for you to your pharmacy:    Orders Placed This Encounter   naltrexone-buPROPion (CONTRAVE) 8-90 MG per extended release tablet   Sig: Take 1 tablet by mouth 2 times daily   Dispense: 60 tablet   Refill: 0        [address] [address] [address] [facility] [address] [place] [address]  Phone: [phone] Fax: [phone]          If you have any questions, please let me know. Karyle Rake, MD      ----- Message -----   From:Crystal Concho Belt   Sent:5/13/2020 3:16 PM EDT   To:Lena Ramsay MD   Subject:RE: Quintonabara Muta was denied    Hi. I was looking into this weight management pill CONTRAVE and seen it could help w type 2 as well and aid in weight loss. Do u think that this is something that i can try out?      ----- Message -----   Jocelynn Sales MD   Sent:5/9/2020 4:34 PM EDT   To:Crystal Concho Belt   Subject:RE: Steglatro was denied    [address] are very welcomed! Karyle Rake, MD        ----- Message -----   From:Crystal Concho Belt   Sent:5/9/2020 3:08 PM EDT   To:Lena Ramsay MD   Subject:RE: Barabara Muta was denied    Thank you for your suggestions and it has been something I was looking into as well. I will call them Monday, thank u again.      ----- Message -----   Jocelynn Sales MD   Sent:5/9/2020 12:25 PM EDT   To:Crystal Cecilia Belt   Subject:RE: Steglatro was denied    Crystal,    I have a friend who bought herself a treadmill and does her home work on treadmill somehow.     I couldn't do that, but I started to get up and walk while reading the notes or typing in between and adds up to the steps on FitBit. I hope it helps       I made the referral    [address] [facility] [place], [address] [place]. 1 [facility] [place] [facility] 76.  [phone]    If you have any questions, please let me know. Kevin Jerry MD  [address] [facility] [address]  [address] [facility] [address] [facility] [address]  [address] [facility] [address]  Dept: [phone]  Dept Fax: [phone]      ----- Message -----   From:Crystal Lennard Ormond   Sent:5/9/2020 11:42 AM EDT   To:Lena Edge MD   Subject:Gayla was denied    Hello. My new script said it was denied. So my problem is, working at home, no movement really and watching my carbs and sugars. I know this and I try to fight this everyday and tell me self what I already know, more water, walks in neighborhood, no sweets, etc. At this point, I know I need medicine,but I dont want to really take medicine, so I realize I need to get serious about this and get motivated. Can you please refer me to a weight loss clinic (maybe that does ballon surgeries) or dietician or recommend something for me as well.      ----- Message -----   Neida Moise MD   Sent:5/7/2020 3:15 PM EDT    To:Celia Christianson was denied    Crystal,  We did prior authorization and it was denied  I did send another medication, please let me know, my chart, if this is not covered, then we will need to do another prior authorization    Orders Placed This Encounter   dapagliflozin (FARXIGA) 5 MG tablet   Sig: Take 1 tablet by mouth every morning   Dispense: 90 tablet   Refill: [address] [facility] [address] [facility], [address]  [address] [address]  Phone: [phone] Fax: [phone]          If you have any questions, please let me know.     Kevin Jerry MD  [address] [facility] [address]  [address] [facility] [address] [place] [address] [address]  Dept: [phone]  Dept Fax: [phone]

## 2020-05-16 RX ORDER — METRONIDAZOLE 500 MG/1
500 TABLET ORAL 2 TIMES DAILY
Qty: 14 TABLET | Refills: 0 | Status: SHIPPED | OUTPATIENT
Start: 2020-05-16 | End: 2020-05-22 | Stop reason: ALTCHOICE

## 2020-05-16 NOTE — TELEPHONE ENCOUNTER
From: [employer]  To: Kitty Ríos MD  Sent: 5/14/2020 5:26 PM EDT  Subject: Joselito Guillen, I know why my Insurance denied my scripts its because they said your medication has been denied as it does not appear to meet medical necessity necessity requirements the plan rules require parameters like diagnosis lab tests physical exam to me approval and they said that I did not have that on file and that it is not shown that it is type two diabetes      ----- Message -----   Fatmata Gamez MD   Sent:5/14/2020 11:58 AM EDT   To:Celia Zendejas   Subject:RE: Chino Guallpa,     I sent medication for you to your pharmacy: This is an antidepressant, antianxiety medication, helps with weight loss, helps with cravings, and it is also part of the Contrave which was not covered by the insurance    Start with 150 mg which is the lowest dosage and then next month if it does not give her any side effects we can increase it to 300 mg. It does help people lose weight    Orders Placed This Encounter   buPROPion (WELLBUTRIN XL) 150 MG extended release tablet   Sig: Take 1 tablet by mouth every morning   Dispense: 30 tablet   Refill: [address] [address]  Phone: [phone] Fax: [phone]      If you have any questions, please let me know. Kitty Ríos MD      ----- Message -----   From:Celia Zendejas   Sent:5/14/2020 11:27 AM EDT   To:Lena Field MD   Subject:RE: Jani Osullivan    No thank you .      ----- Message -----   From:Lena Field MD   Sent:5/14/2020 7:47 AM EDT   To:Celia Stephens   Subject:rebecca Yang,  I could send Wellbutrin instead if you want    If you have any questions, please let me know.     Kitty Ríos MD  [address] [facility] [address]  [address] [address]  Dept: [phone]  Dept Fax:

## 2020-05-21 VITALS — WEIGHT: 208 LBS | HEIGHT: 61 IN | BODY MASS INDEX: 39.27 KG/M2

## 2020-05-21 ASSESSMENT — PATIENT HEALTH QUESTIONNAIRE - PHQ9
SUM OF ALL RESPONSES TO PHQ QUESTIONS 1-9: 0
SUM OF ALL RESPONSES TO PHQ9 QUESTIONS 1 & 2: 0
1. LITTLE INTEREST OR PLEASURE IN DOING THINGS: 0
2. FEELING DOWN, DEPRESSED OR HOPELESS: 0
SUM OF ALL RESPONSES TO PHQ QUESTIONS 1-9: 0

## 2020-05-22 ENCOUNTER — TELEMEDICINE (OUTPATIENT)
Dept: FAMILY MEDICINE CLINIC | Age: 45
End: 2020-05-22
Payer: COMMERCIAL

## 2020-05-22 PROCEDURE — 99214 OFFICE O/P EST MOD 30 MIN: CPT | Performed by: FAMILY MEDICINE

## 2020-05-22 RX ORDER — METFORMIN HYDROCHLORIDE 500 MG/1
1000 TABLET, EXTENDED RELEASE ORAL
Qty: 180 TABLET | Refills: 3 | Status: SHIPPED | OUTPATIENT
Start: 2020-05-22 | End: 2021-08-05

## 2020-05-22 ASSESSMENT — ENCOUNTER SYMPTOMS
CONSTIPATION: 0
ABDOMINAL DISTENTION: 0
ABDOMINAL PAIN: 0
DIARRHEA: 0
NAUSEA: 0
CHEST TIGHTNESS: 0
WHEEZING: 0
COUGH: 0
VOMITING: 0
SHORTNESS OF BREATH: 0

## 2020-05-22 NOTE — PROGRESS NOTES
2020    TELEHEALTH EVALUATION -- Audio/Visual (During HPKNY-88 public health emergency)    HPI:    Yan Otto (:  1975) has requested an audio/video evaluation for the following concern(s):Palpitations (SHE DID SEE THE CARDIOLOGIST [facility] ); Hyperglycemia (PRE DM ); and Weight Management ([address] ? IF SO SHE WILL NEED SAMPLES )    Patient reports she still has palpitations   Taking magnesium, tolerated well. She thinks is helping. She has noticed caffeine makes her have palpitations . She denies having chest pain, lightheadedness, shortness of breath when having palpitations. She thinks the anxiety might give her palpitations too. Patient says her heart rate goes up to 168 when exercising on her I-watch  She did have evaluation by cardiology, all the work-up was negative      Magi [address]iver 20 was within normal limits, EF 55%, RVSP normal, 23 mmHg    Holter monitor was within normal limits on 3/6/2020, main rhythm was sinus rhythm and sinus tachycardia which is normal    3/5/2020-stress test was negative        Prediabetes is worsening  Patient says she has been doing workouts. Patient denies increased appetite or thirst.  Has been taking metformin once a day. Started Wellbutrin a few days ago, denies side effects, tolerated well. She does not think it is helping her appetite yet. Patient reports she has been unable to lose weight despite her lifestyle changes. She has multiple comorbidities: Anxiety, depression, hyperglycemia, asthma, dyslipidemia. I did refer her to Weight management, and she does have an appointment. She has gained 4 pounds in 2 months  Wt Readings from Last 3 Encounters:   20 208 lb (94.3 kg)   20 209 lb (94.8 kg)   20 204 lb (92.5 kg)     Severe obesity per BMI worsening. Body mass index is 39.3 kg/m².     A1c worsening  Lab Results   Component Value Date    LABA1C 6.0 2020    LABA1C Take 1 tablet by mouth daily (with breakfast) Yes Lesli Perez MD   albuterol sulfate HFA (PROAIR HFA) 108 (90 Base) MCG/ACT inhaler Inhale 2 puffs into the lungs every 6 hours as needed for Wheezing or Shortness of Breath (cough) Yes Lesli Perez MD   fluticasone (FLONASE) 50 MCG/ACT nasal spray 2 sprays by Nasal route nightly Yes Lesli Perez MD   metroNIDAZOLE (FLAGYL) 500 MG tablet Take 1 tablet by mouth 2 times daily for 7 days NO ALCOHOL WHILE TAKING IT  Patient not taking: Reported on 5/21/2020  Lesli Perez MD   dapagliflozin (FARXIGA) 5 MG tablet Take 1 tablet by mouth every morning  Patient not taking: Reported on 5/21/2020 NOT covered   Lesli Perez MD   busPIRone (BUSPAR) 7.5 MG tablet Take 7.5 mg by mouth 2 times daily as needed for Anxiety Prescribed as BID, but patient takes PRN BID  Historical Provider, MD       Social History     Tobacco Use    Smoking status: Never Smoker    Smokeless tobacco: Never Used   Substance Use Topics    Alcohol use: Yes     Alcohol/week: 0.0 standard drinks     Comment: Socially    Drug use: No          PHYSICAL EXAMINATION:    Vital Signs: (As obtained by patient/caregiver or practitioner observation)  -vital signs stable and within normal limits except severe obesity per BMI      Ht 5' 1\" (1.549 m)   Wt 208 lb (94.3 kg)   BMI 39.30 kg/m²     Intensity of pain is 0 out of 10    Constitutional: [x] Appears well-developed and well-nourished [x] No apparent distress      [x] Abnormal- obese      Mental status  [x] Alert and awake  [x] Oriented to person/place/time [x]Able to follow commands      Eyes:  EOM    [x]  Normal  [] Abnormal-  Sclera  [x]  Normal  [] Abnormal -         Discharge [x]  None visible  [] Abnormal -    HENT:   [x] Normocephalic, atraumatic.   [] Abnormal   [x] Mouth/Throat: Mucous membranes are moist.     External Ears [x] Normal  [] Abnormal-     Neck: [x] No visualized mass     Pulmonary/Chest: [x] Respiratory

## 2020-05-23 ENCOUNTER — TELEPHONE (OUTPATIENT)
Dept: FAMILY MEDICINE CLINIC | Age: 45
End: 2020-05-23

## 2020-05-23 PROBLEM — R55 PRE-SYNCOPE: Status: RESOLVED | Noted: 2020-02-13 | Resolved: 2020-05-23

## 2020-05-23 PROBLEM — E83.42 HYPOMAGNESEMIA: Status: RESOLVED | Noted: 2020-02-14 | Resolved: 2020-05-23

## 2020-05-23 PROBLEM — E66.01 SEVERE OBESITY (BMI 35.0-35.9 WITH COMORBIDITY) (HCC): Status: ACTIVE | Noted: 2017-05-22

## 2020-05-23 PROBLEM — R03.0 ELEVATED BLOOD PRESSURE READING: Status: RESOLVED | Noted: 2018-07-02 | Resolved: 2020-05-23

## 2020-05-23 NOTE — PATIENT INSTRUCTIONS
Patient Education        Learning About Acute Pancreatitis  What is acute pancreatitis? The pancreas is an organ behind the stomach. It makes hormones like insulin that help control how your body uses sugar. It also makes enzymes that help you digest food. Usually these enzymes flow from the pancreas to the intestines. But if they leak into the pancreas, they can irritate it and cause pain and swelling. When this happens suddenly, it's called acute pancreatitis. What causes it? Most of the time, acute pancreatitis is caused by gallstones or by heavy alcohol use. But several other things can cause it, such as:  · High levels of fats in the blood. · An injury. · A problem after a surgery or a procedure. · Certain medicines. What are the symptoms? The main symptom is pain in the upper belly. The pain can be severe. You also may have a fever, nausea, or vomiting. Some people get so sick that they have problems breathing. They also may have low blood pressure. How is it diagnosed? Your doctor will diagnose pancreatitis with an exam and by looking at your lab tests. Your doctor may think that you have this problem based on your symptoms and where you have pain in your belly. You may have blood tests of enzymes called amylase and lipase. In pancreatitis, the level of these enzymes is usually much higher than normal.  You also may have imaging tests of the belly. These may include an ultrasound, a CT scan, or an MRI. A special MRI called MRCP can show images of the bile ducts. This test can be very helpful when gallstones are causing the problem. How is it treated? Treatment of acute pancreatitis usually takes place in the hospital. It focuses on taking care of pain and supporting your body while your pancreas heals. In severe cases, treatment may occur in an intensive care unit to support breathing, treat serious infections, or help raise very low blood pressure.   If a gallstone is causing the problem, the and safety. Be sure to make and go to all appointments, and call your doctor if you are having problems. It's also a good idea to know your test results and keep a list of the medicines you take. How can you care for yourself at home? · Take an over-the-counter pain medicine, such as acetaminophen (Tylenol), ibuprofen (Advil, Motrin), or naproxen (Aleve). Read and follow all instructions on the label. · If the doctor prescribed antibiotics, take them as directed. Do not stop taking them just because you feel better. You need to take the full course of antibiotics. · Be careful when taking over-the-counter cold or flu medicines and Tylenol at the same time. Many of these medicines have acetaminophen, which is Tylenol. Read the labels to make sure that you are not taking more than the recommended dose. Too much acetaminophen (Tylenol) can be harmful. · Breathe warm, moist air from a steamy shower, a hot bath, or a sink filled with hot water. Avoid cold, dry air. Using a humidifier in your home may help. Follow the directions for cleaning the machine. · Use saline (saltwater) nasal washes to help keep your nasal passages open and wash out mucus and bacteria. You can buy saline nose drops at a grocery store or drugstore. Or you can make your own at home by adding 1 teaspoon of salt and 1 teaspoon of baking soda to 2 cups of distilled water. If you make your own, fill a bulb syringe with the solution, insert the tip into your nostril, and squeeze gently. Jagruti Vanna your nose. · Put a hot, wet towel or a warm gel pack on your face 3 or 4 times a day for 5 to 10 minutes each time. · Try a decongestant nasal spray like oxymetazoline (Afrin). Do not use it for more than 3 days in a row. Using it for more than 3 days can make your congestion worse. When should you call for help?   Call your doctor now or seek immediate medical care if:    · You have new or worse swelling or redness in your face or around your eyes.

## 2020-06-29 ENCOUNTER — TELEMEDICINE (OUTPATIENT)
Dept: FAMILY MEDICINE CLINIC | Age: 45
End: 2020-06-29
Payer: COMMERCIAL

## 2020-06-29 PROBLEM — Z86.018 HISTORY OF UTERINE FIBROID: Status: ACTIVE | Noted: 2020-06-29

## 2020-06-29 PROCEDURE — 99213 OFFICE O/P EST LOW 20 MIN: CPT | Performed by: FAMILY MEDICINE

## 2020-06-29 ASSESSMENT — ENCOUNTER SYMPTOMS
RESPIRATORY NEGATIVE: 1
NAUSEA: 0
SHORTNESS OF BREATH: 0
CONSTIPATION: 0
DIARRHEA: 0
COUGH: 0
ABDOMINAL PAIN: 1
BLOOD IN STOOL: 0

## 2020-06-29 NOTE — PROGRESS NOTES
2/13/2020    Vision abnormalities     glasses/contacts     Past Surgical History:   Procedure Laterality Date    COLONOSCOPY      COLONOSCOPY  06/28/2019    COLONOSCOPY N/A 6/28/2019    COLORECTAL CANCER SCREENING, NOT HIGH RISK performed by Katarina Callahan MD at [address] [place] [address]      LITHOTRIPSY      TUBAL LIGATION      TUBAL LIGATION      WISDOM TOOTH EXTRACTION       Family History   Problem Relation Age of Onset    Diabetes Father         possible type 1    Heart Attack Father 35    Bipolar Disorder Sister     Lung Cancer Paternal Grandmother     Heart Attack Paternal Grandfather      Current Outpatient Medications   Medication Sig Dispense Refill    metFORMIN (GLUCOPHAGE-XR) 500 MG extended release tablet Take 2 tablets by mouth daily (with breakfast) 180 tablet 3    Insulin Pen Needle 31G X 5 MM MISC 1 each by Does not apply route daily TO BE USED with VICTOZA, DAILY 100 each 3    Liraglutide (VICTOZA) 18 MG/3ML SOPN SC injection Inject 1.2 mg into the skin daily 3 pen 0    Magnesium 400 MG CAPS Take by mouth      buPROPion (WELLBUTRIN XL) 150 MG extended release tablet Take 1 tablet by mouth every morning 30 tablet 3    albuterol sulfate HFA (PROAIR HFA) 108 (90 Base) MCG/ACT inhaler Inhale 2 puffs into the lungs every 6 hours as needed for Wheezing or Shortness of Breath (cough) 8 g 3    busPIRone (BUSPAR) 7.5 MG tablet Take 7.5 mg by mouth 2 times daily as needed for Anxiety Prescribed as BID, but patient takes PRN BID      fluticasone (FLONASE) 50 MCG/ACT nasal spray 2 sprays by Nasal route nightly 1 Bottle 3     No current facility-administered medications for this visit. No Known Allergies     Prior to Visit Medications    Medication Sig Taking?  Authorizing Provider   metFORMIN (GLUCOPHAGE-XR) 500 MG extended release tablet Take 2 tablets by mouth daily (with breakfast) Yes Rosina Hawkins MD   Insulin Pen Needle 31G X 5 MM MISC 1 each by Does not apply route daily TO BE USED with VICTOZA, DAILY Yes Silvino Peck MD   Liraglutide (VICTOZA) 18 MG/3ML SOPN SC injection Inject 1.2 mg into the skin daily Yes Silvino Peck MD   Magnesium 400 MG CAPS Take by mouth Yes Historical Provider, MD   buPROPion (WELLBUTRIN XL) 150 MG extended release tablet Take 1 tablet by mouth every morning Yes Silvino Peck MD   albuterol sulfate HFA (PROAIR HFA) 108 (90 Base) MCG/ACT inhaler Inhale 2 puffs into the lungs every 6 hours as needed for Wheezing or Shortness of Breath (cough) Yes Silvino Peck MD   busPIRone (BUSPAR) 7.5 MG tablet Take 7.5 mg by mouth 2 times daily as needed for Anxiety Prescribed as BID, but patient takes PRN BID Yes Historical Provider, MD   fluticasone (FLONASE) 50 MCG/ACT nasal spray 2 sprays by Nasal route nightly Yes Silvino Peck MD       Social History     Tobacco Use    Smoking status: Never Smoker    Smokeless tobacco: Never Used   Substance Use Topics    Alcohol use: Yes     Alcohol/week: 0.0 standard drinks     Comment: Socially    Drug use: No        PHYSICAL EXAMINATION:  Vital Signs: (As obtained by patient/caregiver or practitioner observation)    Constitutional: [x] Appears well-developed and well-nourished [x] No apparent distress      [x] Abnormal- obese    Mental status  [x] Alert and awake  [x] Oriented to person/place/time [x]Able to follow commands      Eyes:  EOM    [x]  Normal  [] Abnormal-  Sclera  [x]  Normal  [] Abnormal -         Discharge [x]  None visible  [] Abnormal -    HENT:   [x] Normocephalic, atraumatic.   [] Abnormal   [x] Mouth/Throat: Mucous membranes are moist.     External Ears [x] Normal  [] Abnormal-     Neck: [x] No visualized mass     Pulmonary/Chest: [x] Respiratory effort normal.  [x] No visualized signs of difficulty breathing or respiratory distress        [] Abnormal     Musculoskeletal:   [x] Normal gait with no signs of ataxia         [x] Normal range of motion of neck        [] Abnormal-     Neurological:        [x] No Facial Asymmetry (Cranial nerve 7 motor function) (limited exam to video visit)          [x] No gaze palsy        [] Abnormal-     Skin:        [x] No significant exanthematous lesions or discoloration noted on facial skin         [] Abnormal-     Psychiatric:       [x] Normal Affect [x] No Hallucinations        [x] Abnormal- Anxious, with pressured speech    Other pertinent observable physical exam findings-   Lab Results   Component Value Date    WBC 5.3 02/14/2020    HGB 12.7 02/14/2020    HCT 39.6 02/14/2020    MCV 93.8 02/14/2020     02/14/2020     Lab Results   Component Value Date     02/14/2020    K 4.1 02/14/2020    CL 99 02/14/2020    CO2 23 02/14/2020    BUN 7 02/14/2020    CREATININE 0.72 02/14/2020    GLUCOSE 94 02/14/2020    GLUCOSE 90 12/16/2011    CALCIUM 9.7 02/14/2020      Lab Results   Component Value Date    ALT 13 02/14/2020    AST 12 02/14/2020    ALKPHOS 62 02/14/2020    BILITOT 0.38 02/14/2020     Lab Results   Component Value Date    TSH 1.47 02/14/2020     Lab Results   Component Value Date    CHOL 227 (H) 02/14/2020    CHOL 194 11/03/2018    CHOL 226 (H) 05/22/2017     Lab Results   Component Value Date    TRIG 262 (H) 02/14/2020    TRIG 156 (H) 11/03/2018    TRIG 246 (H) 05/22/2017     Lab Results   Component Value Date    HDL 42 02/14/2020    HDL 42 11/03/2018    HDL 49 05/22/2017     Lab Results   Component Value Date    LDLCHOLESTEROL 133 (H) 02/14/2020    LDLCHOLESTEROL 121 11/03/2018    LDLCHOLESTEROL 128 05/22/2017     Lab Results   Component Value Date    VLDL NOT REPORTED (H) 02/14/2020    VLDL NOT REPORTED 11/03/2018    VLDL NOT REPORTED 05/22/2017     Lab Results   Component Value Date    CHOLHDLRATIO 5.4 (H) 02/14/2020    CHOLHDLRATIO 4.6 11/03/2018    CHOLHDLRATIO 4.6 05/22/2017     Lab Results   Component Value Date    LABA1C 6.0 02/14/2020       Lab Results   Component Value Date    VITD25 34.0 corrected by editing.   Electronically signed by MATT Motta CNP on 6/29/20 at 4:01 PM EDT

## 2020-06-29 NOTE — PATIENT INSTRUCTIONS
please click on the \"Sign Up Now\" link. Current as of: November 8, 2019               Content Version: 12.5  © 4787-6893 [employer]. Care instructions adapted under license by [facility] ([facility]). If you have questions about a medical condition or this instruction, always ask your healthcare professional. [address] any warranty or liability for your use of this information.

## 2020-07-10 ENCOUNTER — HOSPITAL ENCOUNTER (OUTPATIENT)
Dept: ULTRASOUND IMAGING | Age: 45
Discharge: HOME | End: 2020-07-12
Payer: COMMERCIAL

## 2020-07-10 ENCOUNTER — HOSPITAL ENCOUNTER (OUTPATIENT)
Age: 45
Setting detail: SPECIMEN
Discharge: HOME | End: 2020-07-10
Payer: COMMERCIAL

## 2020-07-10 ENCOUNTER — OFFICE VISIT (OUTPATIENT)
Dept: OBGYN CLINIC | Age: 45
End: 2020-07-10
Payer: COMMERCIAL

## 2020-07-10 VITALS
BODY MASS INDEX: 39.27 KG/M2 | TEMPERATURE: 98 F | WEIGHT: 208 LBS | HEIGHT: 61 IN | DIASTOLIC BLOOD PRESSURE: 88 MMHG | SYSTOLIC BLOOD PRESSURE: 120 MMHG

## 2020-07-10 PROCEDURE — G0145 SCR C/V CYTO,THINLAYER,RESCR: HCPCS

## 2020-07-10 PROCEDURE — 87624 HPV HI-RISK TYP POOLED RSLT: CPT

## 2020-07-10 PROCEDURE — 99396 PREV VISIT EST AGE 40-64: CPT | Performed by: NURSE PRACTITIONER

## 2020-07-10 ASSESSMENT — PATIENT HEALTH QUESTIONNAIRE - PHQ9
1. LITTLE INTEREST OR PLEASURE IN DOING THINGS: 0
2. FEELING DOWN, DEPRESSED OR HOPELESS: 0
SUM OF ALL RESPONSES TO PHQ9 QUESTIONS 1 & 2: 0
SUM OF ALL RESPONSES TO PHQ QUESTIONS 1-9: 0
SUM OF ALL RESPONSES TO PHQ QUESTIONS 1-9: 0

## 2020-07-10 NOTE — PROGRESS NOTES
History and Physical  [address], [address] [facility] [address], [address] [facility] [address] [phone]   Fax [phone]  Bhavani Josue  7/10/2020              54 y.o. Chief Complaint   Patient presents with    Gynecologic Exam       No LMP recorded. Patient has had an ablation. Primary Care Physician: Gene Comer MD    The patient was seen and examined. She has no chief complaint today and is here for her annual exam.  Her bowels are regular. There are no voiding complaints. She denies any bloating. She denies vaginal discharge and was counseled on STD's and the need for barrier contraception. HPI : Bhavani Josue is a 54 y.o. female [id number]    Annual exam  Hx of endometrial ablation- still has cramping and irregular light bleeding 1-2 days long. Complaining of left breast pain- starts on lateral side and shoots to nipple. Not associated with menses and happening for past couple months. Denies family hx of breast cancer. Denies nipple discharge or bleeding.  ________________________________________________________________________  OB History    Para Term  AB Living   5 4 4 0 1 4   SAB TAB Ectopic Molar Multiple Live Births   0 1 0 0 0 4      # Outcome Date GA Lbr Robert/2nd Weight Sex Delivery Anes PTL Lv   5 TAB        N    4 Term      Vag-Spont  N MONTY   3 Term      Vag-Spont  N MONTY   2 Term      Vag-Spont  N MONTY   1 Term      Vag-Spont  N MONTY     Past Medical History:   Diagnosis Date    Anxiety 2019    Asthma     Current mild episode of major depressive disorder without prior episode ([address] 2020    Dyslipidemia (high LDL; low HDL) 2017    History of endometrial ablation     History of kidney stones 2017    History of tubal ligation     Hyperglycemia 2017    Hypertriglyceridemia 2017    Hypomagnesemia 2020    Obesity (BMI 30-39. 9) 2017    Pre-syncope 2020    Vision abnormalities glasses/contacts                                                                   Past Surgical History:   Procedure Laterality Date    COLONOSCOPY      COLONOSCOPY  06/28/2019    COLONOSCOPY N/A 6/28/2019    COLORECTAL CANCER SCREENING, NOT HIGH RISK performed by Tavon Araiza MD at [address] [place] [address] ([facility]) LITHOTRIPSY      TUBAL LIGATION      TUBAL LIGATION      WISDOM TOOTH EXTRACTION       Family History   Problem Relation Age of Onset    Diabetes Father         possible type 1    Heart Attack Father 35    Bipolar Disorder Sister     Lung Cancer Paternal Grandmother     Heart Attack Paternal Grandfather      Social History     Socioeconomic History    Marital status: Single     Spouse name: Not on file    Number of children: Not on file    Years of education: Not on file    Highest education level: Not on file   Occupational History    Not on file   Social Needs    Financial resource strain: Not on file    Food insecurity     Worry: Not on file     Inability: Not on file    Transportation needs     Medical: Not on file     Non-medical: Not on file   Tobacco Use    Smoking status: Never Smoker    Smokeless tobacco: Never Used   Substance and Sexual Activity    Alcohol use:  Yes     Alcohol/week: 0.0 standard drinks     Comment: Socially    Drug use: No    Sexual activity: Yes     Partners: Male     Birth control/protection: Surgical     Comment: TL   Lifestyle    Physical activity     Days per week: Not on file     Minutes per session: Not on file    Stress: Not on file   Relationships    Social connections     Talks on phone: Not on file     Gets together: Not on file     Attends [employer] service: Not on file     Active member of club or organization: Not on file     Attends meetings of clubs or organizations: Not on file     Relationship status: Not on file    Intimate partner violence     Fear of current or ex partner: Not on file Constitutional:  Vitals:    07/10/20 1014   BP: 120/88   Site: Right Upper Arm   Position: Sitting   Cuff Size: Medium Adult   Temp: 98 °F (36.7 °C)   Weight: 208 lb (94.3 kg)   Height: 5' 1\" (1.549 m)         General Appearance: This  is a well Developed, well Nourished, well groomed female. Her BMI was reviewed. Nutritional decision making was discussed. Skin:  There was a Normal Inspection of the skin without rashes or lesions. There were no rashes. (Papular, Maculopapular, Hives, Pustular, Macular)     There were no lesions (Ulcers, Erythema, Abn. Appearing Nevi)            Lymphatic:  No Lymph Nodes were Palpable in the neck , axilla or groin.  0 # Of Lymph Nodes; Location ; Character [Normal]  [Shotty] [Tender] [Enlarged]     Neck and EENT:  The neck was supple. There were no masses   The thyroid was not enlarged and had no masses. Perrla, EOMI B/L, TMI B/L No Abnormalities. Throat inspected-No exudates or Masses, Nares Patent No Masses        Respiratory: The lungs were auscultated and found to be clear. There were no rales, rhonchi or wheezes. There was a good respiratory effort. Cardiovascular: The heart was in a regular rate and rhythm. . No S3 or S4. There was no murmur appreciated. Location, grade, and radiation are not applicable. Extremities: The patients extremities were without calf tenderness, edema, or varicosities. There was full range of motion in all four extremities. Pulses in all four extremities were appreciated and are 2/4. Abdomen: The abdomen was soft and non-tender. There were good bowel sounds in all quadrants and there was no guarding, rebound or rigidity. On evaluation there was no evidence of hepatosplenomegaly and there was no costal vertebral valerie tenderness bilaterally. No hernias were appreciated. Abdominal Scars: intact    Psych:   The patient had a normal Orientation to: Time, Place, Person, and Situation  There is no Mood / Affect changes    Breast:  (Chest)  normal appearance, no masses or tenderness  Self breast exams were reviewed in detail. Literature was given. Pelvic Exam:  Vulva and vagina appear normal. Bimanual exam reveals normal uterus and adnexa. Rectal Exam:  exam declined by patient          Musculosk:  Normal Gait and station was noted. Digits were evaluated without abnormal findings. Range of motion, stability and strength were evaluated and found to be appropriate for the patients age. ASSESSMENT:      54 y.o. Annual   Diagnosis Orders   1. Visit for gynecologic examination  PAP SMEAR   2. Encounter for screening mammogram for malignant neoplasm of breast  BOYD DIGITAL DIAGNOSTIC W OR WO CAD BILATERAL   3. Breast pain, left  BOYD DIGITAL DIAGNOSTIC W OR WO CAD BILATERAL          Chief Complaint   Patient presents with    Gynecologic Exam          Past Medical History:   Diagnosis Date    Anxiety 9/20/2019    Asthma     Current mild episode of major depressive disorder without prior episode ([address] 2/13/2020    Dyslipidemia (high LDL; low HDL) 5/22/2017    History of endometrial ablation     History of kidney stones 5/22/2017    History of tubal ligation     Hyperglycemia 5/22/2017    Hypertriglyceridemia 5/22/2017    Hypomagnesemia 2/14/2020    Obesity (BMI 30-39. 9) 5/22/2017    Pre-syncope 2/13/2020    Vision abnormalities     glasses/contacts         Patient Active Problem List    Diagnosis Date Noted    History of uterine fibroid 06/29/2020    Family history of premature CAD 02/21/2020    Heart palpitations 02/13/2020    Chronic fatigue 02/13/2020    Hot flashes 02/13/2020    Current mild episode of major depressive disorder without prior episode ([address] 02/13/2020    Abnormal EKG 02/13/2020    Unable to lose weight 09/20/2019    Anxiety 09/20/2019    Elevated BP without diagnosis of hypertension 09/20/2019    History of colon polyps 03/10/2019    Slow transit constipation 03/10/2019    Asthma, exercise induced 10/19/2018    Snoring 07/02/2018    History of endometrial ablation 05/22/2017 2001      Dyslipidemia (high LDL; low HDL) 05/22/2017    Hypertriglyceridemia 05/22/2017    Hyperglycemia 05/22/2017    Prediabetes 05/22/2017    Severe obesity (BMI 35.0-35.9 with comorbidity) ([facility] [address] 05/22/2017    History of kidney stones 05/22/2017    Herpes simplex type 1 infection 05/23/2016 5/23/2016 + herpes simplex type I IgG antibody      History of tubal ligation     Vision abnormalities      glasses/contacts            Hereditary Breast, Ovarian, Colon and Uterine Cancer screening Done. Tobacco & Secondary smoke risks reviewed; instructed on cessation and avoidance      Counseling Completed:  Preventative Health Recommendations and Follow up. The patient was informed of the recommended preventative health recommendations. 1. Annuals every year; Cytology collections per prevailing guidelines. 2. Mammograms begin every year at 35 yo if no abnormalities are found and no family history. 3. Bone density studies every 2-3 years. Begin at 71 yo. If no fracture history or osteoporosis family history. (significant). 4. Colonoscopy begin at 38 yo. Repeat every ten years if negative and no family history. 5. Calcium of 3705-6351 mg/day in split dosing  6. Vitamin D 400-800 IU/day  7. All other preventative health recommendations will be managed by the patients Primary care physician. PLAN:  Return in about 1 year (around 7/10/2021) for annual.   Pap smear obtained. Diagnostic mammogram ordered. Repeat Annual every 1 year  Cervical Cytology Evaluation begins at 24years old. If Negative Cytology, Follow-up screening per current guidelines. Mammograms every 1 year. If 35 yo and last mammogram was negative. Calcium and Vitamin D dosing reviewed. Colonoscopy screening reviewed as well as onset for bone density testing.   Birth control and barrier recommendations discussed. STD counseling and prevention reviewed. Gardisil counseling completed for all patients 10-37 yo. Routine health maintenance per patients PCP. Orders Placed This Encounter   Procedures    BOYD DIGITAL DIAGNOSTIC W OR WO CAD BILATERAL     Standing Status:   Future     Standing Expiration Date:   9/10/2021     Order Specific Question:   Reason for exam:     Answer:   left breast pain    PAP SMEAR     Patient History:    No LMP recorded. Patient has had an ablation. OBGYN Status: Ablation  Past Surgical History:  No date: COLONOSCOPY  06/28/2019: COLONOSCOPY  6/28/2019: COLONOSCOPY; N/A      Comment:  COLORECTAL CANCER SCREENING, NOT HIGH RISK performed by                Shaila De Paz MD at [address] [place] [address]  No date: ENDOMETRIAL ABLATION  No date: ENDOMETRIAL ABLATION  No date: LITHOTRIPSY  No date: TUBAL LIGATION  No date: TUBAL LIGATION  No date: WISDOM TOOTH EXTRACTION      Social History    Tobacco Use      Smoking status: Never Smoker      Smokeless tobacco: Never Used       Standing Status:   Future     Standing Expiration Date:   7/10/2021     Order Specific Question:   Collection Type     Answer: Thin Prep     Order Specific Question:   Prior Abnormal Pap Test     Answer:   No     Order Specific Question:   Screening or Diagnostic     Answer:   Screening     Order Specific Question:   HPV Requested?      Answer:   Yes     Order Specific Question:   High Risk Patient     Answer:   N/A

## 2020-07-14 LAB
HPV SAMPLE: NORMAL
HPV, GENOTYPE 16: NOT DETECTED
HPV, GENOTYPE 18: NOT DETECTED
HPV, HIGH RISK OTHER: NOT DETECTED
HPV, INTERPRETATION: NORMAL
SPECIMEN DESCRIPTION: NORMAL

## 2020-07-15 LAB — CYTOLOGY REPORT: NORMAL

## 2020-07-21 ENCOUNTER — PATIENT MESSAGE (OUTPATIENT)
Dept: FAMILY MEDICINE CLINIC | Age: 45
End: 2020-07-21

## 2020-07-21 NOTE — TELEPHONE ENCOUNTER
I called the mother and the mother says [place] daughter is upset\" and she said that I said that \"I have only 15 minutes\" for her appointment. I explained to [address] [place] [address] that the appointments time are always 15 minutes, and she did not show the FMLA to my nurse, I have to ask what with the papers in her hands and she did not inform my MA that she has an FMLA to fill out. I explained to the mother that I started to fill the FMLA while still talking with her and then she got upset when she left. On her behalf, the mom wants CXR, PFTs, pulmonology, FMLA  I told her FMLA is done, for 1 month, but the mom requested 3 months.   I told her I will addend the FMLA of her daughter and she will come and pick it up

## 2020-07-21 NOTE — TELEPHONE ENCOUNTER
From: Tiffani Cook  To: Mirlande Baker MD  Sent: 7/21/2020 2:53 PM EDT  Subject: Non-Urgent Medical Question    Please call me , I would like to discuss a matter of a visit today with my daughter. She stated somethings and I would like to talk to you about them.   [id number]. Thank you

## 2020-07-30 ENCOUNTER — HOSPITAL ENCOUNTER (OUTPATIENT)
Dept: ULTRASOUND IMAGING | Age: 45
Discharge: HOME | End: 2020-08-01
Payer: COMMERCIAL

## 2020-07-30 ENCOUNTER — HOSPITAL ENCOUNTER (OUTPATIENT)
Dept: MAMMOGRAPHY | Age: 45
Discharge: HOME | End: 2020-08-01
Payer: COMMERCIAL

## 2020-07-30 PROCEDURE — G0279 TOMOSYNTHESIS, MAMMO: HCPCS

## 2020-07-30 PROCEDURE — 76642 ULTRASOUND BREAST LIMITED: CPT

## 2020-08-15 ENCOUNTER — PATIENT MESSAGE (OUTPATIENT)
Dept: FAMILY MEDICINE CLINIC | Age: 45
End: 2020-08-15

## 2020-11-02 NOTE — PROGRESS NOTES
Clinton Christopher wants to use an appetite suppressant. We discussed Adipex as a good option. Current weight 206  . Weight goal 160. Patient advised to practice healthy eating habits. Diet should include plenty of fruits, vegetables, whole grains, lean protein, and low-fat dairy. Increase water consumption. Reduce consumption of dietary sugar and sugar-sweetened beverages. Increasing physical exercises at least 3-4 times a week. In addition to the medication, patient also using diet and exercise as follows:  -diet: MD diet  -exercise:walk and gym-gonzález    Since Adipex is a controlled substance. Patient informed that when prescribed this medication for weight loss, the provider is required by law to see the patient for an appointment every thirty days. This is neccessary to record the weight and blood pressure and to assess patient's efforts to lose weight, and to ensure there are no contraindications or adverse effects. Discussed contraindications to controlled substance anorexiant: NONE ( EtOH, drug abuse, pregnancy, BMI < 27 without co morbidities, if patient did not make substantial effort to lose weight)    Patient is going to continue with this medication for the next 4 weeks.   BP Readings from Last 3 Encounters:   11/03/20 120/80   07/10/20 120/88   04/02/20 122/74      Pulse Readings from Last 3 Encounters:   11/03/20 68   03/05/20 70   02/21/20 72     Wt Readings from Last 3 Encounters:   11/03/20 206 lb (93.4 kg)   07/10/20 208 lb (94.3 kg)   05/21/20 208 lb (94.3 kg)     Exercise Tracking - Detailed 3/8/2019   Walking Duration -   Biking Duration -   Running Duration -   Swimming Duration -   Cardiovascular Equipment Duration -   Exercise Classes / Videos Duration -   Exercise Classes / Videos Intensity -   Exercise Classes / Videos Times/Week -   Strength Training Duration -   Other Exercise Comment circut   Other Duration 60   Other Intensity Moderate   Other Times/Week 4   Pedometer Steps/Day - Total Exercise Minutes/Week 240            . No data recorded   Counseling given: Not Answered    Patient Active Problem List   Diagnosis    History of tubal ligation    Vision abnormalities    Herpes simplex type 1 infection    History of endometrial ablation    Dyslipidemia (high LDL; low HDL)    Hypertriglyceridemia    Hyperglycemia    Prediabetes    Severe obesity (BMI 35.0-35.9 with comorbidity) (HCC)    History of kidney stones    Snoring    Asthma, exercise induced    History of colon polyps    Slow transit constipation    Unable to lose weight    Anxiety    Elevated BP without diagnosis of hypertension    Heart palpitations    Chronic fatigue    Hot flashes    Current mild episode of major depressive disorder without prior episode ([address]    Abnormal EKG    Family history of premature CAD    History of uterine fibroid    Enlarged tonsils      Past Medical History:   Diagnosis Date    Anxiety 9/20/2019    Asthma     Current mild episode of major depressive disorder without prior episode ([address] 2/13/2020    Dyslipidemia (high LDL; low HDL) 5/22/2017    History of endometrial ablation     History of kidney stones 5/22/2017    History of tubal ligation     Hyperglycemia 5/22/2017    Hypertriglyceridemia 5/22/2017    Hypomagnesemia 2/14/2020    Obesity (BMI 30-39. 9) 5/22/2017    Pre-syncope 2/13/2020    Vision abnormalities     glasses/contacts      Past Surgical History:   Procedure Laterality Date    COLONOSCOPY      COLONOSCOPY  06/28/2019    COLONOSCOPY N/A 6/28/2019    COLORECTAL CANCER SCREENING, NOT HIGH RISK performed by Dara Mares MD at [address] [place] [address] Drive      LITHOTRIPSY      TUBAL LIGATION      TUBAL LIGATION      WISDOM TOOTH EXTRACTION       Family History   Problem Relation Age of Onset    Diabetes Father         possible type 1    Heart Attack Father 35    Bipolar Disorder Sister     Lung Cancer Paternal Grandmother     Heart Attack Paternal Grandfather      Current Outpatient Medications   Medication Sig Dispense Refill    phentermine (ADIPEX-P) 37.5 MG tablet Take 1 tablet by mouth every morning (before breakfast) for 30 days. 30 tablet 0    fluticasone (FLONASE) 50 MCG/ACT nasal spray 2 sprays by Nasal route nightly 1 Bottle 3    Insulin Pen Needle 31G X 5 MM MISC 1 each by Does not apply route daily TO BE USED with VICTOZA, DAILY 100 each 3    Liraglutide (VICTOZA) 18 MG/3ML SOPN SC injection Inject 1.2 mg into the skin daily 3 pen 3    metFORMIN (GLUCOPHAGE-XR) 500 MG extended release tablet Take 2 tablets by mouth daily (with breakfast) 180 tablet 3    Magnesium 400 MG CAPS Take by mouth      buPROPion (WELLBUTRIN XL) 150 MG extended release tablet Take 1 tablet by mouth every morning 30 tablet 3    albuterol sulfate HFA (PROAIR HFA) 108 (90 Base) MCG/ACT inhaler Inhale 2 puffs into the lungs every 6 hours as needed for Wheezing or Shortness of Breath (cough) 8 g 3    busPIRone (BUSPAR) 7.5 MG tablet Take 7.5 mg by mouth 2 times daily as needed for Anxiety Prescribed as BID, but patient takes PRN BID       No current facility-administered medications for this visit. Review of Systems   Constitutional: Positive for activity change and unexpected weight change. Negative for chills, fatigue and fever. HENT: Positive for sore throat. Respiratory: Negative. Negative for cough and shortness of breath. Cardiovascular: Negative. Negative for chest pain and palpitations. Gastrointestinal: Negative for abdominal pain. Endocrine: Positive for polyphagia. Genitourinary: Negative for dysuria. Musculoskeletal: Negative for arthralgias and myalgias. Skin: Negative for rash. Neurological: Negative for light-headedness and headaches. Psychiatric/Behavioral: Positive for sleep disturbance. The patient is nervous/anxious. Prior to Visit Medications    Medication Sig Taking? Authorizing Provider   phentermine (ADIPEX-P) 37.5 MG tablet Take 1 tablet by mouth every morning (before breakfast) for 30 days. Yes Anais Robb, MATT - CNP   fluticasone (FLONASE) 50 MCG/ACT nasal spray 2 sprays by Nasal route nightly Yes Tamia Mares MD   Insulin Pen Needle 31G X 5 MM MISC 1 each by Does not apply route daily TO BE USED with VICTOZA, DAILY Yes Tamia Mares MD   Liraglutide (VICTOZA) 18 MG/3ML SOPN SC injection Inject 1.2 mg into the skin daily Yes Tamia Mares MD   metFORMIN (GLUCOPHAGE-XR) 500 MG extended release tablet Take 2 tablets by mouth daily (with breakfast) Yes Tamia Mares MD   Magnesium 400 MG CAPS Take by mouth Yes Historical Provider, MD   buPROPion (WELLBUTRIN XL) 150 MG extended release tablet Take 1 tablet by mouth every morning Yes Tamia Mraes MD   albuterol sulfate HFA (PROAIR HFA) 108 (90 Base) MCG/ACT inhaler Inhale 2 puffs into the lungs every 6 hours as needed for Wheezing or Shortness of Breath (cough) Yes Tamia Mares MD   busPIRone (BUSPAR) 7.5 MG tablet Take 7.5 mg by mouth 2 times daily as needed for Anxiety Prescribed as BID, but patient takes PRN BID  Historical Provider, MD      Social History     Tobacco Use    Smoking status: Never Smoker    Smokeless tobacco: Never Used   Substance Use Topics    Alcohol use: Yes     Alcohol/week: 0.0 standard drinks     Comment: Socially      Vitals:    11/03/20 1513   BP: 120/80   Pulse: 68   SpO2: 98%   Weight: 206 lb (93.4 kg)   Height: 5' 1\" (1.549 m)     Estimated body mass index is 38.92 kg/m² as calculated from the following:    Height as of this encounter: 5' 1\" (1.549 m). Weight as of this encounter: 206 lb (93.4 kg). Physical Exam  Vitals signs and nursing note reviewed. Constitutional:       Appearance: She is well-developed. HENT:      Head: Normocephalic.       Right Ear: Tympanic membrane and external ear normal.      Left Ear: Tympanic membrane and external ear normal.      Nose: Nose normal.      Mouth/Throat:      Mouth: Mucous membranes are moist.      Pharynx: Posterior oropharyngeal erythema present. Tonsils: 2+ on the right. 2+ on the left. Eyes:      Pupils: Pupils are equal, round, and reactive to light. Neck:      Musculoskeletal: Normal range of motion and neck supple. Thyroid: No thyromegaly. Vascular: No JVD. Cardiovascular:      Rate and Rhythm: Normal rate and regular rhythm. Pulses: Normal pulses. Heart sounds: Normal heart sounds. No murmur. Pulmonary:      Effort: Pulmonary effort is normal. No respiratory distress. Breath sounds: Normal breath sounds. Abdominal:      General: Abdomen is protuberant. Bowel sounds are normal. There is no distension. Palpations: Abdomen is soft. Tenderness: There is no abdominal tenderness. Comments: obese   Musculoskeletal: Normal range of motion. Lymphadenopathy:      Cervical: No cervical adenopathy. Skin:     General: Skin is warm and dry. Capillary Refill: Capillary refill takes less than 2 seconds. Neurological:      Mental Status: She is alert and oriented to person, place, and time. Psychiatric:         Mood and Affect: Mood is anxious. Speech: Speech is rapid and pressured. Behavior: Behavior normal.       Most recent labs reviewed with patient, and all questions answered.   Lab Results   Component Value Date    WBC 5.3 02/14/2020    HGB 12.7 02/14/2020    HCT 39.6 02/14/2020    MCV 93.8 02/14/2020     02/14/2020     Lab Results   Component Value Date     02/14/2020    K 4.1 02/14/2020    CL 99 02/14/2020    CO2 23 02/14/2020    BUN 7 02/14/2020    CREATININE 0.72 02/14/2020    GLUCOSE 94 02/14/2020    GLUCOSE 90 12/16/2011    CALCIUM 9.7 02/14/2020      Lab Results   Component Value Date    ALT 13 02/14/2020    AST 12 02/14/2020    ALKPHOS 62 02/14/2020    BILITOT 0.38 02/14/2020     Lab Results   Component Value Date    TSH 1.47 02/14/2020     Lab Results   Component Value Date    CHOL 227 (H) 02/14/2020    CHOL 194 11/03/2018    CHOL 226 (H) 05/22/2017     Lab Results   Component Value Date    TRIG 262 (H) 02/14/2020    TRIG 156 (H) 11/03/2018    TRIG 246 (H) 05/22/2017     Lab Results   Component Value Date    HDL 42 02/14/2020    HDL 42 11/03/2018    HDL 49 05/22/2017     Lab Results   Component Value Date    LDLCHOLESTEROL 133 (H) 02/14/2020    LDLCHOLESTEROL 121 11/03/2018    LDLCHOLESTEROL 128 05/22/2017     Lab Results   Component Value Date    CHOLHDLRATIO 5.4 (H) 02/14/2020    CHOLHDLRATIO 4.6 11/03/2018    CHOLHDLRATIO 4.6 05/22/2017     Lab Results   Component Value Date    LABA1C 5.9 11/03/2020      No results found for: [id number]  No results found for: FOLATE  Lab Results   Component Value Date    VITD25 34.0 04/06/2013     ASSESSMENT/PLAN:  1. Prediabetes  Worsening  Continue therapy. DISCUSSED and ADVISED TO:  Decrease carbohydrates, sugary drinks, desserts in your diet. Exercise regularly, as tolerated. Try to lose weight. - phentermine (ADIPEX-P) 37.5 MG tablet; Take 1 tablet by mouth every morning (before breakfast) for 30 days. Dispense: 30 tablet; Refill: 0  - POCT glycosylated hemoglobin (Hb A1C)    2. Unable to lose weight  Worsening  Start Adipex  UDS ordered    - phentermine (ADIPEX-P) 37.5 MG tablet; Take 1 tablet by mouth every morning (before breakfast) for 30 days. Dispense: 30 tablet; Refill: 0  - Urine Drug Screen; Future    3. Enlarged tonsils  Worsening  Continue to evaluate  Sleep study    4. Class 2 severe obesity with serious comorbidity and body mass index (BMI) of 38.0 to 38.9 in adult, unspecified obesity type ([address]  BMI increasing  DISCUSSED AND ADVISED TO:  Eat a low-fat and low carbohydrates diet. Avoid fried foods especially fast food. Choose healthier options for snacks. Have 5-6 servings of fruits and vegetables per day. Cut down on eating processed food.   Add 30 minutes to 1 hour aerobic exercise for 3-4 days a week. - phentermine (ADIPEX-P) 37.5 MG tablet; Take 1 tablet by mouth every morning (before breakfast) for 30 days. Dispense: 30 tablet; Refill: 0  - Urine Drug Screen; Future    5. Need for influenza vaccination  Recommended by CDC. No current infection. Denies previous adverse reaction to vaccination.    - INFLUENZA, QUADV, 3 YRS AND OLDER, IM PF, PREFILL SYR OR SDV, 0.5ML (AFLURIA QUADV, PF)     Controlled Substance Monitoring:  Acute and Chronic Pain Monitoring:   RX Monitoring 11/2/2020   Attestation The Prescription Monitoring Report for this patient was reviewed today. Periodic Controlled Substance Monitoring No signs of potential drug abuse or diversion identified. Chronic Pain > 50 MEDD -     Orders Placed This Encounter   Procedures    INFLUENZA, QUADV, 3 YRS AND OLDER, IM PF, PREFILL SYR OR SDV, 0.5ML (AFLURIA QUADV, PF)    Urine Drug Screen     Standing Status:   Future     Standing Expiration Date:   11/3/2021    POCT glycosylated hemoglobin (Hb A1C)     Orders Placed This Encounter   Medications    phentermine (ADIPEX-P) 37.5 MG tablet     Sig: Take 1 tablet by mouth every morning (before breakfast) for 30 days. Dispense:  30 tablet     Refill:  0      There are no discontinued medications. There are no preventive care reminders to display for this patient. Return in about 4 weeks (around 12/1/2020) for Adipex, wt BP, sleep study,. Crystal received counseling on the following healthy behaviors: nutrition, exercise and medication adherence  Reviewed prior labs and health maintenance  Continue current medications, diet and exercise. Discussed use, benefit, and side effects of prescribed medications. Barriers to medication compliance addressed. Patient given educational materials - see patient instructions  Was a self-tracking handout given in paper form or via [employer]?  Yes    Requested Prescriptions     Signed Prescriptions Disp

## 2020-11-03 ENCOUNTER — OFFICE VISIT (OUTPATIENT)
Dept: FAMILY MEDICINE CLINIC | Age: 45
End: 2020-11-03
Payer: COMMERCIAL

## 2020-11-03 VITALS
WEIGHT: 206 LBS | HEART RATE: 68 BPM | SYSTOLIC BLOOD PRESSURE: 120 MMHG | OXYGEN SATURATION: 98 % | DIASTOLIC BLOOD PRESSURE: 80 MMHG | HEIGHT: 61 IN | BODY MASS INDEX: 38.89 KG/M2

## 2020-11-03 PROBLEM — J35.1 ENLARGED TONSILS: Status: ACTIVE | Noted: 2020-11-03

## 2020-11-03 LAB — HBA1C MFR BLD: 5.9 %

## 2020-11-03 PROCEDURE — 90471 IMMUNIZATION ADMIN: CPT | Performed by: FAMILY MEDICINE

## 2020-11-03 PROCEDURE — 90686 IIV4 VACC NO PRSV 0.5 ML IM: CPT | Performed by: FAMILY MEDICINE

## 2020-11-03 PROCEDURE — 99214 OFFICE O/P EST MOD 30 MIN: CPT | Performed by: FAMILY MEDICINE

## 2020-11-03 PROCEDURE — 83036 HEMOGLOBIN GLYCOSYLATED A1C: CPT | Performed by: FAMILY MEDICINE

## 2020-11-03 RX ORDER — PHENTERMINE HYDROCHLORIDE 37.5 MG/1
37.5 TABLET ORAL
Qty: 30 TABLET | Refills: 0 | Status: SHIPPED | OUTPATIENT
Start: 2020-11-03 | End: 2020-12-03

## 2020-11-03 ASSESSMENT — ENCOUNTER SYMPTOMS
RESPIRATORY NEGATIVE: 1
COUGH: 0
SORE THROAT: 1
ABDOMINAL PAIN: 0
SHORTNESS OF BREATH: 0

## 2020-11-03 NOTE — PATIENT INSTRUCTIONS
sugar-sweetened drinks like soda. The Mediterranean diet may also include red wine with your meal--1 glass each day for women and up to 2 glasses a day for men. Tips for eating at home  · Use herbs, spices, garlic, lemon zest, and citrus juice instead of salt to add flavor to foods. · Add avocado slices to your sandwich instead of berry. · Have fish for lunch or dinner instead of red meat. Brush the fish with olive oil, and broil or grill it. · Sprinkle your salad with seeds or nuts instead of cheese. · Cook with olive or canola oil instead of butter or oils that are high in saturated fat. · Switch from 2% milk or whole milk to 1% or fat-free milk. · Dip raw vegetables in a vinaigrette dressing or hummus instead of dips made from mayonnaise or sour cream.  · Have a piece of fruit for dessert instead of a piece of cake. Try baked apples, or have some dried fruit. Tips for eating out  · Try broiled, grilled, baked, or poached fish instead of having it fried or breaded. · Ask your  to have your meals prepared with olive oil instead of butter. · Order dishes made with marinara sauce or sauces made from olive oil. Avoid sauces made from cream or mayonnaise. · Choose whole-grain breads, whole wheat pasta and pizza crust, brown rice, beans, and lentils. · Cut back on butter or margarine on bread. Instead, you can dip your bread in a small amount of olive oil. · Ask for a side salad or grilled vegetables instead of french fries or chips. Where can you learn more? Go to [email or web address] and sign in to your [employer] account. Enter [phone] in the [facility] box to learn more about \"Learning About the Mediterranean Diet. \"     If you do not have an account, please click on the \"Sign Up Now\" link. Current as of: August 22, 2019               Content Version: 12.6  © 9818-2759 [email or web address]. Care instructions adapted under license by [facility] ([facility]).  If you have questions about a medical condition or this instruction, always ask your healthcare professional. [place] [address] any warranty or liability for your use of this information.

## 2020-11-23 ENCOUNTER — TELEPHONE (OUTPATIENT)
Dept: FAMILY MEDICINE CLINIC | Age: 45
End: 2020-11-23

## 2020-11-23 PROBLEM — E78.5 HYPERLIPIDEMIA WITH TARGET LDL LESS THAN 100: Status: ACTIVE | Noted: 2017-05-22

## 2020-11-23 RX ORDER — PRAVASTATIN SODIUM 40 MG
40 TABLET ORAL EVERY EVENING
Qty: 90 TABLET | Refills: 3 | Status: SHIPPED
Start: 2020-11-23 | End: 2021-08-05

## 2020-11-23 NOTE — TELEPHONE ENCOUNTER
I received a letter from [facility] Rx management I am not sure what it is and what is coming from    I think she has enrolled in a medication program and they are requesting me to add her on statin due to high cholesterol. Can I send to the pharmacy pravastatin 40 mg? I will place the form back in the box      Please let the patient know to  prescription from pharmacy. Requested Prescriptions     Signed Prescriptions Disp Refills    pravastatin (PRAVACHOL) 40 MG tablet 90 tablet 3     Sig: Take 1 tablet by mouth every evening For high cholesterol and triglycerides     Authorizing Provider: Jamil Le [address] [place] [address]  Phone: [phone] Fax: [phone]  Thank you!         FYI    Future Appointments   Date Time Provider Diamond Gonzalez   12/1/2020  4:15 PM MATT Mayer CNP [id number]   12/6/2020  7:30 PM [facility] SLEEP RM 7 [facility]   12/29/2020  4:15 PM MATT Mayer CNP sc [id number]       Controlled Substances Monitoring:            Lab Results   Component Value Date    CHOL 227 (H) 02/14/2020    CHOL 194 11/03/2018    CHOL 226 (H) 05/22/2017     Lab Results   Component Value Date    TRIG 262 (H) 02/14/2020    TRIG 156 (H) 11/03/2018    TRIG 246 (H) 05/22/2017     Lab Results   Component Value Date    HDL 42 02/14/2020    HDL 42 11/03/2018    HDL 49 05/22/2017     Lab Results   Component Value Date    LDLCHOLESTEROL 133 (H) 02/14/2020    LDLCHOLESTEROL 121 11/03/2018    LDLCHOLESTEROL 128 05/22/2017     Lab Results   Component Value Date    VLDL NOT REPORTED (H) 02/14/2020    VLDL NOT REPORTED 11/03/2018    VLDL NOT REPORTED 05/22/2017     Lab Results   Component Value Date    CHOLHDLRATIO 5.4 (H) 02/14/2020    CHOLHDLRATIO 4.6 11/03/2018    CHOLHDLRATIO 4.6 05/22/2017

## 2020-11-27 ENCOUNTER — HOSPITAL ENCOUNTER (OUTPATIENT)
Dept: SLEEP CENTER | Age: 45
Discharge: HOME | End: 2020-11-29
Payer: COMMERCIAL

## 2020-11-27 VITALS
WEIGHT: 208 LBS | HEART RATE: 68 BPM | OXYGEN SATURATION: 98 % | HEIGHT: 61 IN | BODY MASS INDEX: 39.27 KG/M2 | RESPIRATION RATE: 18 BRPM | TEMPERATURE: 97.3 F

## 2020-11-27 PROCEDURE — 95810 POLYSOM 6/> YRS 4/> PARAM: CPT

## 2020-11-30 NOTE — PROGRESS NOTES
Visit Information    Have you changed or started any medications since your last visit including any over-the-counter medicines, vitamins, or herbal medicines? no   Are you having any side effects from any of your medications? -  no  Have you stopped taking any of your medications? Is so, why? -  no    Have you seen any other physician or provider since your last visit? No  Have you had any other diagnostic tests since your last visit? No  Have you been seen in the emergency room and/or had an admission to a hospital since we last saw you? No  Have you had your routine dental cleaning in the past 6 months? no    Have you activated your [employer] account? If not, what are your barriers?  Yes     Patient Care Team:  Latanya Gutiérrez MD as PCP - General (Family Medicine)  Latanya Gutiérrez MD as PCP - [address] [place] [address]  [facility] Provider  MATT Celeste CNP as Nurse Practitioner (Certified Nurse Practitioner)  Jose M Moise MD as Consulting Physician (Urology)  MATT Gonzalez CNP (Cardiology)  Rick Jeorme MD as Consulting Physician (Cardiology)    Medical History Review  Past Medical, Family, and Social History reviewed and does contribute to the patient presenting condition    Health Maintenance   Topic Date Due    DTaP/Tdap/Td vaccine (1 - Tdap) 07/03/2022 (Originally 2/22/1994)    Lipid screen  02/14/2021    A1C test (Diabetic or Prediabetic)  11/03/2021    Cervical cancer screen  07/10/2025    Colon cancer screen colonoscopy  06/28/2029    Flu vaccine  Completed    HIV screen  Completed    Hepatitis A vaccine  Aged Out    Hepatitis B vaccine  Aged Out    Hib vaccine  Aged Out    Meningococcal (ACWY) vaccine  Aged Out    Pneumococcal 0-64 years Vaccine  Aged Out

## 2020-12-01 ENCOUNTER — TELEMEDICINE (OUTPATIENT)
Dept: FAMILY MEDICINE CLINIC | Age: 45
End: 2020-12-01
Payer: COMMERCIAL

## 2020-12-01 PROBLEM — Z76.89 ENCOUNTER FOR WEIGHT MANAGEMENT: Status: ACTIVE | Noted: 2020-12-01

## 2020-12-01 PROCEDURE — 99213 OFFICE O/P EST LOW 20 MIN: CPT | Performed by: FAMILY MEDICINE

## 2020-12-01 RX ORDER — PHENTERMINE HYDROCHLORIDE 37.5 MG/1
37.5 TABLET ORAL
Qty: 30 TABLET | Refills: 0 | Status: SHIPPED | OUTPATIENT
Start: 2020-12-01 | End: 2020-12-31

## 2020-12-01 ASSESSMENT — ENCOUNTER SYMPTOMS
ABDOMINAL DISTENTION: 0
SHORTNESS OF BREATH: 0
CHEST TIGHTNESS: 0
CONSTIPATION: 0
COUGH: 0
PHOTOPHOBIA: 0
ABDOMINAL PAIN: 0

## 2020-12-01 NOTE — PROGRESS NOTES
[address] [address]  Phone: [phone], Fax: [phone]    TELEHEALTH EVALUATION -- Audio/Visual (During VWALX-96 public health emergency)    Patient ID verified by me prior to start of this visit    Lucille Jenkins (:  1975) has requested an audio/video evaluation for the following concern(s):  Chief Complaint   Patient presents with    Weight Management     adipex #2      HPI:  Lucille Jenkins is an established patient of Dejuan Carter MD  . Patient has a history of obesity on Adipex  Wants to lose weight. Lucille Jenkins was started on Adipex. Patient is here for refill of Adipex #2    In addition to the medication, patient also using diet and exercise as follows:  -diet: She takes low-carb low-fat diet. -exercise: She works on treadmill and also does cardio reports 4 to 5 days of exercise in a week. She works from home. She has lost about 8 pounds in 1 month. Medication side effects: None. Patient denies anorexia, nausea, vomiting, abdominal pain, involuntary weight loss, palpitations, insomnia, irritability and headache. Patient informed that when prescribed a controlled substance for weight loss, the provider is required by law to see the patient for an appointment every thirty days. This is neccessary to record the weight and blood pressure and to assess patient's efforts to lose weight, and to ensure there are no contraindications or adverse effects. blood pressure is normal  BP Readings from Last 3 Encounters:   20 120/80   07/10/20 120/88   20 122/74        Pulse is Normal.     Pulse Readings from Last 3 Encounters:   20 68   20 68   20 70       Weight has been decreasing .   Patient intentionally lost 8lb in 1 month  Wt Readings from Last 3 Encounters:   20 208 lb (94.3 kg)   20 206 lb (93.4 kg)   07/10/20 208 lb (94.3 kg)         Exercise Tracking - Detailed 3/8/2019   Walking Duration -   Biking Duration -   Running Duration -   Swimming Duration -   Cardiovascular Equipment Duration -   Exercise Classes / Videos Duration -   Exercise Classes / Videos Intensity -   Exercise Classes / Videos Times/Week -   Strength Training Duration -   Other Exercise Comment circut   Other Duration 60   Other Intensity Moderate   Other Times/Week 4   Pedometer Steps/Day -   Total Exercise Minutes/Week 240                 Review of Systems   Constitutional: Negative for activity change, appetite change, fever and unexpected weight change. HENT: Negative for congestion. Eyes: Negative for photophobia and visual disturbance. Respiratory: Negative for cough, chest tightness and shortness of breath. Cardiovascular: Negative for chest pain, palpitations and leg swelling. Gastrointestinal: Negative for abdominal distention, abdominal pain and constipation. Genitourinary: Negative for difficulty urinating and frequency. Musculoskeletal: Negative for neck stiffness. Neurological: Negative for weakness and headaches. Psychiatric/Behavioral: Negative for agitation and dysphoric mood. The patient is not nervous/anxious.         Patient Active Problem List    Diagnosis Date Noted    Encounter for weight management 12/01/2020    Enlarged tonsils 11/03/2020    History of uterine fibroid 06/29/2020    Family history of premature CAD 02/21/2020    Heart palpitations 02/13/2020    Chronic fatigue 02/13/2020    Hot flashes 02/13/2020    Current mild episode of major depressive disorder without prior episode ([facility] [address] 02/13/2020    Abnormal EKG 02/13/2020    Unable to lose weight 09/20/2019    Anxiety 09/20/2019    Elevated BP without diagnosis of hypertension 09/20/2019    History of colon polyps 03/10/2019    Slow transit constipation 03/10/2019    Asthma, exercise induced 10/19/2018    Snoring 07/02/2018    History of endometrial ablation 05/22/2017    Dyslipidemia (high LDL; low HDL) 05/22/2017    Hyperlipidemia with target LDL less than 100 05/22/2017    Hyperglycemia 05/22/2017    Prediabetes 05/22/2017    Severe obesity (BMI 35.0-35.9 with comorbidity) ([facility] [address] 05/22/2017    History of kidney stones 05/22/2017    Herpes simplex type 1 infection 05/23/2016    History of tubal ligation     Vision abnormalities         Past Surgical History:   Procedure Laterality Date    COLONOSCOPY      COLONOSCOPY  06/28/2019    COLONOSCOPY N/A 6/28/2019    COLORECTAL CANCER SCREENING, NOT HIGH RISK performed by Jairon Ribera MD at [address] [place] [address] ([facility]) LITHOTRIPSY      TUBAL LIGATION      TUBAL LIGATION      WISDOM TOOTH EXTRACTION       Family History   Problem Relation Age of Onset    Diabetes Father         possible type 1    Heart Attack Father 35    Bipolar Disorder Sister     Lung Cancer Paternal Grandmother     Heart Attack Paternal Grandfather      Current Outpatient Medications   Medication Sig Dispense Refill    phentermine (ADIPEX-P) 37.5 MG tablet Take 1 tablet by mouth every morning (before breakfast) for 30 days. 30 tablet 0    pravastatin (PRAVACHOL) 40 MG tablet Take 1 tablet by mouth every evening For high cholesterol and triglycerides 90 tablet 3    phentermine (ADIPEX-P) 37.5 MG tablet Take 1 tablet by mouth every morning (before breakfast) for 30 days.  30 tablet 0    fluticasone (FLONASE) 50 MCG/ACT nasal spray 2 sprays by Nasal route nightly 1 Bottle 3    Insulin Pen Needle 31G X 5 MM MISC 1 each by Does not apply route daily TO BE USED with VICTOZA, DAILY 100 each 3    Liraglutide (VICTOZA) 18 MG/3ML SOPN SC injection Inject 1.2 mg into the skin daily 3 pen 3    metFORMIN (GLUCOPHAGE-XR) 500 MG extended release tablet Take 2 tablets by mouth daily (with breakfast) 180 tablet 3    Magnesium 400 MG CAPS Take by mouth      buPROPion (WELLBUTRIN XL) 150 MG extended release tablet Take 1 tablet by mouth every morning 30 tablet 3    albuterol sulfate HFA (PROAIR HFA) 108 (90 Base) MCG/ACT inhaler Inhale 2 puffs into the lungs every 6 hours as needed for Wheezing or Shortness of Breath (cough) 8 g 3    busPIRone (BUSPAR) 7.5 MG tablet Take 7.5 mg by mouth 2 times daily as needed for Anxiety Prescribed as BID, but patient takes PRN BID       No current facility-administered medications for this visit. No Known Allergies     Social History     Tobacco Use    Smoking status: Never Smoker    Smokeless tobacco: Never Used   Substance Use Topics    Alcohol use: Yes     Alcohol/week: 0.0 standard drinks     Comment: Socially    Drug use: No        PHYSICAL EXAMINATION:  Vital Signs: (As obtained by patient/caregiver or practitioner observation)  Patient-Reported Vitals 12/1/2020   Patient-Reported Weight 197   Patient-Reported Height 51   Patient-Reported Systolic 527   Patient-Reported Diastolic 82   Patient-Reported Pulse 85bpm   Patient-Reported Temperature 97.3   Patient-Reported SpO2 No   Patient-Reported Peak Flow No        Constitutional: [x] Appears well-developed and well-nourished [x] No apparent distress      [] Abnormal-   Mental status  [x] Alert and awake  [x] Oriented to person/place/time [x]Able to follow commands      Eyes:  EOM    [x]  Normal  [] Abnormal-  Sclera  [x]  Normal  [] Abnormal -         Discharge [x]  None visible  [] Abnormal -    HENT:   [x] Normocephalic, atraumatic.   [] Abnormal   [x] Mouth/Throat: Mucous membranes are moist.     External Ears [x] Normal  [] Abnormal-     Neck: [x] No visualized mass     Pulmonary/Chest: [x] Respiratory effort normal.  [x] No visualized signs of difficulty breathing or respiratory distress        [] Abnormal     Musculoskeletal:   [x] Normal gait with no signs of ataxia         [x] Normal range of motion of neck        [] Abnormal-     Neurological:        [x] No Facial Asymmetry (Cranial nerve 7 motor function) (limited exam to video visit) [x] No gaze palsy        [] Abnormal-     Skin:        [x] No significant exanthematous lesions or discoloration noted on facial skin         [] Abnormal-     Psychiatric:       [x] Normal Affect [x] No Hallucinations        [x] Abnormal- Anxious, with pressured speech    Other pertinent observable physical exam findings-   Lab Results   Component Value Date    WBC 5.3 02/14/2020    HGB 12.7 02/14/2020    HCT 39.6 02/14/2020    MCV 93.8 02/14/2020     02/14/2020     Lab Results   Component Value Date     02/14/2020    K 4.1 02/14/2020    CL 99 02/14/2020    CO2 23 02/14/2020    BUN 7 02/14/2020    CREATININE 0.72 02/14/2020    GLUCOSE 94 02/14/2020    GLUCOSE 90 12/16/2011    CALCIUM 9.7 02/14/2020        Due to this being a TeleHealth encounter, evaluation of the following organ systems is limited: Vitals/Constitutional/EENT/Resp/CV/GI//MS/Neuro/Skin/Heme-Lymph-Imm. ASSESSMENT/PLAN:  1. Severe obesity (BMI 35.0-35.9 with comorbidity) ([facility] [address]  Doing well refill Adipex continue diet and exercise  - phentermine (ADIPEX-P) 37.5 MG tablet; Take 1 tablet by mouth every morning (before breakfast) for 30 days. Dispense: 30 tablet; Refill: 0    2. Unable to lose weight  Refill Adipex follow-up in 1 month  - phentermine (ADIPEX-P) 37.5 MG tablet; Take 1 tablet by mouth every morning (before breakfast) for 30 days. Dispense: 30 tablet; Refill: 0    3. Encounter for weight management    - phentermine (ADIPEX-P) 37.5 MG tablet; Take 1 tablet by mouth every morning (before breakfast) for 30 days. Dispense: 30 tablet; Refill: 0    Controlled Substance Monitoring:  Acute and Chronic Pain Monitoring:   RX Monitoring 11/2/2020   Attestation The Prescription Monitoring Report for this patient was reviewed today. Periodic Controlled Substance Monitoring No signs of potential drug abuse or diversion identified. Chronic Pain > 50 MEDD -     No orders of the defined types were placed in this encounter. Orders Placed This Encounter   Medications    phentermine (ADIPEX-P) 37.5 MG tablet     Sig: Take 1 tablet by mouth every morning (before breakfast) for 30 days. Dispense:  30 tablet     Refill:  0      There are no discontinued medications. Celia received counseling on the following healthy behaviors: nutrition, exercise and medication adherence  Reviewed prior labs and health maintenance. Continue current medications, diet and exercise. Discussed use, benefit, and side effects of prescribed medications. Barriers to medication compliance addressed. Patient given educational materials - see patient instructions. All patient questions answered. Patient voiced understanding. No follow-ups on file. Arlette Sim is a 54 y.o. female patient  being evaluated by a Virtual Visit (video visit) encounter to address concerns as mentioned above. 15A caregiver was present when appropriate. Due to this being a TeleHealth encounter (During QJXFC-78 public health emergency), evaluation of the following organ systems was limited:Vitals/Constitutional/EENT/Resp/CV/GI//MS/Neuro/Skin/Heme-Lymph-Imm. Services were provided through a video synchronous discussion virtually to substitute for in-person clinic visit. This is a telehealth visit that was performed with the originating site at Patient Location: home and provider Location of [place], [place]. Verbal consent to participate in video visit was obtained. Patient ID verified by me prior to start of this visit  I discussed with the patient the nature of our telehealth visits via interactive/real-time audio/video that:  - I would evaluate the patient and recommend diagnostics and treatments based on my assessment  - Our sessions are not being recorded and that personal health information is protected  - Our team would provide follow up care in person if/when the patient needs it.      Pursuant to the emergency declaration under the [address] and the National Emergencies Act, [facility] waiver authority and the [employer] and [employer] General Act, this Virtual Visit was conducted with patient's (and/or legal guardian's) consent, to reduce the patient's risk of exposure to COVID-19 and provide necessary medical care. The patient (and/or legal guardian) has also been advised to contact this office for worsening conditions or problems, and seek emergency medical treatment and/or call 911 if deemed necessary. This note was completed by using the assistance of a speech-recognition program. However, inadvertent computerized transcription errors may be present. Although every effort was made to ensure accuracy, no guarantees can be provided that every mistake has been identified and corrected by editing.   Electronically signed by Carin Shoemaker MD on 12/1/20 at 9:40 AM EST

## 2020-12-17 LAB — STATUS: NORMAL

## 2020-12-18 NOTE — PROGRESS NOTES
Please let the patient know that her  recent sleep study shows sleep apnea. I placed an order for a CPAP trial. I also sent referral to the pulmonologist- lung doctor to manage her for this condition. Thanks.

## 2020-12-22 ENCOUNTER — TELEPHONE (OUTPATIENT)
Dept: FAMILY MEDICINE CLINIC | Age: 45
End: 2020-12-22

## 2020-12-22 NOTE — TELEPHONE ENCOUNTER
Patient called in because she was giving herself an injection of insulin in her stomach and accidentally injected into a vein. I spoke with dr Scar Storm and she said to ice the area and observe for any weird symptoms and it gets worse to go to ED. I went over those instructions with patient and she states she understood.

## 2021-01-05 ENCOUNTER — TELEMEDICINE (OUTPATIENT)
Dept: FAMILY MEDICINE CLINIC | Age: 46
End: 2021-01-05
Payer: COMMERCIAL

## 2021-01-05 VITALS — BODY MASS INDEX: 36.38 KG/M2 | HEIGHT: 61 IN | WEIGHT: 192.7 LBS

## 2021-01-05 DIAGNOSIS — R73.03 PREDIABETES: Primary | ICD-10-CM

## 2021-01-05 DIAGNOSIS — Z76.89 ENCOUNTER FOR WEIGHT MANAGEMENT: ICD-10-CM

## 2021-01-05 DIAGNOSIS — R63.8 UNABLE TO LOSE WEIGHT: ICD-10-CM

## 2021-01-05 DIAGNOSIS — E66.01 CLASS 2 SEVERE OBESITY DUE TO EXCESS CALORIES WITH SERIOUS COMORBIDITY AND BODY MASS INDEX (BMI) OF 36.0 TO 36.9 IN ADULT (HCC): ICD-10-CM

## 2021-01-05 PROCEDURE — 99212 OFFICE O/P EST SF 10 MIN: CPT | Performed by: FAMILY MEDICINE

## 2021-01-05 RX ORDER — PHENTERMINE HYDROCHLORIDE 37.5 MG/1
37.5 TABLET ORAL
Qty: 30 TABLET | Refills: 0 | Status: SHIPPED | OUTPATIENT
Start: 2021-01-05 | End: 2021-02-04

## 2021-01-05 ASSESSMENT — ENCOUNTER SYMPTOMS
RESPIRATORY NEGATIVE: 1
SORE THROAT: 0
COUGH: 0
ABDOMINAL PAIN: 0
SHORTNESS OF BREATH: 0

## 2021-01-05 NOTE — PROGRESS NOTES
[facility]  [facility] [address] [address]  Dept: [phone]     2021   Chief Complaint   Patient presents with    Weight Loss     Adipex     VIRTUAL VISIT    HPI  Sandra Brasher (:  1975) is a 54 y.o. female is an established patient of Dr. Juan Garcia. Patient has a history of Prediabetes, unable to lose weight, and severe obesity. WANTS TO LOSE WEIGHT/INABILITY TO LOSE WEIGHT    PREDIABETES  Patient's recent hemoglobin A1c below. Patient admits to genetics, poor eating habits, health problems, inactivity, medications, stress, family eating habits and portion sizes. Patient denies any polyphagia, polydipsia, polyuria. We discussed the importance lifestyle changes such as cutting down food/drinks high in carbohydrates and regular exercise to avoid any progression on this condition. We are going to continue to monitor the [facility]. Treatment metformin, Victoza. Lab Results   Component Value Date    LABA1C 5.9 2020      OBESITY  Crystal 's BMI is Body mass index is 36.43 kg/m². kg/m2. Patient's BMI is elevated. Patient's main causes of weight gain are poor eating habits, health problems, inactivity, stress, family eating habits and portion sizes. Patient have been trying to lose weight on her own without any success. So far, she  tried self dieting. 2- Strong desire present on more than half the days due to reducing carbohydrate consumption, increasing protein, eating regular meals, eliminating pop, reducing caffeine intake, reducing fast food consumption, increasing exercise, reducing sugar, using protein bars or shakes and keeping a food or exercise log. Discussed medication prescribed: ADIPEX. This condition increases the patient's risk for chronic conditions. Elida Atkinson wants to use an appetite suppressant. We discussed Adipex as a good option. She is on her last month. Original  weight 206  . Weight goal 160.  Current weight 197 lbs    Patient advised to practice healthy eating habits. Diet should include plenty of fruits, vegetables, whole grains, lean protein, and low-fat dairy. Increase water consumption. Reduce consumption of dietary sugar and sugar-sweetened beverages. Increasing physical exercises at least 3-4 times a week. In addition to the medication, patient also using diet and exercise as follows:  -diet: MD diet  -exercise:walk and gym-gonzález    Since Adipex is a controlled substance. Patient informed that when prescribed this medication for weight loss, the provider is required by law to see the patient for an appointment every thirty days. This is neccessary to record the weight and blood pressure and to assess patient's efforts to lose weight, and to ensure there are no contraindications or adverse effects. Discussed contraindications to controlled substance anorexiant: NONE ( EtOH, drug abuse, pregnancy, BMI < 27 without co morbidities, if patient did not make substantial effort to lose weight)    Patient is going to continue with this medication for the next 4 weeks. BP Readings from Last 3 Encounters:   11/03/20 120/80   07/10/20 120/88   04/02/20 122/74      Pulse Readings from Last 3 Encounters:   11/27/20 68   11/03/20 68   03/05/20 70     Wt Readings from Last 3 Encounters:   01/05/21 192 lb 11.2 oz (87.4 kg)   11/27/20 208 lb (94.3 kg)   11/03/20 206 lb (93.4 kg)     Exercise Tracking - Detailed 1/5/2021   Walking Duration -   Biking Duration -   Running Duration -   Swimming Duration -   Cardiovascular Equipment Duration -   Exercise Classes / Videos Duration -   Exercise Classes / Videos Intensity -   Exercise Classes / Videos Times/Week -   Strength Training Duration -   Other Exercise Comment home gym in between   Other Duration -   Other Intensity -   Other Times/Week -   Pedometer Steps/Day -   Total Exercise Minutes/Week -            .     No data recorded   Counseling given: Not Answered    Patient Active Problem List   Diagnosis    History of tubal ligation    Vision abnormalities    Herpes simplex type 1 infection    History of endometrial ablation    Dyslipidemia (high LDL; low HDL)    Hyperlipidemia with target LDL less than 100    Hyperglycemia    Prediabetes    Severe obesity (BMI 35.0-35.9 with comorbidity) (HCC)    History of kidney stones    Snoring    Asthma, exercise induced    History of colon polyps    Slow transit constipation    Unable to lose weight    Anxiety    Elevated BP without diagnosis of hypertension    Heart palpitations    Chronic fatigue    Hot flashes    Current mild episode of major depressive disorder without prior episode (HCC)    Abnormal EKG    Family history of premature CAD    History of uterine fibroid    Enlarged tonsils    Encounter for weight management      Past Medical History:   Diagnosis Date    Anxiety 9/20/2019    Asthma     Current mild episode of major depressive disorder without prior episode ([facility] [address] 2/13/2020    Dyslipidemia (high LDL; low HDL) 5/22/2017    History of endometrial ablation     History of kidney stones 5/22/2017    History of tubal ligation     Hyperglycemia 5/22/2017    Hypertriglyceridemia 5/22/2017    Hypomagnesemia 2/14/2020    Obesity (BMI 30-39. 9) 5/22/2017    Pre-syncope 2/13/2020    Vision abnormalities     glasses/contacts      Past Surgical History:   Procedure Laterality Date    COLONOSCOPY      COLONOSCOPY  06/28/2019    COLONOSCOPY N/A 6/28/2019    COLORECTAL CANCER SCREENING, NOT HIGH RISK performed by Zeke La MD at [address] [place] [address] Drive      LITHOTRIPSY      TUBAL LIGATION      TUBAL LIGATION      WISDOM TOOTH EXTRACTION       Family History   Problem Relation Age of Onset    Diabetes Father         possible type 1    Heart Attack Father 35    Bipolar Disorder Sister     Lung Cancer Paternal Grandmother     Heart Attack Paternal Grandfather      Current Outpatient Medications   Medication Sig Dispense Refill    phentermine (ADIPEX-P) 37.5 MG tablet Take 1 tablet by mouth every morning (before breakfast) for 30 days. 30 tablet 0    Insulin Pen Needle 31G X 5 MM MISC 1 each by Does not apply route daily TO BE USED with VICTOZA, DAILY 100 each 3    Liraglutide (VICTOZA) 18 MG/3ML SOPN SC injection Inject 1.2 mg into the skin daily 3 pen 3    pravastatin (PRAVACHOL) 40 MG tablet Take 1 tablet by mouth every evening For high cholesterol and triglycerides 90 tablet 3    fluticasone (FLONASE) 50 MCG/ACT nasal spray 2 sprays by Nasal route nightly 1 Bottle 3    metFORMIN (GLUCOPHAGE-XR) 500 MG extended release tablet Take 2 tablets by mouth daily (with breakfast) 180 tablet 3    Magnesium 400 MG CAPS Take by mouth      buPROPion (WELLBUTRIN XL) 150 MG extended release tablet Take 1 tablet by mouth every morning 30 tablet 3    albuterol sulfate HFA (PROAIR HFA) 108 (90 Base) MCG/ACT inhaler Inhale 2 puffs into the lungs every 6 hours as needed for Wheezing or Shortness of Breath (cough) 8 g 3    busPIRone (BUSPAR) 7.5 MG tablet Take 7.5 mg by mouth 2 times daily as needed for Anxiety Prescribed as BID, but patient takes PRN BID       No current facility-administered medications for this visit. Review of Systems   Constitutional: Negative for activity change, chills, fatigue, fever and unexpected weight change. HENT: Negative for sore throat. Respiratory: Negative. Negative for cough and shortness of breath. Cardiovascular: Negative. Negative for chest pain and palpitations. Gastrointestinal: Negative for abdominal pain. Endocrine: Positive for polyphagia. Genitourinary: Negative for dysuria. Musculoskeletal: Negative for arthralgias and myalgias. Skin: Negative for rash. Neurological: Negative for light-headedness and headaches. Psychiatric/Behavioral: Positive for sleep disturbance.  The patient is nervous/anxious. Prior to Visit Medications    Medication Sig Taking? Authorizing Provider   phentermine (ADIPEX-P) 37.5 MG tablet Take 1 tablet by mouth every morning (before breakfast) for 30 days. Yes Anais Robb, MATT - CNP   Insulin Pen Needle 31G X 5 MM MISC 1 each by Does not apply route daily TO BE USED with VICTOZA, DAILY  Lena Barton MD   Liraglutide (VICTOZA) 18 MG/3ML SOPN SC injection Inject 1.2 mg into the skin daily  Danial Gerardo MD   pravastatin (PRAVACHOL) 40 MG tablet Take 1 tablet by mouth every evening For high cholesterol and triglycerides  Lena Barton MD   fluticasone (FLONASE) 50 MCG/ACT nasal spray 2 sprays by Nasal route nightly  Danial Gerardo MD   metFORMIN (GLUCOPHAGE-XR) 500 MG extended release tablet Take 2 tablets by mouth daily (with breakfast)  Danial Gerardo MD   Magnesium 400 MG CAPS Take by mouth  Historical Provider, MD   buPROPion (WELLBUTRIN XL) 150 MG extended release tablet Take 1 tablet by mouth every morning  Lena Barton MD   albuterol sulfate HFA (PROAIR HFA) 108 (90 Base) MCG/ACT inhaler Inhale 2 puffs into the lungs every 6 hours as needed for Wheezing or Shortness of Breath (cough)  Danial Gerardo MD   busPIRone (BUSPAR) 7.5 MG tablet Take 7.5 mg by mouth 2 times daily as needed for Anxiety Prescribed as BID, but patient takes PRN BID  Historical Provider, MD      Social History     Tobacco Use    Smoking status: Never Smoker    Smokeless tobacco: Never Used   Substance Use Topics    Alcohol use: Yes     Alcohol/week: 0.0 standard drinks     Comment: Socially      Vitals:    01/05/21 0709   Weight: 192 lb 11.2 oz (87.4 kg)   Height: 5' 0.98\" (1.549 m)     Estimated body mass index is 36.43 kg/m² as calculated from the following:    Height as of this encounter: 5' 0.98\" (1.549 m). Weight as of this encounter: 192 lb 11.2 oz (87.4 kg). Physical Exam  Constitutional:       Appearance: Normal appearance.  She is obese. HENT:      Head: Normocephalic. Right Ear: External ear normal.      Left Ear: External ear normal.   Neck:      Musculoskeletal: Normal range of motion. Pulmonary:      Effort: Pulmonary effort is normal.   Neurological:      Mental Status: She is oriented to person, place, and time. Psychiatric:         Mood and Affect: Mood normal.         Behavior: Behavior normal.         Thought Content: Thought content normal.         Judgment: Judgment normal.       Most recent labs reviewed with patient, and all questions answered. Lab Results   Component Value Date    WBC 5.3 02/14/2020    HGB 12.7 02/14/2020    HCT 39.6 02/14/2020    MCV 93.8 02/14/2020     02/14/2020     Lab Results   Component Value Date     02/14/2020    K 4.1 02/14/2020    CL 99 02/14/2020    CO2 23 02/14/2020    BUN 7 02/14/2020    CREATININE 0.72 02/14/2020    GLUCOSE 94 02/14/2020    GLUCOSE 90 12/16/2011    CALCIUM 9.7 02/14/2020      Lab Results   Component Value Date    ALT 13 02/14/2020    AST 12 02/14/2020    ALKPHOS 62 02/14/2020    BILITOT 0.38 02/14/2020     Lab Results   Component Value Date    TSH 1.47 02/14/2020     Lab Results   Component Value Date    CHOL 227 (H) 02/14/2020    CHOL 194 11/03/2018    CHOL 226 (H) 05/22/2017     Lab Results   Component Value Date    TRIG 262 (H) 02/14/2020    TRIG 156 (H) 11/03/2018    TRIG 246 (H) 05/22/2017     Lab Results   Component Value Date    HDL 42 02/14/2020    HDL 42 11/03/2018    HDL 49 05/22/2017     Lab Results   Component Value Date    LDLCHOLESTEROL 133 (H) 02/14/2020    LDLCHOLESTEROL 121 11/03/2018    LDLCHOLESTEROL 128 05/22/2017     Lab Results   Component Value Date    CHOLHDLRATIO 5.4 (H) 02/14/2020    CHOLHDLRATIO 4.6 11/03/2018    CHOLHDLRATIO 4.6 05/22/2017     Lab Results   Component Value Date    LABA1C 5.9 11/03/2020        Lab Results   Component Value Date    VITD25 34.0 04/06/2013     ASSESSMENT/PLAN:  1.  Prediabetes  Stable  DISCUSSED and ADVISED TO:  Decrease carbohydrates, sugary drinks, desserts   Exercise regularly, as tolerated. Try to lose weight. - phentermine (ADIPEX-P) 37.5 MG tablet; Take 1 tablet by mouth every morning (before breakfast) for 30 days. Dispense: 30 tablet; Refill: 0    2. Class 2 severe obesity due to excess calories with serious comorbidity and body mass index (BMI) of 36.0 to 36.9 in adult [facility])  Improving  BMI decreasing  DISCUSSED AND ADVISED TO:  Eat a low-fat and low carbohydrates diet. Avoid fried foods especially fast food. Choose healthier options for snacks. Have 5-6 servings of fruits and vegetables per day. Cut down on eating processed food. Add 30 minutes to 1 hour aerobic exercise for 3-4 days a week. - phentermine (ADIPEX-P) 37.5 MG tablet; Take 1 tablet by mouth every morning (before breakfast) for 30 days. Dispense: 30 tablet; Refill: 0    3. Unable to lose weight  Improving  Change Exercise routine  Plant based diet    - phentermine (ADIPEX-P) 37.5 MG tablet; Take 1 tablet by mouth every morning (before breakfast) for 30 days. Dispense: 30 tablet; Refill: 0    4. Encounter for weight management  Improving  Continue Adipex    - phentermine (ADIPEX-P) 37.5 MG tablet; Take 1 tablet by mouth every morning (before breakfast) for 30 days. Dispense: 30 tablet; Refill: 0    Controlled Substance Monitoring:  Acute and Chronic Pain Monitoring:   RX Monitoring 12/27/2020   Attestation The Prescription Monitoring Report for this patient was reviewed today. Periodic Controlled Substance Monitoring No signs of potential drug abuse or diversion identified. Chronic Pain > 50 MEDD -     No orders of the defined types were placed in this encounter. Orders Placed This Encounter   Medications    phentermine (ADIPEX-P) 37.5 MG tablet     Sig: Take 1 tablet by mouth every morning (before breakfast) for 30 days. Dispense:  30 tablet     Refill:  0      There are no discontinued medications.    There are no preventive care reminders to display for this patient. Return in about 4 weeks (around 2/2/2021) for Adipex done, weight mgmt. Celia received counseling on the following healthy behaviors: nutrition, exercise and medication adherence  Reviewed prior labs and health maintenance  Continue current medications, diet and exercise. Discussed use, benefit, and side effects of prescribed medications. Barriers to medication compliance addressed. Patient given educational materials - see patient instructions  Was a self-tracking handout given in paper form or via [employer]? Yes    Requested Prescriptions     Signed Prescriptions Disp Refills    phentermine (ADIPEX-P) 37.5 MG tablet 30 tablet 0     Sig: Take 1 tablet by mouth every morning (before breakfast) for 30 days. All patient questions answered. Patient voiced understanding. Quality Measures    Body mass index is 36.43 kg/m². Elevated. Weight control planned discussed Healthy diet and regular exercise. Blood pressure is normal. Treatment plan consists of No treatment change needed. Lab Results   Component Value Date    [id number] 393 (H) 02/14/2020    (goal LDL reduction with dx if diabetes is 50% LDL reduction)      PHQ Scores 7/10/2020 5/21/2020 2/13/2020 2/8/2019 9/21/2018 3/14/2018 5/22/2017   PHQ2 Score 0 0 3 0 0 0 0   PHQ9 Score 0 0 8 0 0 0 0     Interpretation of Total Score Depression Severity: 1-4 = Minimal depression, 5-9 = Mild depression, 10-14 = Moderate depression, 15-19 = Moderately severe depression, 20-27 = Severe depression   This note was completed by using the assistance of a speech-recognition program. However, inadvertent computerized transcription errors may be present. Although every effort was made to ensure accuracy, no guarantees can be provided that every mistake has been identified and corrected by editing   An electronic signature was used to authenticate this note.   --Anais Robb, APRN - CNP on 1/5/2021 at 7:23 AM

## 2021-01-05 NOTE — PATIENT INSTRUCTIONS
Patient Education        Learning About Physical Activity  What is physical activity? Physical activity is any kind of activity that gets your body moving. The types of physical activity that can help you get fit and stay healthy include:  · Aerobic or \"cardio\" activities that make your heart beat faster and make you breathe harder, such as brisk walking, riding a bike, or running. Aerobic activities strengthen your heart and lungs and build up your endurance. · Strength training activities that make your muscles work against, or \"resist,\" something, such as lifting weights or doing push-ups. These activities help tone and strengthen your muscles. · Stretches that allow you to move your joints and muscles through their full range of motion. Stretching helps you be more flexible and avoid injury. What are the benefits of physical activity? Being active is one of the best things you can do for your health. It helps you to:  · Feel stronger and have more energy to do all the things you like to do. · Focus better at school or work. · Feel, think, and sleep better. · Reach and stay at a healthy weight. · Lose fat and build lean muscle. · Lower your risk for serious health problems. · Keep your bones, muscles, and joints strong. How can you make physical activity part of your life? Get at least 30 minutes of exercise on most days of the week. Walking is a good choice. You also may want to do other activities, such as running, swimming, cycling, or playing tennis or team sports. Pick activities that you like--ones that make your heart beat faster, your muscles stronger, and your muscles and joints more flexible. If you find more than one thing you like doing, do them all. You don't have to do the same thing every day. Get your heart pumping every day. Any activity that makes your heart beat faster and keeps it at that rate for a while counts.   Here are some great ways to get your heart beating faster:  · Go for a brisk walk, run, or bike ride. · Go for a hike or swim. · Go in-line skating. · Play a game of touch football, basketball, or soccer. · Ride a bike. · Play tennis or racquetball. · Climb stairs. Even some household chores can be aerobic--just do them at a faster pace. Vacuuming, raking or mowing the lawn, sweeping the garage, and washing and waxing the car all can help get your heart rate up. Strengthen your muscles during the week. You don't have to lift heavy weights or grow big, bulky muscles to get stronger. Doing a few simple activities that make your muscles work against, or \"resist,\" something can help you get stronger. For example, you can:  · Do push-ups or sit-ups, which use your own body weight as resistance. · Lift weights or dumbbells or use stretch bands at home or in a gym or community center. Stretch your muscles often. Stretching will help you as you become more active. It can help you stay flexible, loosen tight muscles, and avoid injury. It can also help improve your balance and posture and can be a great way to relax. Be sure to stretch the muscles you'll be using when you work out. It's best to warm your muscles slightly before you stretch them. Walk or do some other light aerobic activity for a few minutes, and then start stretching. When you stretch your muscles:  · Do it slowly. Stretching is not about going fast or making sudden movements. · Don't push or bounce during a stretch. · Hold each stretch for at least 15 to 30 seconds, if you can. You should feel a stretch in the muscle, but not pain. · Breathe out as you do the stretch. Then breathe in as you hold the stretch. Don't hold your breath. If you're worried about how more activity might affect your health, have a checkup before you start. Follow any special advice your doctor gives you for getting a smart start. Where can you learn more? Go to https://genia.[facility]. org and sign in to your [employer] account. Enter P465 in the [facility] box to learn more about \"Learning About Physical Activity. \"     If you do not have an account, please click on the \"Sign Up Now\" link. Current as of: January 16, 2020               Content Version: 12.6  © 5557-9583 [employer], [employer]. Care instructions adapted under license by [facility] ([facility]). If you have questions about a medical condition or this instruction, always ask your healthcare professional. [place] [address] any warranty or liability for your use of this information. Patient Education        Learning About Mindfulness for Stress  What are mindfulness and stress? Stress is what you feel when you have to handle more than you are used to. A lot of things can cause stress. You may feel stress when you go on a job interview, take a test, or run a race. This kind of short-term stress is normal and even useful. It can help you if you need to work hard or react quickly. Stress also can last a long time. Long-term stress is caused by stressful situations or events. Examples of long-term stress include long-term health problems, ongoing problems at work, and conflicts in your family. Long-term stress can harm your health. Mindfulness is a focus only on things happening in the present moment. It's a process of purposefully paying attention to and being aware of your surroundings, your emotions, your thoughts, and how your body feels. You are aware of these things, but you aren't judging these experiences as \"good\" or \"bad. \" Mindfulness can help you learn to calm your mind and body to help you cope with illness, pain, and stress. How does mindfulness help to relieve stress? Mindfulness can help quiet your mind and relax your body. Studies show that it can help some people sleep better, feel less anxious, and bring their blood pressure down.  And it's been shown to help some people live and cope better with certain health problems like heart disease, depression, chronic pain, and cancer. How do you practice mindfulness? To be mindful is to pay attention, to be present, and to be accepting. · When you're mindful, you do just one thing and you pay close attention to that one thing. For example, you may sit quietly and notice your emotions or how your food tastes and smells. · When you're present, you focus on the things that are happening right now. You let go of your thoughts about the past and the future. When you dwell on the past or the future, you miss moments that can heal and strengthen you. You may miss moments like hearing a child laugh or seeing a friendly face when you think you're all alone. · When you're accepting, you don't  the present moment. Instead you accept your thoughts and feelings as they come. You can practice anytime, anywhere, and in any way you choose. You can practice in many ways. Here are a few ideas:  · While doing your chores, like washing the dishes, let your mind focus on what's in your hand. What does the dish feel like? Is the water warm or cold? · Go outside and take a few deep breaths. What is the air like? Is it warm or cold? · When you can, take some time at the start of your day to sit alone and think. · Take a slow walk by yourself. Count your steps while you breathe in and out. · Try yoga breathing exercises, stretches, and poses to strengthen and relax your muscles. · At work, if you can, try to stop for a few moments each hour. Note how your body feels. Let yourself regroup and let your mind settle before you return to what you were doing. · If you struggle with anxiety or \"worry thoughts,\" imagine your mind as a blue gertrudis and your worry thoughts as clouds. Now imagine those worry thoughts floating across your mind's gertrudis. Just let them pass by as you watch. Follow-up care is a key part of your treatment and safety.  Be sure to make and go to all appointments, and call your doctor if you are having problems. It's also a good idea to know your test results and keep a list of the medicines you take. Where can you learn more? Go to https://[facility].[employer]. org and sign in to your [employer] account. Enter V357 in the [employer] box to learn more about \"Learning About Mindfulness for Stress. \"     If you do not have an account, please click on the \"Sign Up Now\" link. Current as of: December 16, 2019               Content Version: 12.6  © 1549-7268 [employer]. Care instructions adapted under license by [facility] ([facility]). If you have questions about a medical condition or this instruction, always ask your healthcare professional. [address] any warranty or liability for your use of this information.

## 2021-01-13 ENCOUNTER — HOSPITAL ENCOUNTER (OUTPATIENT)
Dept: LAB | Age: 46
Setting detail: SPECIMEN
Discharge: HOME | End: 2021-01-13
Payer: COMMERCIAL

## 2021-01-13 DIAGNOSIS — Z01.818 PREOP TESTING: Primary | ICD-10-CM

## 2021-01-13 PROCEDURE — U0003 INFECTIOUS AGENT DETECTION BY NUCLEIC ACID (DNA OR RNA); SEVERE ACUTE RESPIRATORY SYNDROME CORONAVIRUS 2 (SARS-COV-2) (CORONAVIRUS DISEASE [COVID-19]), AMPLIFIED PROBE TECHNIQUE, MAKING USE OF HIGH THROUGHPUT TECHNOLOGIES AS DESCRIBED BY CMS-2020-01-R: HCPCS

## 2021-01-13 PROCEDURE — U0005 INFEC AGEN DETEC AMPLI PROBE: HCPCS

## 2021-01-14 LAB
SARS-COV-2, RAPID: NORMAL
SARS-COV-2: NORMAL
SARS-COV-2: NOT DETECTED
SOURCE: NORMAL

## 2021-01-17 ENCOUNTER — HOSPITAL ENCOUNTER (OUTPATIENT)
Dept: SLEEP CENTER | Age: 46
Discharge: HOME | End: 2021-01-19
Payer: COMMERCIAL

## 2021-01-17 DIAGNOSIS — G47.33 OSA (OBSTRUCTIVE SLEEP APNEA): ICD-10-CM

## 2021-01-17 DIAGNOSIS — R06.83 SNORING: ICD-10-CM

## 2021-01-17 PROCEDURE — 95811 POLYSOM 6/>YRS CPAP 4/> PARM: CPT

## 2021-01-18 VITALS — WEIGHT: 192 LBS | BODY MASS INDEX: 36.25 KG/M2 | TEMPERATURE: 98.6 F | HEIGHT: 61 IN

## 2021-01-29 LAB — STATUS: NORMAL

## 2021-01-29 NOTE — RESULT ENCOUNTER NOTE
Please ensure ff up with pulmonology for her cpap management. Please share Dr. Morton Juliann contact info. Thanks.

## 2021-02-05 ENCOUNTER — OFFICE VISIT (OUTPATIENT)
Dept: FAMILY MEDICINE CLINIC | Age: 46
End: 2021-02-05
Payer: COMMERCIAL

## 2021-02-05 VITALS
HEART RATE: 95 BPM | DIASTOLIC BLOOD PRESSURE: 80 MMHG | OXYGEN SATURATION: 98 % | HEIGHT: 61 IN | SYSTOLIC BLOOD PRESSURE: 130 MMHG | TEMPERATURE: 97 F | BODY MASS INDEX: 36.63 KG/M2 | WEIGHT: 194 LBS

## 2021-02-05 DIAGNOSIS — B37.0 THRUSH, ORAL: ICD-10-CM

## 2021-02-05 DIAGNOSIS — Z51.81 MEDICATION MONITORING ENCOUNTER: ICD-10-CM

## 2021-02-05 DIAGNOSIS — I73.00 RAYNAUD'S DISEASE WITHOUT GANGRENE: ICD-10-CM

## 2021-02-05 DIAGNOSIS — E66.01 SEVERE OBESITY (BMI 35.0-35.9 WITH COMORBIDITY) (HCC): Primary | ICD-10-CM

## 2021-02-05 DIAGNOSIS — E78.5 HYPERLIPIDEMIA WITH TARGET LDL LESS THAN 100: ICD-10-CM

## 2021-02-05 DIAGNOSIS — R73.03 PREDIABETES: ICD-10-CM

## 2021-02-05 PROCEDURE — 99214 OFFICE O/P EST MOD 30 MIN: CPT | Performed by: FAMILY MEDICINE

## 2021-02-05 RX ORDER — SEMAGLUTIDE 1.34 MG/ML
0.25 INJECTION, SOLUTION SUBCUTANEOUS WEEKLY
Qty: 2 PEN | Refills: 0 | Status: SHIPPED | OUTPATIENT
Start: 2021-02-05 | End: 2021-03-22 | Stop reason: SDUPTHER

## 2021-02-05 RX ORDER — AMLODIPINE BESYLATE 2.5 MG/1
2.5 TABLET ORAL DAILY
Qty: 90 TABLET | Refills: 1 | Status: SHIPPED
Start: 2021-02-05 | End: 2021-08-05

## 2021-02-05 ASSESSMENT — ENCOUNTER SYMPTOMS
CONSTIPATION: 0
CHEST TIGHTNESS: 0
NAUSEA: 0
SHORTNESS OF BREATH: 0
VOMITING: 0
ABDOMINAL DISTENTION: 0
WHEEZING: 0
DIARRHEA: 0
ABDOMINAL PAIN: 0
COUGH: 0

## 2021-02-05 ASSESSMENT — PATIENT HEALTH QUESTIONNAIRE - PHQ9
SUM OF ALL RESPONSES TO PHQ QUESTIONS 1-9: 0
1. LITTLE INTEREST OR PLEASURE IN DOING THINGS: 0
SUM OF ALL RESPONSES TO PHQ9 QUESTIONS 1 & 2: 0
SUM OF ALL RESPONSES TO PHQ QUESTIONS 1-9: 0

## 2021-02-05 NOTE — PROGRESS NOTES
Glynn Leone (:  1975) is a 54 y.o. female,Established patient, here for evaluation of the following chief complaint(s): Weight Management (adipex#3) and Other (cold feet)      ASSESSMENT/PLAN:    1. Severe obesity (BMI 35.0-35.9 with comorbidity) ([facility] [address]  Improved  Patient was asked about her current diet and exercise habits, and personalized advice was provided regarding recommended lifestyle changes. Patient's comorbid health conditions associated with elevated BMI were discussed, including dyslipidemia, hyperglycemia and mood disorder, as well as the likely benefits of weight loss. Based upon patient's motivation to change her behavior, the following plan was agreed upon to work toward a weight loss goal of 1-2 pounds/week: low carbohydrate diet, exercise for at least 30 minutes 4-5 days per week and medication prescribed: Metformin, Ozempic, Adipex. Educational materials for  weight loss were provided. Patient will follow-up in 6 month(s) with PCP. Provider spent  10 minutes counseling patient.    -     CBC; Future  -     Comprehensive Metabolic Panel; Future  -     TSH without Reflex; Future  2. Hyperlipidemia with target LDL less than 100  worsening per prior lipid profile  continue Pravachol   -     Lipid, Fasting; Future  3. Prediabetes  Improving  Continue Metformin   -start     Semaglutide,0.25 or 0.5MG/DOS, (OZEMPIC, 0.25 OR 0.5 MG/DOSE,) 2 MG/1.5ML SOPN; Inject 0.25 mg into the skin once a week Call for refills or dose adjustment. STOP VICTOZA., Disp-2 pen, R-0Normal  -     Hemoglobin A1C; Future  -     Vitamin B12 & Folate; Future  4. Raynaud's disease without gangrene  Failing to change as expected. - start    amLODIPine (NORVASC) 2.5 MG tablet; Take 1 tablet by mouth daily, Disp-90 tablet, R-1Normal  5. Thrush, oral  Failing to change as expected. -start     nystatin (MYCOSTATIN) 785549 UNIT/ML suspension;  Take 5 mLs by mouth 4 times daily Swish and swallow, Oral, 4 TIMES DAILY Starting Fri 2/5/2021, Disp-240 mL, R-0, Normal  6. Medication monitoring encounter  -     Vitamin B12 & Folate; Future      Crystal received counseling on the following healthy behaviors: nutrition, exercise, medication adherence and weight loss    Reviewed prior labs and health maintenance  Discussed use, benefit, and side effects of prescribed medications. Barriers to medication compliance addressed. Patient given educational materials - see patient instructions  All patient questions answered. Patient voiced understanding. The patient's past medical,surgical, social, and family history as well as her current medications and allergies were reviewed as documented in today's encounter. Medications, labs, diagnostic studies, consultations and follow-up as documented in this encounter. Return in about 6 months (around 8/5/2021) for Face-2F-30mins PHYSICAL, VISION screen, PHQ9. .    Data Unavailable      SUBJECTIVE/OBJECTIVE:    HPI        Wants to lose weight. Received Adipex 3, now still having 2 more weeks from the last refill. In addition to the medication, patient also using diet and exercise as follows:  -diet: low carb diet and more fruits and vegetables   -exercise: has GYM in the basement  Still has Adipex has for 2 weeks, she is also on Metformin and Victoza. Wants to change to Ozempic which wasn't covered last time. Medication side effects: None. Patient denies anorexia, nausea, vomiting, abdominal pain, involuntary weight loss, palpitations, insomnia, irritability, anxiety, headache and tremor. Patient informed that when prescribed a controlled substance for weight loss, the provider is required by law to see the patient for an appointment every thirty days. This is neccessary to record the weight and blood pressure and to assess patient's efforts to lose weight, and to ensure there are no contraindications or adverse effects.        Blood pressure is Normal.  BP Readings from Last 3 Encounters:   02/05/21 130/80   11/03/20 120/80   07/10/20 120/88        Pulse is Normal.     Pulse Readings from Last 3 Encounters:   02/05/21 95   11/27/20 68   11/03/20 68       Weight has been decreasing. Patient intentionally lost 12 in 3 months, but she wants to lose more weight  Wt Readings from Last 3 Encounters:   02/05/21 194 lb (88 kg)   01/18/21 192 lb (87.1 kg)   01/05/21 192 lb 11.2 oz (87.4 kg)     206 lbs 3 months ago per last note  02/13/20 206 lb (93.4 kg)           Exercise Tracking - Detailed 2/5/2021   Walking Duration -   Biking Duration -   Running Duration -   Swimming Duration -   Cardiovascular Equipment Duration -   Exercise Classes / Videos Duration 30   Exercise Classes / Videos Intensity Moderate   Exercise Classes / Videos Times/Week 3   Strength Training Duration -   Other Exercise Comment -   Other Duration -   Other Intensity -   Other Times/Week -   Pedometer Steps/Day -   Total Exercise Minutes/Week 90       Hyperlipidemia:  No new myalgias or GI upset on pravastatin (Pravachol). Medication compliance: noncompliant: on and off. Patient is  following a low fat, low cholesterol diet. LDL is HIGH  Lab Results   Component Value Date    LDLCHOLESTEROL 133 (H) 02/14/2020     Lab Results   Component Value Date    TRIG 262 (H) 02/14/2020    TRIG 156 (H) 11/03/2018    TRIG 246 (H) 05/22/2017       Prediabetes  Taking Metformin and Victoza, Tolerates medications well, denies side effects. A1c is improving  Denies increased appetite, thirst or polyuria. Lab Results   Component Value Date    LABA1C 5.9 11/03/2020    LABA1C 6.0 02/14/2020    LABA1C 5.8 08/23/2019       Tongue itchy and white coating on it, not getting better. Has been brushing it a lot, but not getting better. Crystal complains of Cold toes, turning blue sometimes. It's getting worse since the cold weather started        [x]Negative depression screening.     PHQ Scores 2/5/2021 7/10/2020 5/21/2020 2/13/2020 2/8/2019 9/21/2018 3/14/2018   PHQ2 Score 0 0 0 3 0 0 0   PHQ9 Score 0 0 0 8 0 0 0         Prior to Visit Medications    Medication Sig Taking? Authorizing Provider   Insulin Pen Needle 31G X 5 MM MISC 1 each by Does not apply route daily TO BE USED with VICTOZA, DAILY Yes Ryan Dial MD   Liraglutide (VICTOZA) 18 MG/3ML SOPN SC injection Inject 1.2 mg into the skin daily Yes Ryan Dial MD   pravastatin (PRAVACHOL) 40 MG tablet Take 1 tablet by mouth every evening For high cholesterol and triglycerides Yes Ryan Dial MD   fluticasone (FLONASE) 50 MCG/ACT nasal spray 2 sprays by Nasal route nightly Yes Ryan Dial MD   metFORMIN (GLUCOPHAGE-XR) 500 MG extended release tablet Take 2 tablets by mouth daily (with breakfast) Yes Ryan Dial MD   Magnesium 400 MG CAPS Take by mouth Yes Historical Provider, MD   buPROPion (WELLBUTRIN XL) 150 MG extended release tablet Take 1 tablet by mouth every morning Yes Ryan Dial MD   albuterol sulfate HFA (PROAIR HFA) 108 (90 Base) MCG/ACT inhaler Inhale 2 puffs into the lungs every 6 hours as needed for Wheezing or Shortness of Breath (cough) Yes Ryan Dial MD   busPIRone (BUSPAR) 7.5 MG tablet Take 7.5 mg by mouth 2 times daily as needed for Anxiety Prescribed as BID, but patient takes PRN BID Yes Historical Provider, MD       Social History     Tobacco Use    Smoking status: Never Smoker    Smokeless tobacco: Never Used   Substance Use Topics    Alcohol use: Yes     Alcohol/week: 0.0 standard drinks     Comment: Socially    Drug use: No         Review of Systems   Constitutional: Positive for fatigue (better). Negative for activity change, appetite change, chills, diaphoresis, fever and unexpected weight change. Respiratory: Negative for cough, chest tightness, shortness of breath and wheezing. Cardiovascular: Negative for chest pain, palpitations and leg swelling.    Gastrointestinal: Negative for abdominal Value Date    HDL 42 02/14/2020    HDL 42 11/03/2018    HDL 49 05/22/2017     Lab Results   Component Value Date    LDLCHOLESTEROL 133 (H) 02/14/2020    LDLCHOLESTEROL 121 11/03/2018    LDLCHOLESTEROL 128 05/22/2017     Lab Results   Component Value Date    CHOLHDLRATIO 5.4 (H) 02/14/2020    CHOLHDLRATIO 4.6 11/03/2018    CHOLHDLRATIO 4.6 05/22/2017       Lab Results   Component Value Date    LABA1C 5.9 11/03/2020    LABA1C 6.0 02/14/2020    LABA1C 5.8 08/23/2019       Orders Placed This Encounter   Medications    Semaglutide,0.25 or 0.5MG/DOS, (OZEMPIC, 0.25 OR 0.5 MG/DOSE,) 2 MG/1.5ML SOPN     Sig: Inject 0.25 mg into the skin once a week Call for refills or dose adjustment. STOP VICTOZA. Dispense:  2 pen     Refill:  0    nystatin (MYCOSTATIN) [id number] UNIT/ML suspension     Sig: Take 5 mLs by mouth 4 times daily Swish and swallow     Dispense:  240 mL     Refill:  0    amLODIPine (NORVASC) 2.5 MG tablet     Sig: Take 1 tablet by mouth daily     Dispense:  90 tablet     Refill:  1       Medications Discontinued During This Encounter   Medication Reason    buPROPion (WELLBUTRIN XL) 150 MG extended release tablet Patient Choice    Liraglutide (VICTOZA) 18 MG/3ML SOPN SC injection Alternate therapy         Return in about 6 months (around 8/5/2021) for Face-2F-30mins PHYSICAL, VISION screen, PHQ9. .    On this date 02/05/21 I have spent 30 minutes reviewing previous notes, test results and face to face with the patient discussing the diagnosis and importance of compliance with the treatment plan as well as documenting on the day of the visit. Future Appointments   Date Time Provider Diamond Gonzalez   8/5/2021  2:45 PM Emily Olmedo MD fp sc [facility]       This note was completed by using the assistance of a speech-recognition program. However, inadvertent computerized transcription errors may be present.  Although every effort was made to ensure accuracy, no guarantees can be provided that every mistake has been identified and corrected by editing. An electronic signature was used to authenticate this note.   Electronically signed by Jeni Jacobson MD on 2/6/2021  10:36 PM

## 2021-02-05 NOTE — PROGRESS NOTES
Visit Information    Have you changed or started any medications since your last visit including any over-the-counter medicines, vitamins, or herbal medicines? no   Are you having any side effects from any of your medications? -  no  Have you stopped taking any of your medications? Is so, why? -  no    Have you seen any other physician or provider since your last visit? No  Have you had any other diagnostic tests since your last visit? No  Have you been seen in the emergency room and/or had an admission to a hospital since we last saw you? No  Have you had your routine dental cleaning in the past 6 months? yes     Have you activated your [employer] account? If not, what are your barriers?  Yes     Patient Care Team:  Lincoln Cordero MD as PCP - General (Family Medicine)  Lincoln Cordero MD as PCP - [facility] Provider  MATT Duff CNP as Nurse Practitioner (Certified Nurse Practitioner)  Chelsy Brown MD as Consulting Physician (Urology)  MATT Poole CNP (Cardiology)  Nellie Gonzalez MD as Consulting Physician (Cardiology)    Medical History Review  Past Medical, Family, and Social History reviewed and does contribute to the patient presenting condition    Health Maintenance   Topic Date Due    Lipid screen  02/14/2021    DTaP/Tdap/Td vaccine (1 - Tdap) 07/03/2022 (Originally 2/22/1994)    A1C test (Diabetic or Prediabetic)  11/03/2021    Cervical cancer screen  07/10/2025    Colon cancer screen colonoscopy  06/28/2029    Flu vaccine  Completed    Pneumococcal 0-64 years Vaccine  Completed    Hepatitis C screen  Completed    HIV screen  Completed    Hepatitis A vaccine  Aged Out    Hepatitis B vaccine  Aged Out    Hib vaccine  Aged Out    Meningococcal (ACWY) vaccine  Aged Out

## 2021-02-06 PROBLEM — R63.8 UNABLE TO LOSE WEIGHT: Status: RESOLVED | Noted: 2019-09-20 | Resolved: 2021-02-06

## 2021-02-06 PROBLEM — R03.0 ELEVATED BP WITHOUT DIAGNOSIS OF HYPERTENSION: Status: RESOLVED | Noted: 2019-09-20 | Resolved: 2021-02-06

## 2021-02-06 PROBLEM — E78.5 DYSLIPIDEMIA (HIGH LDL; LOW HDL): Status: RESOLVED | Noted: 2017-05-22 | Resolved: 2021-02-06

## 2021-02-06 PROBLEM — Z76.89 ENCOUNTER FOR WEIGHT MANAGEMENT: Status: RESOLVED | Noted: 2020-12-01 | Resolved: 2021-02-06

## 2021-02-06 ASSESSMENT — ENCOUNTER SYMPTOMS: COLOR CHANGE: 1

## 2021-03-19 ENCOUNTER — PATIENT MESSAGE (OUTPATIENT)
Dept: FAMILY MEDICINE CLINIC | Age: 46
End: 2021-03-19

## 2021-03-19 DIAGNOSIS — R73.03 PREDIABETES: ICD-10-CM

## 2021-03-22 RX ORDER — SEMAGLUTIDE 1.34 MG/ML
0.25 INJECTION, SOLUTION SUBCUTANEOUS WEEKLY
Qty: 2 PEN | Refills: 0 | Status: SHIPPED
Start: 2021-03-22 | End: 2021-06-14 | Stop reason: DRUGHIGH

## 2021-06-03 ENCOUNTER — HOSPITAL ENCOUNTER (EMERGENCY)
Age: 46
Discharge: HOME | End: 2021-06-03
Attending: EMERGENCY MEDICINE
Payer: COMMERCIAL

## 2021-06-03 VITALS
SYSTOLIC BLOOD PRESSURE: 140 MMHG | HEART RATE: 67 BPM | DIASTOLIC BLOOD PRESSURE: 95 MMHG | WEIGHT: 193.7 LBS | RESPIRATION RATE: 15 BRPM | HEIGHT: 61 IN | BODY MASS INDEX: 36.57 KG/M2 | OXYGEN SATURATION: 98 %

## 2021-06-03 DIAGNOSIS — H81.10 BENIGN PAROXYSMAL POSITIONAL VERTIGO, UNSPECIFIED LATERALITY: Primary | ICD-10-CM

## 2021-06-03 LAB
EKG ATRIAL RATE: 68 BPM
EKG P AXIS: 11 DEGREES
EKG P-R INTERVAL: 162 MS
EKG Q-T INTERVAL: 416 MS
EKG QRS DURATION: 74 MS
EKG QTC CALCULATION (BAZETT): 442 MS
EKG R AXIS: 1 DEGREES
EKG T AXIS: 9 DEGREES
EKG VENTRICULAR RATE: 68 BPM

## 2021-06-03 PROCEDURE — 6370000000 HC RX 637 (ALT 250 FOR IP): Performed by: EMERGENCY MEDICINE

## 2021-06-03 PROCEDURE — 99283 EMERGENCY DEPT VISIT LOW MDM: CPT

## 2021-06-03 PROCEDURE — 93005 ELECTROCARDIOGRAM TRACING: CPT | Performed by: EMERGENCY MEDICINE

## 2021-06-03 PROCEDURE — 93010 ELECTROCARDIOGRAM REPORT: CPT | Performed by: INTERNAL MEDICINE

## 2021-06-03 RX ORDER — IBUPROFEN 800 MG/1
800 TABLET ORAL ONCE
Status: COMPLETED | OUTPATIENT
Start: 2021-06-03 | End: 2021-06-03

## 2021-06-03 RX ORDER — METOCLOPRAMIDE 10 MG/1
10 TABLET ORAL ONCE
Status: COMPLETED | OUTPATIENT
Start: 2021-06-03 | End: 2021-06-03

## 2021-06-03 RX ORDER — ACETAMINOPHEN 500 MG
1000 TABLET ORAL ONCE
Status: COMPLETED | OUTPATIENT
Start: 2021-06-03 | End: 2021-06-03

## 2021-06-03 RX ORDER — MECLIZINE HYDROCHLORIDE 25 MG/1
25 TABLET ORAL 3 TIMES DAILY PRN
Qty: 10 TABLET | Refills: 0 | Status: SHIPPED | OUTPATIENT
Start: 2021-06-03 | End: 2021-06-13

## 2021-06-03 RX ADMIN — ACETAMINOPHEN 1000 MG: 500 TABLET ORAL at 01:44

## 2021-06-03 RX ADMIN — IBUPROFEN 800 MG: 800 TABLET, FILM COATED ORAL at 01:45

## 2021-06-03 RX ADMIN — METOCLOPRAMIDE 10 MG: 10 TABLET ORAL at 01:45

## 2021-06-03 ASSESSMENT — PAIN SCALES - GENERAL: PAINLEVEL_OUTOF10: 1

## 2021-06-03 NOTE — ED PROVIDER NOTES
EMERGENCY DEPARTMENT ENCOUNTER    Pt Name: Ismael Wetzel  MRN: [id number]  [place] 1975  Date of evaluation: 6/3/21  CHIEF COMPLAINT       Chief Complaint   Patient presents with    Dizziness     HISTORY OF PRESENT ILLNESS     Patient is a 70-year-old female with PMH of ocular migraines who presents the ED for evaluation of lightheadedness. Patient sat down her bed, felt lightheaded, subsequent laid flat in her bed and developed spinning dizziness. Symptoms improved after a few minutes. However symptoms have not completely resolved and she feels \"fullness \" in her head. She does report having an ocular migraine earlier today. No recent URI, no ear pain, no tinnitus. No nasal congestion, rhinorrhea. No other issues at this time. No fevers, cough, chest pain, abdominal pain. No slurred speech, facial droop, weakness. REVIEW OF SYSTEMS     Review of Systems   All other systems reviewed and are negative. PASTMEDICAL HISTORY     Past Medical History:   Diagnosis Date    Anxiety 9/20/2019    Asthma     Current mild episode of major depressive disorder without prior episode ([facility] [address] 2/13/2020    Dyslipidemia (high LDL; low HDL) 5/22/2017    History of endometrial ablation     History of kidney stones 5/22/2017    History of tubal ligation     Hyperglycemia 5/22/2017    Hypertriglyceridemia 5/22/2017    Hypomagnesemia 2/14/2020    Obesity (BMI 30-39. 9) 5/22/2017    Pre-syncope 2/13/2020    Vision abnormalities     glasses/contacts     SURGICAL HISTORY       Past Surgical History:   Procedure Laterality Date    COLONOSCOPY      COLONOSCOPY  06/28/2019    COLONOSCOPY N/A 6/28/2019    COLORECTAL CANCER SCREENING, NOT HIGH RISK performed by Rosemary Taylor MD at [address] [place] [address] ([facility]) LITHOTRIPSY      TUBAL LIGATION      TUBAL LIGATION      WISDOM TOOTH EXTRACTION       CURRENT MEDICATIONS       Discharge Medication List as of 6/3/2021  1:32 AM abnormals are listed below. Labs Reviewed - No data to display    EMERGENCY DEPARTMENTCOURSE:   Patient did well in the ED peer  Headache improved with treatment plan. Given Rx for meclizine. No further work-up indicated this time. Nursing notes reviewed. At this time this is what I find, the patient appears well and does not appear sick or toxic. I gave my usual and customary discussion of the risks and benefits of discharge versus admission. I answered the patient's questions. I gave the patient strict return precautions. Patient expressed understanding of the discharge instructions. Dictated but not reviewed. Vitals:    Vitals:    06/03/21 0015 06/03/21 0100   BP: (!) 147/100 (!) 140/95   Pulse: 85 67   Resp: 18 15   SpO2: 98% 98%   Weight: 193 lb 11.2 oz (87.9 kg)    Height: 5' 1\" (1.549 m)        The patient was given the following medications while in the emergency department:  Orders Placed This Encounter   Medications    acetaminophen (TYLENOL) tablet 1,000 mg    ibuprofen (ADVIL;MOTRIN) tablet 800 mg    metoclopramide (REGLAN) tablet 10 mg    meclizine (ANTIVERT) 25 MG tablet     Sig: Take 1 tablet by mouth 3 times daily as needed for Dizziness     Dispense:  10 tablet     Refill:  0     CONSULTS:  None    FINAL IMPRESSION      1.  Benign paroxysmal positional vertigo, unspecified laterality          DISPOSITION/PLAN   DISPOSITION Decision To Discharge 06/03/2021 01:30:16 AM      PATIENT REFERRED TO:  Isaura Rivera MD  [address] [place] [address]  [address] [address]  [phone]    In 2 days      DISCHARGE MEDICATIONS:  Discharge Medication List as of 6/3/2021  1:32 AM      START taking these medications    Details   meclizine (ANTIVERT) 25 MG tablet Take 1 tablet by mouth 3 times daily as needed for Dizziness, Disp-10 tablet, R-0Print           Evi Azar MD  Attending Emergency Physician                    Ismael Bell MD  06/03/21 2028

## 2021-06-04 ENCOUNTER — CARE COORDINATION (OUTPATIENT)
Dept: CARE COORDINATION | Age: 46
End: 2021-06-04

## 2021-06-14 DIAGNOSIS — E66.01 SEVERE OBESITY (BMI 35.0-35.9 WITH COMORBIDITY) (HCC): Primary | ICD-10-CM

## 2021-06-14 DIAGNOSIS — R73.03 PREDIABETES: ICD-10-CM

## 2021-06-22 ENCOUNTER — TELEPHONE (OUTPATIENT)
Dept: OBGYN CLINIC | Age: 46
End: 2021-06-22

## 2021-06-22 RX ORDER — METRONIDAZOLE 500 MG/1
500 TABLET ORAL 2 TIMES DAILY
Qty: 14 TABLET | Refills: 0 | Status: SHIPPED | OUTPATIENT
Start: 2021-06-22 | End: 2021-06-29

## 2021-07-23 ENCOUNTER — HOSPITAL ENCOUNTER (OUTPATIENT)
Age: 46
Setting detail: SPECIMEN
Discharge: HOME | End: 2021-07-23
Payer: COMMERCIAL

## 2021-07-23 ENCOUNTER — OFFICE VISIT (OUTPATIENT)
Dept: OBGYN CLINIC | Age: 46
End: 2021-07-23
Payer: COMMERCIAL

## 2021-07-23 VITALS
WEIGHT: 187 LBS | BODY MASS INDEX: 35.3 KG/M2 | HEIGHT: 61 IN | SYSTOLIC BLOOD PRESSURE: 120 MMHG | DIASTOLIC BLOOD PRESSURE: 84 MMHG

## 2021-07-23 DIAGNOSIS — Z11.51 SCREENING FOR HUMAN PAPILLOMAVIRUS: ICD-10-CM

## 2021-07-23 DIAGNOSIS — N64.4 BREAST PAIN: ICD-10-CM

## 2021-07-23 DIAGNOSIS — Z11.3 SCREENING EXAMINATION FOR STD (SEXUALLY TRANSMITTED DISEASE): ICD-10-CM

## 2021-07-23 DIAGNOSIS — R73.03 PREDIABETES: ICD-10-CM

## 2021-07-23 DIAGNOSIS — Z12.31 ENCOUNTER FOR SCREENING MAMMOGRAM FOR MALIGNANT NEOPLASM OF BREAST: ICD-10-CM

## 2021-07-23 DIAGNOSIS — E66.01 SEVERE OBESITY (BMI 35.0-35.9 WITH COMORBIDITY) (HCC): ICD-10-CM

## 2021-07-23 DIAGNOSIS — E78.5 HYPERLIPIDEMIA WITH TARGET LDL LESS THAN 100: ICD-10-CM

## 2021-07-23 DIAGNOSIS — Z01.419 VISIT FOR GYNECOLOGIC EXAMINATION: Primary | ICD-10-CM

## 2021-07-23 DIAGNOSIS — Z51.81 MEDICATION MONITORING ENCOUNTER: ICD-10-CM

## 2021-07-23 LAB
ALBUMIN SERPL-MCNC: 4.2 G/DL (ref 3.5–5.2)
ALBUMIN/GLOBULIN RATIO: 1.4 (ref 1–2.5)
ALP BLD-CCNC: 56 U/L (ref 35–104)
ALT SERPL-CCNC: 13 U/L (ref 5–33)
ANION GAP SERPL CALCULATED.3IONS-SCNC: 14 MMOL/L (ref 9–17)
AST SERPL-CCNC: 11 U/L
BILIRUB SERPL-MCNC: 0.31 MG/DL (ref 0.3–1.2)
BUN BLDV-MCNC: 9 MG/DL (ref 6–20)
BUN/CREAT BLD: NORMAL (ref 9–20)
CALCIUM SERPL-MCNC: 9.5 MG/DL (ref 8.6–10.4)
CHLORIDE BLD-SCNC: 99 MMOL/L (ref 98–107)
CHOLESTEROL, FASTING: 211 MG/DL
CHOLESTEROL/HDL RATIO: 4.5
CO2: 22 MMOL/L (ref 20–31)
CREAT SERPL-MCNC: 0.72 MG/DL (ref 0.5–0.9)
FOLATE: 10.2 NG/ML
GFR AFRICAN AMERICAN: >60 ML/MIN
GFR NON-AFRICAN AMERICAN: >60 ML/MIN
GFR SERPL CREATININE-BSD FRML MDRD: NORMAL ML/MIN/{1.73_M2}
GFR SERPL CREATININE-BSD FRML MDRD: NORMAL ML/MIN/{1.73_M2}
GLUCOSE BLD-MCNC: 80 MG/DL (ref 70–99)
HCT VFR BLD CALC: 41.4 % (ref 36.3–47.1)
HDLC SERPL-MCNC: 47 MG/DL
HEMOGLOBIN: 12.8 G/DL (ref 11.9–15.1)
HEPATITIS B SURFACE ANTIGEN: NONREACTIVE
HEPATITIS C ANTIBODY: NONREACTIVE
HIV AG/AB: NONREACTIVE
LDL CHOLESTEROL: 122 MG/DL (ref 0–130)
MCH RBC QN AUTO: 29.6 PG (ref 25.2–33.5)
MCHC RBC AUTO-ENTMCNC: 30.9 G/DL (ref 28.4–34.8)
MCV RBC AUTO: 95.6 FL (ref 82.6–102.9)
NRBC AUTOMATED: 0 PER 100 WBC
PDW BLD-RTO: 13.3 % (ref 11.8–14.4)
PLATELET # BLD: 336 K/UL (ref 138–453)
PMV BLD AUTO: 10.9 FL (ref 8.1–13.5)
POTASSIUM SERPL-SCNC: 4.2 MMOL/L (ref 3.7–5.3)
RBC # BLD: 4.33 M/UL (ref 3.95–5.11)
SODIUM BLD-SCNC: 135 MMOL/L (ref 135–144)
T. PALLIDUM, IGG: NONREACTIVE
TOTAL PROTEIN: 7.3 G/DL (ref 6.4–8.3)
TRIGLYCERIDE, FASTING: 211 MG/DL
TSH SERPL DL<=0.05 MIU/L-ACNC: 0.94 MIU/L (ref 0.3–5)
VITAMIN B-12: 276 PG/ML (ref 232–1245)
VLDLC SERPL CALC-MCNC: ABNORMAL MG/DL (ref 1–30)
WBC # BLD: 6 K/UL (ref 3.5–11.3)

## 2021-07-23 PROCEDURE — 99396 PREV VISIT EST AGE 40-64: CPT | Performed by: NURSE PRACTITIONER

## 2021-07-23 ASSESSMENT — PATIENT HEALTH QUESTIONNAIRE - PHQ9
SUM OF ALL RESPONSES TO PHQ9 QUESTIONS 1 & 2: 0
SUM OF ALL RESPONSES TO PHQ QUESTIONS 1-9: 0
1. LITTLE INTEREST OR PLEASURE IN DOING THINGS: 0
SUM OF ALL RESPONSES TO PHQ QUESTIONS 1-9: 0
SUM OF ALL RESPONSES TO PHQ QUESTIONS 1-9: 0
2. FEELING DOWN, DEPRESSED OR HOPELESS: 0

## 2021-07-23 NOTE — PROGRESS NOTES
History and Physical  [address], [address]  [address], [address] [phone]   Fax [phone]  Lamont Stewart  2021              51 y.o. Chief Complaint   Patient presents with    Gynecologic Exam       No LMP recorded. Patient has had an ablation. Primary Care Physician: Lynann Apgar, MD    The patient was seen and examined. She has no chief complaint today and is here for her annual exam.  Her bowels are regular. There are no voiding complaints. She denies any bloating. She denies vaginal discharge and was counseled on STD's and the need for barrier contraception. HPI : Lamont Stewart is a 51 y.o. female [id number]    Annual exam  Complaining of skin tags to perineum. Would like to have removed. Keeps knicking them with her razor. Hx of endometrial ablation in . At first wasn't having periods, now in past year, periods occur every month, last 3 days long. Patient denies bleeding being heavy. Complaining of bilateral breast pain. Occurs a week prior to period. After period ends, pain lasts for a couple of days. Been happening for past couple of months. Denies redness, trauma to breasts, nipple discharge. Denies family hx of breast cancer.     Desires STD testing. ________________________________________________________________________  OB History    Para Term  AB Living   5 4 4 0 1 4   SAB TAB Ectopic Molar Multiple Live Births   0 1 0 0 0 4      # Outcome Date GA Lbr Robert/2nd Weight Sex Delivery Anes PTL Lv   5 TAB        N    4 Term      Vag-Spont  N MONTY   3 Term      Vag-Spont  N MONTY   2 Term      Vag-Spont  N MONTY   1 Term      Vag-Spont  N MONTY     Past Medical History:   Diagnosis Date    Anxiety 2019    Asthma     Current mild episode of major depressive disorder without prior episode ([facility] [address] 2020    Dyslipidemia (high LDL; low HDL) 2017    History of endometrial ablation     History of kidney stones 5/22/2017    History of tubal ligation     Hyperglycemia 5/22/2017    Hypertriglyceridemia 5/22/2017    Hypomagnesemia 2/14/2020    Obesity (BMI 30-39. 9) 5/22/2017    Pre-syncope 2/13/2020    Vision abnormalities     glasses/contacts                                                                   Past Surgical History:   Procedure Laterality Date    COLONOSCOPY      COLONOSCOPY  06/28/2019    COLONOSCOPY N/A 6/28/2019    COLORECTAL CANCER SCREENING, NOT HIGH RISK performed by Jing Villarreal MD at [address] [place] [address] ([facility]) LITHOTRIPSY      TUBAL LIGATION      TUBAL LIGATION      WISDOM TOOTH EXTRACTION       Family History   Problem Relation Age of Onset    Diabetes Father         possible type 1    Heart Attack Father 35    Bipolar Disorder Sister     Lung Cancer Paternal Grandmother     Heart Attack Paternal Grandfather      Social History     Socioeconomic History    Marital status: Single     Spouse name: Not on file    Number of children: Not on file    Years of education: Not on file    Highest education level: Not on file   Occupational History    Not on file   Tobacco Use    Smoking status: Never Smoker    Smokeless tobacco: Never Used   Vaping Use    Vaping Use: Never used   Substance and Sexual Activity    Alcohol use: Yes     Alcohol/week: 0.0 standard drinks     Comment: Socially    Drug use: No    Sexual activity: Yes     Partners: Male     Birth control/protection: Surgical     Comment: TL   Other Topics Concern    Not on file   Social History Narrative    Not on file     Social Determinants of Health     Financial Resource Strain:     Difficulty of Paying Living Expenses:    Food Insecurity:     Worried About Running Out of Food in the Last Year:     [address] in the Last Year:    Transportation Needs:     Lack of Transportation (Medical):      Lack of Transportation (Non-Medical):    Physical Activity:     Days of Exercise per Week:     Minutes of Exercise per Session:    Stress:     Feeling of Stress :    Social Connections:     Frequency of Communication with Friends and Family:     Frequency of Social Gatherings with Friends and Family:     Attends Rastafarian Services:     Active Member of Clubs or Organizations:     Attends Club or Organization Meetings:     Marital Status:    Intimate Partner Violence:     Fear of Current or Ex-Partner:     Emotionally Abused:     Physically Abused:     Sexually Abused:        MEDICATIONS:  Current Outpatient Medications   Medication Sig Dispense Refill    Insulin Pen Needle 31G X 5 MM MISC 1 each by Does not apply route once a week With Ozempic 12 each 3    Magnesium 400 MG CAPS Take by mouth      albuterol sulfate HFA (PROAIR HFA) 108 (90 Base) MCG/ACT inhaler Inhale 2 puffs into the lungs every 6 hours as needed for Wheezing or Shortness of Breath (cough) 8 g 3    nystatin (MYCOSTATIN) 321134 UNIT/ML suspension Take 5 mLs by mouth 4 times daily Swish and swallow (Patient not taking: Reported on 7/23/2021) 240 mL 0    amLODIPine (NORVASC) 2.5 MG tablet Take 1 tablet by mouth daily (Patient not taking: Reported on 7/23/2021) 90 tablet 1    pravastatin (PRAVACHOL) 40 MG tablet Take 1 tablet by mouth every evening For high cholesterol and triglycerides (Patient not taking: Reported on 7/23/2021) 90 tablet 3    fluticasone (FLONASE) 50 MCG/ACT nasal spray 2 sprays by Nasal route nightly (Patient not taking: Reported on 7/23/2021) 1 Bottle 3    metFORMIN (GLUCOPHAGE-XR) 500 MG extended release tablet Take 2 tablets by mouth daily (with breakfast) (Patient not taking: Reported on 7/23/2021) 180 tablet 3    busPIRone (BUSPAR) 7.5 MG tablet Take 7.5 mg by mouth 2 times daily as needed for Anxiety Prescribed as BID, but patient takes PRN BID (Patient not taking: Reported on 7/23/2021)       No current facility-administered medications for this visit. ALLERGIES:  Allergies as of 07/23/2021    (No Known Allergies)       Symptoms of decreased mood absent  Symptoms of anhedonia absent    **If either question is answered in a  positive fashion then complete the PHQ9 Scoring Evaluation and make the appropriate referral**      Immunization status: stated as current, but no records available. Gynecologic History:  Menarche: 14 yo  Menopause at 44 yo     No LMP recorded. Patient has had an ablation. Sexually Active: Yes    STD History: hx HPV years ago on pap smear    Permanent Sterilization: Yes TL   Reversible Birth Control: No        Hormone Replacement Exposure: No      Genetic Qualified Family History of Breast, Ovarian , Colon or Uterine Cancer: No     If YES see scanned worksheet. Preventative Health Testing:    Health Maintenance:  Health Maintenance Due   Topic Date Due    Lipid screen  02/14/2021       Date of Last Pap Smear: 7/10/2020 neg/neg  Abnormal Pap Smear History: yes years ago  Colposcopy History:   Date of Last Mammogram: 7/30/2020 negative  Date of Last Colonoscopy: 2019 normal  Date of Last Bone Density:      ________________________________________________________________________        REVIEW OF SYSTEMS:    yes   A minimum of an eleven point review of systems was completed. Review Of Systems (11 point):  Constitutional: No fever, chills or malaise;  No weight change or fatigue  Head and Eyes: No vision, Headache, Dizziness or trauma in last 12 months  ENT ROS: No hearing, Tinnitis, sinus or taste problems  Hematological and Lymphatic ROS:No Lymphoma, Von Willebrand's, Hemophillia or Bleeding History  Psych ROS: No Depression, Homicidal thoughts,suicidal thoughts, or anxiety  Breast ROS: No prior breast abnormalities or lumps. + bilateral breast pain  Respiratory ROS: No SOB, Pneumoniae,Cough, or Pulmonary Embolism History  Cardiovascular ROS: No Chest Pain with Exertion, Palpitations, Syncope, Edema, Arrhythmia  Gastrointestinal ROS: No Indigestion, Heartburn, Nausea, vomiting, Diarrhea, Constipation,or Bowel Changes; No Bloody Stools or melena  Genito-Urinary ROS: No Dysuria, Hematuria or Nocturia. No Urinary Incontinence or Vaginal Discharge  Musculoskeletal ROS: No Arthralgia, Arthritis,Gout,Osteoporosis or Rheumatism  Neurological ROS: No CVA, Migraines, Epilepsy, Seizure Hx, or Limb Weakness. + prediabetic  Dermatological ROS: No Rash, Itching, Hives, Mole Changes or Cancer                                                                                                                                                                                                                                  PHYSICAL Exam:     Constitutional:  Vitals:    07/23/21 1203   BP: 120/84   Site: Right Upper Arm   Position: Sitting   Cuff Size: Medium Adult   Weight: 187 lb (84.8 kg)   Height: 5' 1\" (1.549 m)       Chaperone for Intimate Exam   Chaperone was offered and accepted as part of the rooming process.  Chaperone: Robin Sandy          General Appearance: This  is a well Developed, well Nourished, well groomed female. Her BMI was reviewed. Nutritional decision making was discussed. Skin:  There was a Normal Inspection of the skin without rashes or lesions. There were no rashes. (Papular, Maculopapular, Hives, Pustular, Macular)     There were no lesions (Ulcers, Erythema, Abn. Appearing Nevi)            Lymphatic:  No Lymph Nodes were Palpable in the neck , axilla or groin.  0 # Of Lymph Nodes; Location ; Character [Normal]  [Shotty] [Tender] [Enlarged]     Neck and EENT:  The neck was supple. There were no masses   The thyroid was not enlarged and had no masses. Perrla, EOMI B/L, TMI B/L No Abnormalities. Throat inspected-No exudates or Masses, Nares Patent No Masses        Respiratory: The lungs were auscultated and found to be clear. There were no rales, rhonchi or wheezes.  There was a good respiratory effort. Cardiovascular: The heart was in a regular rate and rhythm. . No S3 or S4. There was no murmur appreciated. Location, grade, and radiation are not applicable. Extremities: The patients extremities were without calf tenderness, edema, or varicosities. There was full range of motion in all four extremities. Pulses in all four extremities were appreciated and are 2/4. Abdomen: The abdomen was soft and non-tender. There were good bowel sounds in all quadrants and there was no guarding, rebound or rigidity. On evaluation there was no evidence of hepatosplenomegaly and there was no costal vertebral valerie tenderness bilaterally. No hernias were appreciated. Abdominal Scars: C/W previous surgery    Psych: The patient had a normal Orientation to: Time, Place, Person, and Situation  There is no Mood / Affect changes    Breast:  (Chest)  normal appearance, no masses or tenderness. No supraclavicular or axillary adenopathy noted. Self breast exams were reviewed in detail. Literature was given. Pelvic Exam:  Vulva and vagina appear normal. Bimanual exam reveals normal uterus and adnexa. 3 small raised circular 0.5cm ?skin tags vs condyloma noted on left perineum below left labia. Rectal Exam:  exam declined by patient          Musculosk:  Normal Gait and station was noted. Digits were evaluated without abnormal findings. Range of motion, stability and strength were evaluated and found to be appropriate for the patients age. ASSESSMENT:      51 y.o. Annual   Diagnosis Orders   1. Visit for gynecologic examination  PAP Smear   2. Encounter for screening mammogram for malignant neoplasm of breast  BOYD DIGITAL DIAGNOSTIC W OR WO CAD BILATERAL   3. Screening for human papillomavirus  PAP Smear   4.  Breast pain  BOYD DIGITAL DIAGNOSTIC W OR WO CAD BILATERAL   5. Screening examination for STD (sexually transmitted disease)  VAGINITIS DNA PROBE    C.trachomatis N.gonorrhoeae DNA Hepatitis B Surface Antigen    Hepatitis C Antibody    HIV Screen    T. pallidum Ab    HERPES PROFILE          Chief Complaint   Patient presents with    Gynecologic Exam          Past Medical History:   Diagnosis Date    Anxiety 9/20/2019    Asthma     Current mild episode of major depressive disorder without prior episode ([address] 2/13/2020    Dyslipidemia (high LDL; low HDL) 5/22/2017    History of endometrial ablation     History of kidney stones 5/22/2017    History of tubal ligation     Hyperglycemia 5/22/2017    Hypertriglyceridemia 5/22/2017    Hypomagnesemia 2/14/2020    Obesity (BMI 30-39. 9) 5/22/2017    Pre-syncope 2/13/2020    Vision abnormalities     glasses/contacts         Patient Active Problem List    Diagnosis Date Noted    Enlarged tonsils 11/03/2020    History of uterine fibroid 06/29/2020    Family history of premature CAD 02/21/2020    Heart palpitations 02/13/2020    Chronic fatigue 02/13/2020    Hot flashes 02/13/2020    Current mild episode of major depressive disorder without prior episode ([facility] [address] 02/13/2020    Abnormal EKG 02/13/2020    Anxiety 09/20/2019    History of colon polyps 03/10/2019    Slow transit constipation 03/10/2019    Asthma, exercise induced 10/19/2018    Snoring 07/02/2018    History of endometrial ablation 05/22/2017     2001      Hyperlipidemia with target LDL less than 100 05/22/2017    Hyperglycemia 05/22/2017    Prediabetes 05/22/2017    Severe obesity (BMI 35.0-35.9 with comorbidity) ([address] 05/22/2017    History of kidney stones 05/22/2017    Herpes simplex type 1 infection 05/23/2016 5/23/2016 + herpes simplex type I IgG antibody      History of tubal ligation     Vision abnormalities      glasses/contacts            Hereditary Breast, Ovarian, Colon and Uterine Cancer screening Done.           Tobacco & Secondary smoke risks reviewed; instructed on cessation and avoidance      Counseling Completed:  Preventative Health Recommendations and Follow up. The patient was informed of the recommended preventative health recommendations. 1. Annuals every year; Cytology collections per prevailing guidelines. 2. Mammograms begin every year at 35 yo if no abnormalities are found and no family history. 3. Bone density studies every 2-3 years. Begin at 73 yo. If no fracture history or osteoporosis family history. (significant). 4. Colonoscopy begin at 38 yo. Repeat every ten years if negative and no family history. 5. Calcium of 9381-5331 mg/day in split dosing  6. Vitamin D 400-800 IU/day  7. All other preventative health recommendations will be managed by the patients Primary care physician. PLAN:  Return in about 58 weeks (around 9/2/2022) for skin tag removal/ Dr Isaías Lundberg. Pap smear and vaginal cultures collected. Lab slips given. Diagnostic mammogram ordered. Repeat Annual every 1 year  Cervical Cytology Evaluation begins at 24years old. If Negative Cytology, Follow-up screening per current guidelines. Mammograms every 1 year. If 35 yo and last mammogram was negative. Calcium and Vitamin D dosing reviewed. Colonoscopy screening reviewed as well as onset for bone density testing. Birth control and barrier recommendations discussed. STD counseling and prevention reviewed. Gardisil counseling completed for all patients 10-35 yo. Routine health maintenance per patients PCP. Orders Placed This Encounter   Procedures    VAGINITIS DNA PROBE     Standing Status:   Future     Standing Expiration Date:   7/23/2022    C.trachomatis N.gonorrhoeae DNA     Standing Status:   Future     Standing Expiration Date:   8/23/2021    BOYD DIGITAL DIAGNOSTIC W OR WO CAD BILATERAL     Standing Status:   Future     Standing Expiration Date:   9/23/2022     Order Specific Question:   Reason for exam:     Answer:   screening for breast cancer, bilateral breast pain    PAP Smear     Patient History:    No LMP recorded.  Patient Encounter for screening mammogram for malignant neoplasm of breast  BOYD DIGITAL DIAGNOSTIC W OR WO CAD BILATERAL   3. Screening for human papillomavirus  PAP Smear   4. Breast pain  BOYD DIGITAL DIAGNOSTIC W OR WO CAD BILATERAL   5. Screening examination for STD (sexually transmitted disease)  VAGINITIS DNA PROBE    C.trachomatis N.gonorrhoeae DNA    Hepatitis B Surface Antigen    Hepatitis C Antibody    HIV Screen    T. pallidum Ab    HERPES PROFILE        The patient had her preventative health maintenance recommendations and follow-up reviewed with her at the completion of her visit.

## 2021-07-24 LAB
ESTIMATED AVERAGE GLUCOSE: 114 MG/DL
HBA1C MFR BLD: 5.6 % (ref 4–6)

## 2021-07-24 NOTE — RESULT ENCOUNTER NOTE
ABNORMAL. Please notify patient. Vitamin B12 is very low, she start taking vitamin B12 1000 MCG daily from over-the-counter  Cholesterol is slightly improved from prior but still high triglycerides.   Greatly improved prediabetes    Otherwise labs within normal limits      Future Appointments  8/5/2021   2:45 PM    Elias Finley MD     [facility]               MHTOP  9/2/2021   10:30 AM   Ankit Monique, [address] [place] [address]

## 2021-07-27 ENCOUNTER — TELEPHONE (OUTPATIENT)
Dept: OBGYN CLINIC | Age: 46
End: 2021-07-27

## 2021-07-27 DIAGNOSIS — Z11.3 SCREENING EXAMINATION FOR STD (SEXUALLY TRANSMITTED DISEASE): ICD-10-CM

## 2021-07-27 LAB
HERPES SIMPLEX VIRUS 1 IGG: 6.73
HERPES SIMPLEX VIRUS 2 IGG: 0.66
HERPES TYPE 1/2 IGM COMBINED: 0.47

## 2021-07-27 RX ORDER — FLUCONAZOLE 150 MG/1
150 TABLET ORAL ONCE
Qty: 2 TABLET | Refills: 0 | Status: SHIPPED | OUTPATIENT
Start: 2021-07-27 | End: 2021-07-27

## 2021-07-27 RX ORDER — METRONIDAZOLE 500 MG/1
500 TABLET ORAL 2 TIMES DAILY
Qty: 14 TABLET | Refills: 0 | Status: SHIPPED | OUTPATIENT
Start: 2021-07-27 | End: 2022-05-16 | Stop reason: SDUPTHER

## 2021-07-27 NOTE — TELEPHONE ENCOUNTER
Per Jose Pruitt pt notified of +BV/ +candida on culture and pt to get flagyl 500mg and diflucan 150mg sent to pt pharm.

## 2021-07-27 NOTE — TELEPHONE ENCOUNTER
----- Message from MATT Celeste CNP sent at 7/27/2021  2:01 PM EDT -----  +HSV type I with no active or recent outbreaks.   Please let patient know and offer treatment with Valtrex 500mg PO BID X 10 days if having outbreak

## 2021-07-27 NOTE — TELEPHONE ENCOUNTER
----- Message from MATT Finch CNP sent at 7/27/2021 10:14 AM EDT -----  +BV and candida  Flagyl 500mg PO BID X7 days  Diflucan 150mg PO X 1 now and repeat in 7 days  Please call script in if not already done

## 2021-07-30 ENCOUNTER — TELEPHONE (OUTPATIENT)
Dept: OBGYN CLINIC | Age: 46
End: 2021-07-30

## 2021-07-30 DIAGNOSIS — Z01.419 VISIT FOR GYNECOLOGIC EXAMINATION: ICD-10-CM

## 2021-07-30 DIAGNOSIS — Z11.3 SCREENING EXAMINATION FOR STD (SEXUALLY TRANSMITTED DISEASE): ICD-10-CM

## 2021-07-30 DIAGNOSIS — Z11.51 SCREENING FOR HUMAN PAPILLOMAVIRUS: ICD-10-CM

## 2021-07-30 NOTE — TELEPHONE ENCOUNTER
Left message for patient. Looks like patient was previously treated for yeast. left message for patient to return call about pap results.

## 2021-07-30 NOTE — TELEPHONE ENCOUNTER
----- Message from MATT Vidal NP sent at 7/30/2021  9:29 AM EDT -----  + yeast  Diflucan 150 mg PO x 1 repeat in 7 days     Pap smear neg  HPV neg  Age 51

## 2021-08-05 ENCOUNTER — OFFICE VISIT (OUTPATIENT)
Dept: FAMILY MEDICINE CLINIC | Age: 46
End: 2021-08-05
Payer: COMMERCIAL

## 2021-08-05 VITALS
HEART RATE: 74 BPM | BODY MASS INDEX: 35.3 KG/M2 | HEIGHT: 61 IN | TEMPERATURE: 96.8 F | SYSTOLIC BLOOD PRESSURE: 116 MMHG | OXYGEN SATURATION: 98 % | DIASTOLIC BLOOD PRESSURE: 78 MMHG | WEIGHT: 187 LBS

## 2021-08-05 DIAGNOSIS — Z00.00 ANNUAL PHYSICAL EXAM: Primary | ICD-10-CM

## 2021-08-05 DIAGNOSIS — R73.9 HYPERGLYCEMIA: ICD-10-CM

## 2021-08-05 DIAGNOSIS — Z90.49 STATUS POST APPENDECTOMY: ICD-10-CM

## 2021-08-05 DIAGNOSIS — E53.8 VITAMIN B 12 DEFICIENCY: ICD-10-CM

## 2021-08-05 DIAGNOSIS — E78.5 HYPERLIPIDEMIA WITH TARGET LDL LESS THAN 100: ICD-10-CM

## 2021-08-05 PROCEDURE — 99396 PREV VISIT EST AGE 40-64: CPT | Performed by: FAMILY MEDICINE

## 2021-08-05 SDOH — ECONOMIC STABILITY: FOOD INSECURITY: WITHIN THE PAST 12 MONTHS, YOU WORRIED THAT YOUR FOOD WOULD RUN OUT BEFORE YOU GOT MONEY TO BUY MORE.: NEVER TRUE

## 2021-08-05 SDOH — ECONOMIC STABILITY: FOOD INSECURITY: WITHIN THE PAST 12 MONTHS, THE FOOD YOU BOUGHT JUST DIDN'T LAST AND YOU DIDN'T HAVE MONEY TO GET MORE.: NEVER TRUE

## 2021-08-05 ASSESSMENT — ENCOUNTER SYMPTOMS
SHORTNESS OF BREATH: 0
CHEST TIGHTNESS: 0
BLOOD IN STOOL: 0
COUGH: 0
CONSTIPATION: 0
WHEEZING: 0
ABDOMINAL DISTENTION: 0
VOMITING: 0
ABDOMINAL PAIN: 1
BACK PAIN: 0
DIARRHEA: 0
NAUSEA: 0

## 2021-08-05 ASSESSMENT — SOCIAL DETERMINANTS OF HEALTH (SDOH): HOW HARD IS IT FOR YOU TO PAY FOR THE VERY BASICS LIKE FOOD, HOUSING, MEDICAL CARE, AND HEATING?: SOMEWHAT HARD

## 2021-08-05 ASSESSMENT — PATIENT HEALTH QUESTIONNAIRE - PHQ9
SUM OF ALL RESPONSES TO PHQ QUESTIONS 1-9: 0
SUM OF ALL RESPONSES TO PHQ9 QUESTIONS 1 & 2: 0
SUM OF ALL RESPONSES TO PHQ QUESTIONS 1-9: 0
2. FEELING DOWN, DEPRESSED OR HOPELESS: 0
SUM OF ALL RESPONSES TO PHQ QUESTIONS 1-9: 0
1. LITTLE INTEREST OR PLEASURE IN DOING THINGS: 0

## 2021-08-05 ASSESSMENT — VISUAL ACUITY
OS_CC: 20/20
OD_CC: 20/25

## 2021-08-05 NOTE — PATIENT INSTRUCTIONS
Patient Education        Learning About Low-Carbohydrate Diets  What is a low-carbohydrate diet? A low-carbohydrate (or \"low-carb\") diet limits foods and drinks that have carbohydrates. This includes grains, fruits, milk and yogurt, and starchy vegetables like potatoes, beans, and corn. It also avoids foods and drinks that have added sugar. Instead, low-carb diets include foods that are high in protein and fat. Why might you follow a low-carb diet? Low-carb diets may be used for a variety of reasons, such as for weight loss. People who have diabetes may use a low-carb diet to help manage their blood sugar levels. What should you do before you start the diet? Talk to your doctor before you try any diet. This is even more important if you have health problems like kidney disease, heart disease, or diabetes. Your doctor may suggest that you meet with a registered dietitian. A dietitian can help you make an eating plan that works for you. What foods do you eat on a low-carb diet? On a low-carb diet, you choose foods that are high in protein and fat. Examples of these are:  · Meat, poultry, and fish. · Eggs. · Nuts, such as walnuts, pecans, almonds, and peanuts. · Peanut butter and other nut butters. · Tofu. · Avocado. · [facility]. · Non-starchy vegetables like broccoli, cauliflower, green beans, mushrooms, peppers, lettuce, and spinach. · Unsweetened non-dairy milks like almond milk and coconut milk. · Cheese, cottage cheese, and cream cheese. Current as of: December 17, 2020               Content Version: 12.9  © 2006-2021 [employer]. Care instructions adapted under license by [facility] ([facility]). If you have questions about a medical condition or this instruction, always ask your healthcare professional. [address] any warranty or liability for your use of this information.          Patient Education        Well Visit, Ages 25 to 48: Care Instructions  Overview     Well visits can help you stay healthy. Your doctor has checked your overall health and may have suggested ways to take good care of yourself. Your doctor also may have recommended tests. At home, you can help prevent illness with healthy eating, regular exercise, and other steps. Follow-up care is a key part of your treatment and safety. Be sure to make and go to all appointments, and call your doctor if you are having problems. It's also a good idea to know your test results and keep a list of the medicines you take. How can you care for yourself at home? · Get screening tests that you and your doctor decide on. Screening helps find diseases before any symptoms appear. · Eat healthy foods. Choose fruits, vegetables, whole grains, protein, and low-fat dairy foods. Limit fat, especially saturated fat. Reduce salt in your diet. · Limit alcohol. If you are a man, have no more than 2 drinks a day or 14 drinks a week. If you are a woman, have no more than 1 drink a day or 7 drinks a week. · Get at least 30 minutes of physical activity on most days of the week. Walking is a good choice. You also may want to do other activities, such as running, swimming, cycling, or playing tennis or team sports. Discuss any changes in your exercise program with your doctor. · Reach and stay at a healthy weight. This will lower your risk for many problems, such as obesity, diabetes, heart disease, and high blood pressure. · Do not smoke or allow others to smoke around you. If you need help quitting, talk to your doctor about stop-smoking programs and medicines. These can increase your chances of quitting for good. · Care for your mental health. It is easy to get weighed down by worry and stress. Learn strategies to manage stress, like deep breathing and mindfulness, and stay connected with your family and community. If you find you often feel sad or hopeless, talk with your doctor. Treatment can help.   · Talk to your doctor about whether you have any risk factors for sexually transmitted infections (STIs). You can help prevent STIs if you wait to have sex with a new partner (or partners) until you've each been tested for STIs. It also helps if you use condoms (male or female condoms) and if you limit your sex partners to one person who only has sex with you. Vaccines are available for some STIs, such as HPV. · Use birth control if it's important to you to prevent pregnancy. Talk with your doctor about the choices available and what might be best for you. · If you think you may have a problem with alcohol or drug use, talk to your doctor. This includes prescription medicines (such as amphetamines and opioids) and illegal drugs (such as cocaine and methamphetamine). Your doctor can help you figure out what type of treatment is best for you. · Protect your skin from too much sun. When you're outdoors from 10 a.m. to 4 p.m., stay in the shade or cover up with clothing and a hat with a wide brim. Wear sunglasses that block UV rays. Even when it's cloudy, put broad-spectrum sunscreen (SPF 30 or higher) on any exposed skin. · See a dentist one or two times a year for checkups and to have your teeth cleaned. · Wear a seat belt in the car. When should you call for help? Watch closely for changes in your health, and be sure to contact your doctor if you have any problems or symptoms that concern you. Where can you learn more? Go to [email or web address]://genia.[email or web address] org and sign in to your [employer] account. Enter P072 in the [facility] box to learn more about \"Well Visit, Ages 25 to 48: Care Instructions. \"     If you do not have an account, please click on the \"Sign Up Now\" link. Current as of: May 27, 2020               Content Version: 12.9  © 9631-3975 [employer]. Care instructions adapted under license by [address]  If you have questions about a medical condition or this instruction, always ask your healthcare professional. [address] any warranty or liability for your use of this information.

## 2021-08-05 NOTE — PROGRESS NOTES
2021      Celia Thompson (:  1975) is a 51 y.o. female, here for a preventive medicine evaluation, Annual Exam and Hyperlipidemia   . ASSESSMENT/PLAN:    1. Annual physical exam  Low carb, low fat diet, increase fruits and vegetables, and exercise 4-5 times a week 30-40 minutes a day, or walk 1-2 hours per day, or wear a pedometer and get at least 10,000 steps per day. Dental exam 2-3 times /year advised. Immunizations reviewed. Health Maintenance reviewed   Annual eye exam advised. 2. Status post appendectomy  Healing  Has video conference with surgeon  OK to extend letter for restrictions for 2 more weeks if surgeon will not do it, patient was advised to contact us    3. Hyperlipidemia with target LDL less than 100  improving  Lab Results   Component Value Date    LDLCHOLESTEROL 122 2021    I advised life style modification: diet and exercise discussed. The patient is advised to attempt to lose weight. 4. Hyperglycemia  Improving  She self discontinued Metformin  Lab Results   Component Value Date    LABA1C 5.6 2021    LABA1C 5.9 2020    LABA1C 6.0 2020   low carb diet    -To continue    semaglutide, 1 MG/DOSE, 2 MG/1.5ML SOPN; Inject 1 mg into the skin once a week Please read the BLACK BOX, Disp-3 pen, R-3Normal  5. Vitamin B 12 deficiency  - start    cyanocobalamin ([employer] VITAMIN B12) 1000 MCG tablet; Take 1 tablet by mouth daily, Disp-30 tablet, R-3NO PRINT        Celia received counseling on the following healthy behaviors: nutrition, exercise, medication adherence and weight loss    Reviewed prior labs and health maintenance  Discussed use, benefit, and side effects of prescribed medications. Barriers to medication compliance addressed. Patient given educational materials - see patient instructions  All patient questions answered. Patient voiced understanding.     The patient's past medical, surgical, social, and family history as well as her  current medications and allergies were reviewed as documented in today's encounter. Medications, labs, diagnostic studies, consultations and [facility] Return in about 4 months (around 12/5/2021). Data Unavailable    Future Appointments   Date Time Provider Diamond Gonzalez   9/2/2021 10:30 AM DO CRISTY [facility]rero [facility]   12/8/2021  2:30 PM [facility]           Patient Active Problem List   Diagnosis    History of tubal ligation    Vision abnormalities    Herpes simplex type 1 infection    History of endometrial ablation    Hyperlipidemia with target LDL less than 100    Hyperglycemia    Prediabetes    Severe obesity (BMI 35.0-35.9 with comorbidity) ([facility])    History of kidney stones    Snoring    Asthma, exercise induced    History of colon polyps    Slow transit constipation    Anxiety    Heart palpitations    Chronic fatigue    Hot flashes    Current mild episode of major depressive disorder without prior episode (HCC)    Abnormal EKG    Family history of premature CAD    History of uterine fibroid    Enlarged tonsils    Status post appendectomy    Vitamin B 12 deficiency       Prior to Visit Medications    Medication Sig Taking?  Authorizing Provider   Semaglutide, 1 MG/DOSE, 2 MG/1.5ML SOPN Inject 1 mg into the skin once a week Please read the Jeanne Boyer MD   cyanocobalamin ([facility]S VITAMIN B12) 1000 MCG tablet Take 1 tablet by mouth daily Yes Katia Navarrete MD   Insulin Pen Needle 31G X 5 MM MISC 1 each by Does not apply route once a week With Ozempic Yes Katia Navarrete MD   fluticasone (FLONASE) 50 MCG/ACT nasal spray 2 sprays by Nasal route nightly Yes Katia Navarrete MD   Magnesium 400 MG CAPS Take by mouth Yes Historical Provider, MD   albuterol sulfate HFA (PROAIR HFA) 108 (90 Base) MCG/ACT inhaler Inhale 2 puffs into the lungs every 6 hours as needed for Wheezing or Shortness of Breath (cough) Yes Latanya Gutiérrez MD        No Known Allergies    Past Medical History:   Diagnosis Date    Anxiety 9/20/2019    Asthma     Current mild episode of major depressive disorder without prior episode ([facility] [address] 2/13/2020    Dyslipidemia (high LDL; low HDL) 5/22/2017    History of endometrial ablation     History of kidney stones 5/22/2017    History of tubal ligation     Hyperglycemia 5/22/2017    Hypertriglyceridemia 5/22/2017    Hypomagnesemia 2/14/2020    Obesity (BMI 30-39. 9) 5/22/2017    Pre-syncope 2/13/2020    Vision abnormalities     glasses/contacts       Past Surgical History:   Procedure Laterality Date    APP[facility]  07/[facility]/2021    [facility]    COLONOSCOPY      COLONOSCOPY  06/28/2019    COLONOSCOPY N/A 06/28/2019    COLORECTAL CANCER SCREENING, NOT HIGH RISK performed by Jalil Wilcox MD at [address] [place] [address] ([facility]) LITHOTRIPSY      TUBAL LIGATION      TUBAL LIGATION      WISDOM TOOTH EXTRACTION         . Social History     Tobacco Use    Smoking status: Never Smoker    Smokeless tobacco: Never Used   Vaping Use    Vaping Use: Never used   Substance Use Topics    Alcohol use: Yes     Alcohol/week: 0.0 standard drinks     Comment: Socially    Drug use: No       Family History   Problem Relation Age of Onset    Diabetes Father         possible type 1    Heart Attack Father 35    Bipolar Disorder Sister     Lung Cancer Paternal Grandmother     Heart Attack Paternal Grandfather     Colon Cancer Neg Hx     Breast Cancer Neg Hx        ADVANCE DIRECTIVE: N, <no information>    SUBJECTIVE / Juancarlos Door is here for Annual Exam and Hyperlipidemia       Had Appendectomy on 7/20/21 in [place]    She feels well now  Had 2 bowel movements already, normal , passing gas  Eating well  Denies fever, chills, night sweats.   Denies vomitting  abdominal pain 5/10 with pressure  Had a bit of nausea yesterday, but had Ozempic yesterday, might have caused her nausea    Prediabetes   Got Ozempic yesterday  I informed her about the black box on Ozempic that can increase the risk of cancers, patientverbalizes understanding     She works as [employer] part time, and has been off work for 2 weeks  The surgeon gave 2 weeks, 8/1-8/16/2021         Urinary symptoms: no    Sexually active: Yes     last pap: was normal 7/28/21  The patient has  history of abnormal PAP    Last mammogram: will get mammogram  The patient has  NO family history of breast cancer    Wears seatbelts: yes     Regular exercise: yes  Ever been transfused or tattooed?: no  The patient reports that domestic violence in her life is absent. Last eye exam: up to date: Yes    Abnormal visual screening. Lamont Stewart  reports she is up-to-date with her eye exam.      Hearing Screening    125Hz 250Hz 500Hz 1000Hz 2000Hz 3000Hz 4000Hz 6000Hz 8000Hz   Right ear:            Left ear:               Visual Acuity Screening    Right eye Left eye Both eyes   Without correction:      With correction: 20/25 20/20 20/20       Hearing:normal :Yes    Last dental exam and preventative dental cleaning: up to date : Yes    Nutritional assessment: Body mass index is 35.33 kg/m². Obesity per BMI. Weight is improving has lost 20 pounds in 6 months  Wt Readings from Last 3 Encounters:   08/05/21 187 lb (84.8 kg)   07/23/21 187 lb (84.8 kg)   06/03/21 193 lb 11.2 oz (87.9 kg)     02/05/21 194 lb (88 kg)   01/18/21 192 lb (87.1 kg)   01/05/21 192 lb 11.2 oz (87.4 kg)        02/13/20 206 lb (93.4 kg)        Healthful diet and Physical activity counseling to prevent CVD- low carb, low fat diet, increase fruits and vegetables, and exercise 4-5 times a day 30-40minutes a day discussed    Nicotine dependence.  no  Counseling given: Yes      Alcohol use: yes - occasionally rarely     Immunization History   Administered Date(s) Administered    COVID-19, Moderna, PF, 100mcg/0.5mL 04/21/2021, 04/21/2021, 05/19/2021, 05/19/2021    Influenza Virus Vaccine 10/06/2019    Influenza, Debbie Michael, IM, PF (6 mo and older Fluzone, Flulaval, Fluarix, and 3 yrs and older Afluria) 10/06/2019, 10/06/2019, 11/03/2020    Pneumococcal Polysaccharide ([id number]) 03/08/2019    Td, unspecified formulation 02/14/2017       COVID-19 vaccine:  Yes     Tdap one time, then Td every 10 years-up to date:  Yes    Influenza annually-up to date:  Yes    PPSV 23 in all adults 19-65 yo with chronic conditions,smokers, alcoholism,  nursing home residents; then PCV 13 at 73 yo-up to date: Yes     Menopause: No   No LMP recorded. Patient has had an ablation.      Colon cancer screening recommended at 39years old -up to date had colonoscopy    The patient has NO family history of colon cancer    Blood pressure is normal.  BP Readings from Last 3 Encounters:   08/05/21 116/78   07/23/21 120/84   06/03/21 (!) 140/95       Pulse is normal.  Pulse Readings from Last 3 Encounters:   08/05/21 74   06/03/21 67   02/05/21 95     Has been taking Ozempic weekly, tolerated well, reports nausea from it but only one time  A1c is  Improved, prediabetes improved    Lab Results   Component Value Date    LABA1C 5.6 07/23/2021    LABA1C 5.9 11/03/2020    LABA1C 6.0 02/14/2020       Lipid screening hyperlipidemia and high triglycerides improving  Lab Results   Component Value Date    CHOL 227 (H) 02/14/2020    CHOL 194 11/03/2018    CHOL 226 (H) 05/22/2017     Lab Results   Component Value Date    TRIG 262 (H) 02/14/2020    TRIG 156 (H) 11/03/2018    TRIG 246 (H) 05/22/2017     Lab Results   Component Value Date    HDL 47 07/23/2021    HDL 42 02/14/2020    HDL 42 11/03/2018     Lab Results   Component Value Date    LDLCHOLESTEROL 122 07/23/2021    LDLCHOLESTEROL 133 (H) 02/14/2020    LDLCHOLESTEROL 121 11/03/2018     Lab Results   Component Value Date    CHOLHDLRATIO 4.5 07/23/2021    CHOLHDLRATIO 5.4 (H) 02/14/2020    CHOLHDLRATIO 4.6 11/03/2018       Cardiovascular risk is: Low    The 10-year ASCVD risk score (Poncho Woods, et al., 2013) is: 1.1%    Values used to calculate the score:      Age: 51 years      Sex: Female      Is Non- : No      Diabetic: No      Tobacco smoker: No      Systolic Blood Pressure: 269 mmHg      Is BP treated: No      HDL Cholesterol: 47 mg/dL      Total Cholesterol: 211 mg/dL    Hepatitis C screening-nonreactive  Lab Results   Component Value Date    HEPCAB NONREACTIVE 07/23/2021            [x]Negative depression screening. PHQ Scores 8/5/2021 7/23/2021 2/5/2021 7/10/2020 5/21/2020 2/13/2020 2/8/2019   PHQ2 Score 0 0 0 0 0 3 0   PHQ9 Score 0 0 0 0 0 8 0       Health Maintenance   Topic Date Due    DTaP/Tdap/Td vaccine (1 - Tdap) 07/03/2022 (Originally 2/15/2017)    Flu vaccine (1) 09/01/2021    A1C test (Diabetic or Prediabetic)  07/23/2022    Lipid screen  07/23/2026    Cervical cancer screen  07/28/2026    Colon cancer screen colonoscopy  06/28/2029    Pneumococcal 0-64 years Vaccine (2 of 2 - PPSV23) 02/22/2039    COVID-19 Vaccine  Completed    Hepatitis C screen  Completed    HIV screen  Completed    Hepatitis A vaccine  Aged Out    Hepatitis B vaccine  Aged Out    Hib vaccine  Aged Out    Meningococcal (ACWY) vaccine  Aged Out       -rest of complaints with corresponding details per ROS    Review of Systems   Constitutional: Negative for activity change, appetite change, chills, diaphoresis, fatigue, fever and unexpected weight change. HENT: Negative for congestion, dental problem and hearing loss. Eyes: Positive for visual disturbance. Respiratory: Negative for cough, chest tightness, shortness of breath and wheezing. Cardiovascular: Negative for chest pain, palpitations and leg swelling. Gastrointestinal: Positive for abdominal pain. Negative for abdominal distention, blood in stool, constipation, diarrhea, nausea and vomiting.    Endocrine: Negative for cold intolerance, heat intolerance, polydipsia, polyphagia and polyuria. Genitourinary: Negative for difficulty urinating, dysuria, frequency, urgency and vaginal pain. Musculoskeletal: Negative for arthralgias, back pain and myalgias. Skin: Negative for rash. Allergic/Immunologic: Negative for environmental allergies and immunocompromised state. Neurological: Negative for dizziness, weakness and numbness. Hematological: Does not bruise/bleed easily. Psychiatric/Behavioral: Negative for dysphoric mood and sleep disturbance. The patient is not nervous/anxious.          -vital signs stable and within normal limits except obesity per BMI  /78   Pulse 74   Temp 96.8 °F (36 °C)   Ht 5' 1\" (1.549 m)   Wt 187 lb (84.8 kg)   SpO2 98%   BMI 35.33 kg/m²   Vitals:    08/05/21 1502   BP: 116/78   Pulse: 74   Temp: 96.8 °F (36 °C)   SpO2: 98%   Weight: 187 lb (84.8 kg)   Height: 5' 1\" (1.549 m)     Estimated body mass index is 35.33 kg/m² as calculated from the following:    Height as of this encounter: 5' 1\" (1.549 m). Weight as of this encounter: 187 lb (84.8 kg). Physical Exam  Vitals and nursing note reviewed. Constitutional:       General: She is not in acute distress. Appearance: Normal appearance. She is well-developed. She is obese. She is not diaphoretic. HENT:      Head: Normocephalic and atraumatic. Right Ear: Hearing, tympanic membrane, ear canal and external ear normal.      Left Ear: Hearing, tympanic membrane, ear canal and external ear normal.      Nose: No mucosal edema. Mouth/Throat:      Comments: I did not examine the mouth due to coronavirus pandemic and wearing masks. Eyes:      General: Lids are normal. No scleral icterus. Right eye: No discharge. Left eye: No discharge. Extraocular Movements: Extraocular movements intact. Conjunctiva/sclera: Conjunctivae normal.   Neck:      Thyroid: No thyromegaly.    Cardiovascular:      Rate and Rhythm: Normal rate and regular rhythm. Heart sounds: Normal heart sounds. No murmur heard. Pulmonary:      Effort: Pulmonary effort is normal. No respiratory distress. Breath sounds: Normal breath sounds. No wheezing or rales. Chest:      Chest wall: No tenderness. Abdominal:      General: Bowel sounds are normal. There is no distension. Palpations: Abdomen is soft. There is no hepatomegaly or splenomegaly. Tenderness: There is no abdominal tenderness. Comments: Surgical incisions post laparoscopic intervention for appendicitis, well healed, with glue, soft abdomen, nondistended   Musculoskeletal:         General: No tenderness. Normal range of motion. Cervical back: Normal range of motion and neck supple. Right lower leg: No edema. Left lower leg: No edema. Skin:     General: Skin is warm and dry. Capillary Refill: Capillary refill takes less than 2 seconds. Findings: No rash. Neurological:      Mental Status: She is alert and oriented to person, place, and time. Cranial Nerves: No cranial nerve deficit. Motor: No abnormal muscle tone. Gait: Gait normal.      Deep Tendon Reflexes: Reflexes normal.      Reflex Scores:       Patellar reflexes are 2+ on the right side and 2+ on the left side. Psychiatric:         Mood and Affect: Mood normal.         Behavior: Behavior normal.         Thought Content: Thought content normal.         Judgment: Judgment normal.         I personally reviewed testing with patient.   Prediabetes improved  Hyperlipidemia  Vitamin B12 deficiency    Otherwise labs within normal limits      Lab Results   Component Value Date    WBC 6.0 07/23/2021    HGB 12.8 07/23/2021    HCT 41.4 07/23/2021    MCV 95.6 07/23/2021     07/23/2021       Lab Results   Component Value Date     07/23/2021    K 4.2 07/23/2021    CL 99 07/23/2021    CO2 22 07/23/2021    BUN 9 07/23/2021    CREATININE 0.72 07/23/2021 GLUCOSE 80 07/23/2021    GLUCOSE 90 12/16/2011    CALCIUM 9.5 07/23/2021        Lab Results   Component Value Date    ALT 13 07/23/2021    AST 11 07/23/2021    ALKPHOS 56 07/23/2021    BILITOT 0.31 07/23/2021       Lab Results   Component Value Date    TSH 0.94 07/23/2021       Lab Results   Component Value Date    LDLCHOLESTEROL 122 07/23/2021       Lab Results   Component Value Date    LABA1C 5.6 07/23/2021       Lab Results   Component Value Date    SOPTHUGX21 276 07/23/2021       Lab Results   Component Value Date    FOLATE 10.2 07/23/2021         Orders Placed This Encounter   Medications    Semaglutide, 1 MG/DOSE, 2 MG/1.5ML SOPN     Sig: Inject 1 mg into the skin once a week Please read the BLACK BOX     Dispense:  3 pen     Refill:  3    cyanocobalamin ([facility] VITAMIN B12) 1000 MCG tablet     Sig: Take 1 tablet by mouth daily     Dispense:  30 tablet     Refill:  3         Medications Discontinued During This Encounter   Medication Reason    nystatin (MYCOSTATIN) 497497 UNIT/ML suspension Therapy completed    amLODIPine (NORVASC) 2.5 MG tablet Patient Choice    pravastatin (PRAVACHOL) 40 MG tablet Patient Choice    metFORMIN (GLUCOPHAGE-XR) 500 MG extended release tablet Patient Choice    busPIRone (BUSPAR) 7.5 MG tablet Patient Choice    Semaglutide, 1 MG/DOSE, 2 MG/1.5ML SOPN REORDER           This note was completed by using the assistance of a speech-recognition program. However, inadvertent computerized transcription errors may be present. Although every effort was made to ensure accuracy, no guarantees can be provided that every mistake has been identified and corrected by editing. An electronic signature was used to authenticate this note.     Electronically signed by Jorge Tirado MD on 8/8/2021 at 10:36 AM

## 2021-08-05 NOTE — PROGRESS NOTES
Visit Information    Have you changed or started any medications since your last visit including any over-the-counter medicines, vitamins, or herbal medicines? no   Are you having any side effects from any of your medications? -  no  Have you stopped taking any of your medications? Is so, why? -  no    Have you seen any other physician or provider since your last visit? yes  Have you had any other diagnostic tests since your last visit? No  Have you been seen in the emergency room and/or had an admission to a hospital since we last saw you? Yes - Records Obtained  Have you had your routine dental cleaning in the past 6 months? yes     Have you activated your [employer] account? If not, what are your barriers?  Yes     Patient Care Team:  Emily Mckeon MD as PCP - General (Family Medicine)  Emily Mckeon [facility]  MATT Ernandez CNP as Nurse Practitioner (Certified Nurse Practitioner)  Jordin Quick MD as Consulting Physician (Urology)  MATT Holliday CNP (Cardiology)  Opal Tinoco MD as Consulting Physician (Cardiology)    Medical History Review  Past Medical, Family, and Social History reviewed and does contribute to the patient presenting condition    Health Maintenance   Topic Date Due    DTaP/Tdap/Td vaccine (1 - Tdap) 07/03/2022 (Originally 2/15/2017)    Flu vaccine (1) 09/01/2021    A1C test (Diabetic or Prediabetic)  07/23/2022    Lipid screen  07/23/2022    Cervical cancer screen  07/28/2026    Colon cancer screen colonoscopy  06/28/2029    Pneumococcal 0-64 years Vaccine (2 of 2 - PPSV23) 02/22/2039    COVID-19 Vaccine  Completed    Hepatitis C screen  Completed    HIV screen  Completed    Hepatitis A vaccine  Aged Out    Hepatitis B vaccine  Aged Out    Hib vaccine  Aged Out    Meningococcal (ACWY) vaccine  Aged Out

## 2021-08-08 PROBLEM — E53.8 VITAMIN B 12 DEFICIENCY: Status: ACTIVE | Noted: 2021-08-08

## 2021-08-19 DIAGNOSIS — Z12.31 ENCOUNTER FOR SCREENING MAMMOGRAM FOR BREAST CANCER: Primary | ICD-10-CM

## 2021-08-28 DIAGNOSIS — R73.03 PREDIABETES: Primary | ICD-10-CM

## 2021-08-29 RX ORDER — SEMAGLUTIDE 1.34 MG/ML
INJECTION, SOLUTION SUBCUTANEOUS
Qty: 9 ML | Refills: 5 | OUTPATIENT
Start: 2021-08-29

## 2021-08-29 NOTE — TELEPHONE ENCOUNTER
Please Approve or Refuse.   Send to Pharmacy per Pt's Request:      Next Visit Date:  12/8/2021   Last Visit Date: 8/5/2021    Hemoglobin A1C (%)   Date Value   07/23/2021 5.6   11/03/2020 5.9   02/14/2020 6.0             ( goal A1C is < 7)   BP Readings from Last 3 Encounters:   08/05/21 116/78   07/23/21 120/84   06/03/21 (!) 140/95          (goal 120/80)  BUN   Date Value Ref Range Status   07/23/2021 9 6 - 20 mg/dL Final     CREATININE   Date Value Ref Range Status   07/23/2021 0.72 0.50 - 0.90 mg/dL Final     Potassium   Date Value Ref Range Status   07/23/2021 4.2 3.7 - 5.3 mmol/L Final

## 2021-09-11 ENCOUNTER — PATIENT MESSAGE (OUTPATIENT)
Dept: FAMILY MEDICINE CLINIC | Age: 46
End: 2021-09-11

## 2021-09-13 NOTE — TELEPHONE ENCOUNTER
Needs appointment for Adipexx3, 1 month apart, okay for video only if she has weight, pulse, and blood pressure available and she has made a lot of effort to lose weight before  Please schedule with me and Anais, again video ok      Future Appointments   Date Time Provider Diamond Gonzalez   10/21/2021 11:00 AM Ree Henry   12/8/2021  2:30 PM [facility]

## 2021-09-13 NOTE — TELEPHONE ENCOUNTER
From: Lamnot Stewart  To: Lynann Apgar, MD  Sent: 2021 5:20 PM EDT  Subject: Prescription Question    Hi I was wondering if I can get on adipex?

## 2021-09-17 ENCOUNTER — E-VISIT (OUTPATIENT)
Dept: FAMILY MEDICINE CLINIC | Age: 46
End: 2021-09-17

## 2021-09-17 NOTE — PROGRESS NOTES
Needs appointment  Adipex  Error  This is not for allergies.  I need an appt for adipex Dr Rekha Johnson an Evisit if i could weigh myself and show blood pressure and hearty rate    Future Appointments   Date Time Provider Diamond Gonzalez   9/30/2021  8:00 AM Mateus Young MD [facility]   10/21/2021 11:00 AM Ree Prather   12/8/2021  2:30 PM Mateus Young MD [facility]

## 2021-09-29 NOTE — PROGRESS NOTES
Visit Information    Have you changed or started any medications since your last visit including any over-the-counter medicines, vitamins, or herbal medicines? no   Have you stopped taking any of your medications? Is so, why? -  no  Are you having any side effects from any of your medications? - no    Have you seen any other physician or provider since your last visit?  no   Have you had any other diagnostic tests since your last visit?  no   Have you been seen in the emergency room and/or had an admission in a hospital since we last saw you?  no   Have you had your routine dental cleaning in the past 6 months?  yes -      Do you have an active MyChart account? If no, what is the barrier?   Yes    Patient Care Team:  Siddhartha Little MD as PCP - General (Family Medicine)  Siddhartha Little MD as PCP - [facility]  Coit Sever, APRN - CNP as Nurse Practitioner (Certified Nurse Practitioner)  Weston Jerome MD as Consulting Physician (Urology)  MATT Jerome CNP (Cardiology)  Beck Betancourt MD as Consulting Physician (Cardiology)  Barby Schneider DO as Consulting Physician (Obstetrics & Gynecology)    Medical History Review  Past Medical, Family, and Social History reviewed and does contribute to the patient presenting condition    Health Maintenance   Topic Date Due    Flu vaccine (1) 09/01/2021    DTaP/Tdap/Td vaccine (1 - Tdap) 07/03/2022 (Originally 2/15/2017)    A1C test (Diabetic or Prediabetic)  07/23/2022    Cervical cancer screen  07/10/2025    Lipid screen  07/23/2026    Colon cancer screen colonoscopy  06/28/2029    Pneumococcal 0-64 years Vaccine (2 of 2 - PPSV23) 02/22/2039    COVID-19 Vaccine  Completed    Hepatitis C screen  Completed    HIV screen  Completed    Hepatitis A vaccine  Aged Out    Hepatitis B vaccine  Aged Out    Hib vaccine  Aged Out    Meningococcal (ACWY) vaccine  Aged Out

## 2021-09-30 ENCOUNTER — TELEMEDICINE (OUTPATIENT)
Dept: FAMILY MEDICINE CLINIC | Age: 46
End: 2021-09-30
Payer: COMMERCIAL

## 2021-09-30 DIAGNOSIS — E66.01 SEVERE OBESITY (BMI 35.0-35.9 WITH COMORBIDITY) (HCC): ICD-10-CM

## 2021-09-30 DIAGNOSIS — F32.5 MAJOR DEPRESSIVE DISORDER WITH SINGLE EPISODE, IN FULL REMISSION (HCC): ICD-10-CM

## 2021-09-30 DIAGNOSIS — J45.990 ASTHMA, EXERCISE INDUCED: Primary | ICD-10-CM

## 2021-09-30 DIAGNOSIS — E78.5 HYPERLIPIDEMIA WITH TARGET LDL LESS THAN 100: ICD-10-CM

## 2021-09-30 DIAGNOSIS — R73.03 PREDIABETES: ICD-10-CM

## 2021-09-30 PROCEDURE — 99214 OFFICE O/P EST MOD 30 MIN: CPT | Performed by: FAMILY MEDICINE

## 2021-09-30 RX ORDER — MONTELUKAST SODIUM 4 MG/1
4 TABLET, CHEWABLE ORAL NIGHTLY
Qty: 30 TABLET | Refills: 3 | Status: SHIPPED | OUTPATIENT
Start: 2021-09-30 | End: 2022-05-04 | Stop reason: SDUPTHER

## 2021-09-30 RX ORDER — ALBUTEROL SULFATE 90 UG/1
2 AEROSOL, METERED RESPIRATORY (INHALATION) EVERY 6 HOURS PRN
Qty: 8 G | Refills: 3 | Status: SHIPPED | OUTPATIENT
Start: 2021-09-30 | End: 2022-10-14 | Stop reason: SDUPTHER

## 2021-09-30 RX ORDER — PHENTERMINE HYDROCHLORIDE 37.5 MG/1
37.5 TABLET ORAL
Qty: 30 TABLET | Refills: 0 | Status: SHIPPED | OUTPATIENT
Start: 2021-09-30 | End: 2021-10-28 | Stop reason: SDUPTHER

## 2021-09-30 ASSESSMENT — ENCOUNTER SYMPTOMS
DIARRHEA: 0
ABDOMINAL DISTENTION: 0
CONSTIPATION: 0
COUGH: 0
SHORTNESS OF BREATH: 0
WHEEZING: 1
CHEST TIGHTNESS: 0
NAUSEA: 0
ABDOMINAL PAIN: 0
VOMITING: 0

## 2021-09-30 NOTE — PROGRESS NOTES
2021    TELEHEALTH EVALUATION -- Audio/Visual (During [id number] public health emergency)      Gonzalo Espinoza (:  1975) is a 51 y.o. female,Established patient, here for evaluation of the following chief complaint(s): Weight Management (WOULD LIKE TO DISCUSS ADIPEX), Immunizations (YES TO COVID-19 VACCINE // WILL SEND PROOF ), and Asthma        ASSESSMENT/PLAN:    1. Asthma, exercise induced  Stable, but not Well controlled. -     albuterol sulfate HFA (PROAIR HFA) 108 (90 Base) MCG/ACT inhaler; Inhale 2 puffs into the lungs every 6 hours as needed for Wheezing or Shortness of Breath (cough), Disp-8 g, R-3Normal  -    To start montelukast (SINGULAIR) 4 MG chewable tablet; Take 1 tablet by mouth nightly, Disp-30 tablet, R-3Normal  Continue weight loss    2. Severe obesity (BMI 35.0-35.9 with comorbidity) ([facility])  improving  -     phentermine (ADIPEX-P) 37.5 MG tablet; Take 1 tablet by mouth every morning (before breakfast) for 30 days. , Disp-30 tablet, R-0Normal    Patient was asked about her current diet and exercise habits, and personalized advice was provided regarding recommended lifestyle changes. Patient's comorbid health conditions associated with elevated BMI were discussed, including COPD/asthma, dyslipidemia, hyperglycemia and mood disorder, as well as the likely benefits of weight loss. Based upon patient's motivation to change her behavior, the following plan was agreed upon to work toward a weight loss goal of 1-2 pounds/week: low carbohydrate diet, wear a pedometer and get at least 10,000 steps per day, exercise for at least 30 minutes 4-5 days per week and medication prescribed: Adipex. Educational materials for  weight loss were provided. Patient will follow-up in 1 month(s) with PCP. Provider spent  10 minutes counseling patient.     3. Hyperlipidemia with target LDL less than 100  Inadequately controlled  Low carb, low fat diet, increase fruits and vegetables, and exercise 4-5 times a week 30-40 minutes a day, or walk 1-2 hours per day, or wear a pedometer and get at least 10,000 steps per day. Would benefit from weight loss    Lab Results   Component Value Date    LDLCHOLESTEROL 122 07/23/2021       4. Prediabetes  Improved  Off Ozempic  Lab Results   Component Value Date    LABA1C 5.6 07/23/2021    LABA1C 5.9 11/03/2020    LABA1C 6.0 02/14/2020     Continue low-carb diet, attempt to lose weight, and exercise  5. Major depressive disorder with single episode, in full remission ([facility] [address]  Stable  We will continue to monitor and lose weight    PHQ Scores 9/29/2021 8/5/2021 7/23/2021 2/5/2021 7/10/2020 5/21/2020 2/13/2020   PHQ2 Score 0 0 0 0 0 0 3   PHQ9 Score 0 0 0 0 0 0 8     Controlled Substance Monitoring:    Acute and Chronic Pain Monitoring:   RX Monitoring 9/30/2021   Attestation -   Periodic Controlled Substance Monitoring Possible medication side effects, risk of tolerance/dependence & alternative treatments discussed. ;No signs of potential drug abuse or diversion identified. ;Assessed functional status. Chronic Pain > 50 MEDD -         Crystal received counseling on the following healthy behaviors: nutrition, exercise, medication adherence and weight loss    Reviewed prior labs and health maintenance  Discussed use, benefit, and side effects of prescribed medications. Barriers to medication compliance addressed. Patient given educational materials - see patient instructions  All patient questions answered. Patient voiced understanding. The patient's past medical,surgical, social, and family history as well as her current medications and allergies were reviewed as documented in today's encounter. Medications, labs, diagnostic studies, consultations and follow-up as documented in this encounter. Return in about 4 weeks (around 10/28/2021) for ADHD LMP, what contraception?, **Do EXERCISE FLOWSHEET in [place].     [place] for flu shot for tomorrow    Future Appointments   Date Time Provider Diamond Gonzalez   10/1/2021  9:45 AM [facility]EDU[facility]   10/7/2021  8:30 AM DO CRISTY [facility]   10/28/2021  8:00 AM [facility]   2021  1:30 PM [facility]   2021  2:30 PM [facility]         SUBJECTIVE/OBJECTIVE:  Akilahsarahjennifer Vásquez (:  1975) has requested an audio/video evaluation for the following concern(s):Weight Management (WOULD LIKE TO DISCUSS ADIPEX), Immunizations (YES TO COVID-19 VACCINE // WILL SEND PROOF ), and Asthma      Wants to lose weight. Patient is here for Adipex No. 1.  She received last prescription in January    Celia Mantilla has made a substantial good marie effort to lose weight in a regimen for weight reduction based on caloric restriction, nutritional changes, and exercise  Celia Comer reports she did: Cut down the carbs and started to exercise more  She says she has been drinking more water  She has stopped Ozempic, but she feels it helped her curb her appetite  Patient is also planning to cut down the portions and will not eat after 7 pm  Says will cut down carbs and drinking more water    Contraindications to controlled substance anorexiant: NONE    Celia Mantilla reports no  use of illegal drug substances  Celia Comer reports alcohol use of :none      Urine drug test done-will do in the future    LMP:2021  Contraceptive method: tubal ligation and ablation    Patient informed that when prescribed a controlled substance for weight loss, the provider is required by law to see the patient for an appointment every thirty days. This is neccessary to record the weight and blood pressure and to assess patient's efforts to lose weight, and to ensure there are no contraindications or adverse effects.    Patient advised contraception during the time of taking this medication, advised no alcohol use during this time    OARRS done today and okay  Plan: diet, exercise and start Adipex          blood pressure is Normal.  124/88 mmHg  BP Readings from Last 3 Encounters:   08/05/21 116/78   07/23/21 120/84   06/03/21 (!) 140/95        Pulse is Normal.  Pulse Readings from Last 3 Encounters:   08/05/21 74   06/03/21 67   02/05/21 95       Reports 186 pounds, she says she has lost 1 pound on her own based on her scale in 1 month. Wt Readings from Last 3 Encounters:   08/05/21 187 lb (84.8 kg)   07/23/21 187 lb (84.8 kg)   06/03/21 193 lb 11.2 oz (87.9 kg)         There is no height or weight on file to calculate BMI.  5000 steps a day on [employer]      Exercise Tracking - Detailed 9/30/2021   Walking Duration 30   Walking Intensity Moderate   Walking Times/Week 3   Biking Duration -   Biking Times/Week -   Running Duration -   Running Times/Week -   Swimming Duration -   Swimming Times/Week -   Cardiovascular Equipment Duration -   Cardiovascular Equipment Times/Week -   Exercise Classes / Videos Duration -   Exercise Classes / Videos Intensity -   Exercise Classes / Videos Times/Week -   Strength Training Duration -   Strength Training Times/Week -   Other Exercise Comment -   Other Duration -   Other Intensity -   Other Times/Week -   Pedometer Steps/Day 5000   Total Exercise Minutes/Week 90         Asthma exercise induced:  Current treatment includes beta agonist inhalers. Using preventive medication(s) consistently: no- N/A. Residual symptoms: wheezing when exercising. Patient denies chest pain/tightness, dyspnea on exertion, cough. She requires her rescue inhaler 3 time(s) per week only when working out, has been working out 3 times a week      Nicotine dependence. No she does not smoke  She quit smoking. Counseling given: Yes    Hyperlipidemia:  Patient is  following a low fat, low cholesterol diet. She is  exercising regularly.  OTC Supplements: None    LDL is high and high triglycerides  Lab Results   Component Value Date    LDLCHOLESTEROL 122 07/23/2021     Lab Results   Component Value Date    TRIG 262 (H) 02/14/2020    TRIG 156 (H) 11/03/2018    TRIG 246 (H) 05/22/2017         Prediabetes has improved with Ozempic and lifestyle changes  Denies increased appetite, thirst or polyuria. Lab Results   Component Value Date    LABA1C 5.6 07/23/2021    LABA1C 5.9 11/03/2020    LABA1C 6.0 02/14/2020                 Depression has improved    She is more anxious in general, does not feel depressed  PHQ-2 Over the past 2 weeks, how often have you been bothered by any of the following problems? Little interest or pleasure in doing things: Not at all  Feeling down, depressed, or hopeless: Not at all  PHQ-2 Score: 0  PHQ-9 Over the past 2 weeks, how often have you been bothered by any of the following problems? PHQ-9 Total Score: 0      PHQ Scores 9/29/2021 8/5/2021 7/23/2021 2/5/2021 7/10/2020 5/21/2020 2/13/2020   PHQ2 Score 0 0 0 0 0 0 3   PHQ9 Score 0 0 0 0 0 0 8       Review of Systems   Constitutional: Negative for activity change, appetite change, chills, diaphoresis, fatigue, fever and unexpected weight change. Respiratory: Positive for wheezing. Negative for cough, chest tightness and shortness of breath. Cardiovascular: Negative for chest pain, palpitations and leg swelling. Gastrointestinal: Negative for abdominal distention, abdominal pain, constipation, diarrhea, nausea and vomiting. Endocrine: Negative for cold intolerance, heat intolerance, polydipsia, polyphagia and polyuria. Psychiatric/Behavioral: Negative for dysphoric mood and sleep disturbance. The patient is nervous/anxious. Prior to Visit Medications    Medication Sig Taking?  Authorizing Provider   cyanocobalamin ([employer] VITAMIN B12) 1000 MCG tablet Take 1 tablet by mouth daily Yes Lotus Schneider MD   fluticasone (FLONASE) 50 MCG/ACT nasal spray 2 sprays by Nasal route nightly Yes Lotus Schneider MD   Magnesium 400 MG CAPS Take by mouth Yes Historical Provider, MD   Insulin Pen Needle 31G X 5 MM MISC 1 each by Does not apply route once a week With Ozempic  Patient not taking: Reported on 9/29/2021  Lotus Schneider MD   albuterol sulfate HFA (PROAIR HFA) 108 (90 Base) MCG/ACT inhaler Inhale 2 puffs into the lungs every 6 hours as needed for Wheezing or Shortness of Breath (cough)  Patient not taking: Reported on 9/29/2021  Lotus Schneider MD       Social History     Tobacco Use    Smoking status: Never Smoker    Smokeless tobacco: Never Used   Vaping Use    Vaping Use: Never used   Substance Use Topics    Alcohol use: Yes     Alcohol/week: 0.0 standard drinks     Comment: Socially    Drug use: No          PHYSICAL EXAMINATION:    Vital Signs: (As obtained by patient/caregiver or practitioner observation)  -vital signs stable and within normal limits except obesity per BMI with BMI of 35.1 kg/m2    Patient-Reported Vitals 9/30/2021   Patient-Reported Weight 186 lbs   Patient-Reported Height 5 ft 1 in   Patient-Reported Systolic 857   Patient-Reported Diastolic 88   Patient-Reported Pulse 74   Patient-Reported Temperature -   Patient-Reported SpO2 -   Patient-Reported Peak Flow -           Intensity of pain is 0 out of 10      Constitutional: [x] Appears well-developed and well-nourished [x] No apparent distress      [x] Abnormal-obese      Mental status  [x] Alert and awake  [x] Oriented to person/place/time []Able to follow commands      Eyes:  EOM    [x]  Normal  [] Abnormal-  Sclera  [x]  Normal  [] Abnormal -         Discharge [x]  None visible  [] Abnormal -    HENT:   [x] Normocephalic, atraumatic.   [] Abnormal   [x] Mouth/Throat: Mucous membranes are moist.     External Ears [x] Normal  [] Abnormal-     Neck: [x] No visualized mass     Pulmonary/Chest: [x] Respiratory effort normal.  [x] No visualized signs of difficulty breathing or respiratory distress        [] Abnormal        Musculoskeletal:   [x] Normal gait with no signs of ataxia         [x] Normal range of motion of neck        [] Abnormal-       Neurological:        [x] No Facial Asymmetry (Cranial nerve 7 motor function) (limited exam to video visit)          [x] No gaze palsy        [] Abnormal-            Skin:        [x] No significant exanthematous lesions or discoloration noted on facial skin         [] Abnormal-            Psychiatric:      [x] No Hallucinations       [x]Mood is normal      [x]Behavior is normal      [x]Judgment is normal      [x]Thought content is normal       [] Abnormal-     Other pertinent observable physical exam findings- none    Due to this being a TeleHealth encounter, evaluation of the following organ systems is limited: Vitals/Constitutional/EENT/Resp/CV/GI//MS/Neuro/Skin/Heme-Lymph-Imm. I personally reviewed most recent labs reviewed with the patient and all questions fully answered.    Prediabetes  Hyperlipidemia  High triglycerides  Vitamin B12 deficiency  Otherwise labs within normal limits        Lab Results   Component Value Date    WBC 6.0 07/23/2021    HGB 12.8 07/23/2021    HCT 41.4 07/23/2021    MCV 95.6 07/23/2021     07/23/2021       Lab Results   Component Value Date     07/23/2021    K 4.2 07/23/2021    CL 99 07/23/2021    CO2 22 07/23/2021    BUN 9 07/23/2021    CREATININE 0.72 07/23/2021    GLUCOSE 80 07/23/2021    GLUCOSE 90 12/16/2011    CALCIUM 9.5 07/23/2021        Lab Results   Component Value Date    ALT 13 07/23/2021    AST 11 07/23/2021    ALKPHOS 56 07/23/2021    BILITOT 0.31 07/23/2021       Lab Results   Component Value Date    TSH 0.94 07/23/2021       Lab Results   Component Value Date    CHOL 227 (H) 02/14/2020    CHOL 194 11/03/2018    CHOL 226 (H) 05/22/2017     Lab Results   Component Value Date    TRIG 262 (H) 02/14/2020    TRIG 156 (H) 11/03/2018    TRIG 246 (H) 05/22/2017     Lab Results   Component Value Date    HDL 47 07/23/2021    HDL 42 02/14/2020    HDL 42 11/03/2018     Lab Results Component Value Date    LDLCHOLESTEROL 122 07/23/2021    LDLCHOLESTEROL 133 (H) 02/14/2020    LDLCHOLESTEROL 121 11/03/2018       Lab Results   Component Value Date    CHOLHDLRATIO 4.5 07/23/2021    CHOLHDLRATIO 5.4 (H) 02/14/2020    CHOLHDLRATIO 4.6 11/03/2018       Lab Results   Component Value Date    LABA1C 5.6 07/23/2021    LABA1C 5.9 11/03/2020    LABA1C 6.0 02/14/2020         Lab Results   Component Value Date    VQFLQAHR01 276 07/23/2021       Lab Results   Component Value Date    VITD25 34.0 04/06/2013       Orders Placed This Encounter   Medications    albuterol sulfate HFA (PROAIR HFA) 108 (90 Base) MCG/ACT inhaler     Sig: Inhale 2 puffs into the lungs every 6 hours as needed for Wheezing or Shortness of Breath (cough)     Dispense:  8 g     Refill:  3    montelukast (SINGULAIR) 4 MG chewable tablet     Sig: Take 1 tablet by mouth nightly     Dispense:  30 tablet     Refill:  3    phentermine (ADIPEX-P) 37.5 MG tablet     Sig: Take 1 tablet by mouth every morning (before breakfast) for 30 days. Dispense:  30 tablet     Refill:  0       No orders of the defined types were placed in this encounter. Medications Discontinued During This Encounter   Medication Reason    Insulin Pen Needle 31G X 5 MM MISC Therapy completed    albuterol sulfate HFA (PROAIR HFA) 108 (90 Base) MCG/ACT inhaler REORDER              Crystal Hammad Six, was evaluated through a synchronous (real-time) audio-video encounter. The patient (or guardian if applicable) is aware that this is a billable service. Verbal consent to proceed has been obtained within the past 12 months. The visit was conducted pursuant to the emergency declaration under the [facility], [address] [place] [address] authority and the [employer] General Act.   Patient identification was verified    Patient was alone   The patient was located in a state where the provider was credentialed to provide care. On this date 9/30/2021 I have spent 35 minutes reviewing previous notes, test results and face to face with the patient discussing the diagnosis and importance of compliance with the treatment plan as well as documenting on the day of the visit. --Jorge Tirado MD on 9/30/2021 at 6:26 PM    An electronic signature was used to authenticate this note.

## 2021-09-30 NOTE — PATIENT INSTRUCTIONS
Patient Education        Learning About Low-Carbohydrate Diets  What is a low-carbohydrate diet? A low-carbohydrate (or \"low-carb\") diet limits foods and drinks that have carbohydrates. This includes grains, fruits, milk and yogurt, and starchy vegetables like potatoes, beans, and corn. It also avoids foods and drinks that have added sugar. Instead, low-carb diets include foods that are high in protein and fat. Why might you follow a low-carb diet? Low-carb diets may be used for a variety of reasons, such as for weight loss. People who have diabetes may use a low-carb diet to help manage their blood sugar levels. What should you do before you start the diet? Talk to your doctor before you try any diet. This is even more important if you have health problems like kidney disease, heart disease, or diabetes. Your doctor may suggest that you meet with a registered dietitian. A dietitian can help you make an eating plan that works for you. What foods do you eat on a low-carb diet? On a low-carb diet, you choose foods that are high in protein and fat. Examples of these are:  · Meat, poultry, and fish. · Eggs. · Nuts, such as walnuts, pecans, almonds, and peanuts. · Peanut butter and other nut butters. · Tofu. · Avocado. · Paul Mare. · Non-starchy vegetables like broccoli, cauliflower, green beans, mushrooms, peppers, lettuce, and spinach. · Unsweetened non-dairy milks like almond milk and coconut milk. · Cheese, cottage cheese, and cream cheese. Current as of: December 17, 2020               Content Version: 13.0  © 2006-2021 [employer]. Care instructions adapted under license by [facility] ([facility]). If you have questions about a medical condition or this instruction, always ask your healthcare professional. [address]  any warranty or liability for your use of this information.

## 2021-10-01 ENCOUNTER — NURSE ONLY (OUTPATIENT)
Dept: FAMILY MEDICINE CLINIC | Age: 46
End: 2021-10-01
Payer: COMMERCIAL

## 2021-10-01 DIAGNOSIS — Z23 NEED FOR INFLUENZA VACCINATION: ICD-10-CM

## 2021-10-01 PROCEDURE — 90471 IMMUNIZATION ADMIN: CPT | Performed by: FAMILY MEDICINE

## 2021-10-01 PROCEDURE — 90674 CCIIV4 VAC NO PRSV 0.5 ML IM: CPT | Performed by: FAMILY MEDICINE

## 2021-10-01 NOTE — PROGRESS NOTES
Diagnosis Orders   1.  Need for influenza vaccination  INFLUENZA, MDCK QUADV, 2 YRS AND OLDER, IM, PF, PREFILL SYR OR SDV, 0.5ML (FLUCELVAX QUADV, PF)        Future Appointments   Date Time Provider Diamond Deutschi   10/7/2021  8:30 AM DO CRISTY Gibson OB[facility]   10/28/2021  8:00 AM MATT Mayer - CNP [facility]   11/23/2021  1:30 PM Ruby Collins MD [facility]   12/8/2021  2:30 PM Ruby Collins MD [facility] [place]

## 2021-10-07 ENCOUNTER — PROCEDURE VISIT (OUTPATIENT)
Dept: OBGYN CLINIC | Age: 46
End: 2021-10-07
Payer: COMMERCIAL

## 2021-10-07 VITALS
SYSTOLIC BLOOD PRESSURE: 114 MMHG | WEIGHT: 187 LBS | HEIGHT: 61 IN | DIASTOLIC BLOOD PRESSURE: 72 MMHG | BODY MASS INDEX: 35.3 KG/M2

## 2021-10-07 DIAGNOSIS — L91.8 MULTIPLE ACQUIRED SKIN TAGS: Primary | ICD-10-CM

## 2021-10-07 PROCEDURE — 56515 DESTROY VULVA LESION/S COMPL: CPT | Performed by: OBSTETRICS & GYNECOLOGY

## 2021-10-07 NOTE — PROGRESS NOTES
The patient was seen today requesting removal of her condyloma. She was offered conservative medical management. She was counseled on the risk and complications of the procedure; bleeding, infection, scarring, painful intercourse, and re-occurrence. She was given  all conservative options. The patient signed the consent form. Blood pressure 114/72, height 5' 1\" (1.549 m), weight 187 lb (84.8 kg), last menstrual period 08/31/2021, not currently breastfeeding. Chaperone for Intimate Exam   Chaperone was offered and accepted as part of the rooming process.  Chaperone: Joaquina         PROCEDURE NOTE:  The patient was sterilely prepped and draped. 1% Lidocaine without epinephrine was used for local anesthetic 1 ml total. The 3 # of lesions had their base instilled and confirmatory check was completed. The lesions were excised with a 11-blade. Anise Yola was applied to control any oozing. Proper hygiene was reviewed. The patient tolerated the procedure well. She is to return for an office visit in 2 week. She is to report any temp more than 100 F , Pain or redness. She is to refrain from intercourse douching or tampons. No baths lakes or pools. Location of Excision:   1. Left buttocks            Assessment:   Diagnosis Orders   1.  Multiple acquired skin tags  IN DESTRUCTION,LESION(S),VULVA;EXTENSIVE     Patient Active Problem List    Diagnosis Date Noted    Vitamin B 12 deficiency 08/08/2021    Status post appendectomy 08/05/2021    Enlarged tonsils 11/03/2020    History of uterine fibroid 06/29/2020    Family history of premature CAD 02/21/2020    Heart palpitations 02/13/2020    Chronic fatigue 02/13/2020    Hot flashes 02/13/2020    Major depressive disorder with single episode, in full remission ([facility] [address] 02/13/2020    Abnormal EKG 02/13/2020    Anxiety 09/20/2019    History of colon polyps 03/10/2019    Slow transit constipation 03/10/2019    Asthma, exercise induced 10/19/2018    Snoring

## 2021-10-18 ENCOUNTER — HOSPITAL ENCOUNTER (OUTPATIENT)
Age: 46
Discharge: HOME | End: 2021-10-18

## 2021-10-18 LAB — RUBV IGG SER QL: 33.9 IU/ML

## 2021-10-18 PROCEDURE — 86787 VARICELLA-ZOSTER ANTIBODY: CPT

## 2021-10-18 PROCEDURE — 86481 TB AG RESPONSE T-CELL SUSP: CPT

## 2021-10-18 PROCEDURE — 86765 RUBEOLA ANTIBODY: CPT

## 2021-10-18 PROCEDURE — 86762 RUBELLA ANTIBODY: CPT

## 2021-10-18 PROCEDURE — 86735 MUMPS ANTIBODY: CPT

## 2021-10-20 LAB
MEASLES ANTIBODY IGG: 1.05
MUV IGG SER QL: 2.41
T-SPOT TB TEST: NORMAL
VZV IGG SER QL IA: 2.29

## 2021-10-25 ENCOUNTER — TELEPHONE (OUTPATIENT)
Dept: FAMILY MEDICINE CLINIC | Age: 46
End: 2021-10-25

## 2021-10-25 NOTE — TELEPHONE ENCOUNTER
Patient needs a booster MMR vaccine  Repeat titers in 1 month, please schedule her appointment  I will order the titers at her next appointment with me  10/20/2021 12:13 PM - Kavita Hughes Incoming Lab Results From [facility]    Component Value Ref Range & Units Status Collected Lab   MEASLES ANTIBODY IGG 1.05Low   >1.09 Final 10/18/2021  2:[address]  [address]            Future Appointments   Date Time Provider Diamond Deutschi   10/28/2021  8:00 AM MATT Mayer - CNP [facility]   11/23/2021  1:30 PM Opal Perry MD [facility]   12/8/2021  2:30 PM Opal Perry MD [facility] [address] [facility]

## 2021-10-25 NOTE — TELEPHONE ENCOUNTER
----- Message from Jessenia Zamora RN sent at 10/25/2021  2:58 PM EDT -----  Regarding: Lab result  Patient is questioning if she needs a booster for her Rubeloa Antibody, IgG

## 2021-10-25 NOTE — TELEPHONE ENCOUNTER
Future Appointments   Date Time Provider Diamond Lisa   10/28/2021  8:00 AM [facility]   10/29/2021  3:00 PM SCHEDULE, [facility]   11/23/2021  1:30 PM MD jluis Newell [facility]   12/8/2021  2:30 PM [facility]

## 2021-10-25 NOTE — TELEPHONE ENCOUNTER
Future Appointments   Date Time Provider Diamond Lisa   10/28/2021  8:00 AM [facility]   10/29/2021  3:00 PM [facility]EDULE[facility]   11/23/2021  1:30 PM [facility]is [facility]to [facility]   12/8/2021  2:30 PM Saul Ha MD [facility]

## 2021-10-26 NOTE — PROGRESS NOTES
[facility] at [address] [address]   O: 211 Samreen Fox is a 51 y.o. female evaluated via telephone on 10/28/2021. CONSENT:  She and/or health care decision maker is aware that that she may receive a bill for this telephone service, depending on her insurance coverage, and has provided verbal consent to proceed: Yes    DOCUMENTATION:  Patient scheduled this appointment today due to Weight Loss  . OBESITY/ENCOUNTER FOR WEIGHT LOSS   Celia  BMI is Body mass index is 33.63 kg/m². kg/m2. Patient's BMI is decreasing. Patient's main causes of weight gain are poor eating habits and inactivity. Patient have been trying to lose weight on her own without any success. So far, she  tried own diet. Patient's current status lost 9 lbs over 4 weeks. 3- Strong desire present every day due to reducing carbohydrate consumption, increasing protein, eliminating pop, reducing fast food consumption, increasing exercise, and reducing sugar. Discussed low carbohydrate diet, increase physical activity, and medication prescribed: ADIPEX. #2    CHANGES  Have not been eating late  Drinking more water  Walking 8- 11 thousand steps daily- 4- 5 times a week. Home gym and spin bike- not feasible since patient admits to not being able to get herself to start working out on her own. However, she will start circuit/HIT training at [facility] [employer]    Despite working 7p-7a. She does have a schedule worked up where she will do some afternoon classes before her work. We will also work out some time that she can do some work out after work before going to bed. Patient seem to have a good hold on when she can do some of her cyclic training around her work schedule and life at home. Cleia  was started on Adipex. Patient is on Adipex Dose #2. Celia  admits to appropriate appetite suppression.    Patient denies any nausea, vomiting, abdominal Exercise Minutes/Week 90     Exercise Tracking - Detailed 9/30/2021   Walking Duration 30   Walking Intensity Moderate   Walking Times/Week 3   Biking Duration -   Biking Times/Week -   Running Duration -   Running Times/Week -   Swimming Duration -   Swimming Times/Week -   Cardiovascular Equipment Duration -   Cardiovascular Equipment Times/Week -   Exercise Classes / Videos Duration -   Exercise Classes / Videos Intensity -   Exercise Classes / Videos Times/Week -   Strength Training Duration -   Strength Training Times/Week -   Other Exercise Comment -   Other Duration -   Other Intensity -   Other Times/Week -   Pedometer Steps/Day 5000   Total Exercise Minutes/Week 90      Plan   Celia BHATTI Danielito Keys reports no use of illegal drug substances, and alcohol use. Denies pregnancy. Controlled Substance Monitoring:  Acute and Chronic Pain Monitoring:   RX Monitoring 10/25/2021   Attestation -   Periodic Controlled Substance Monitoring No signs of potential drug abuse or diversion identified. Chronic Pain > 50 MEDD -          No data recorded  I communicated with the patient and/or health care decision maker about: PLAN     Review of Systems   Constitutional: Positive for unexpected weight change (losing). Negative for chills and fever. HENT: Negative. Respiratory: Negative. Negative for shortness of breath and wheezing. Cardiovascular: Negative. Negative for chest pain and palpitations. Gastrointestinal: Negative for abdominal pain. Endocrine: Negative. Musculoskeletal: Negative for arthralgias. Neurological: Negative for headaches. Psychiatric/Behavioral: Negative for sleep disturbance and suicidal ideas. The patient is nervous/anxious (less). Details of this discussion including any medical advice provided: Under assessment.     PHYSICAL EXAM[INSTRUCTIONS:  \"[x]\" Indicates a positive item  \"[]\" Indicates a negative item  -- DELETE ALL ITEMS NOT EXAMINED]  Patient-Reported Vitals 10/28/2021 Patient-Reported Weight 178 lbs   Patient-Reported Height -   Patient-Reported Systolic 542   Patient-Reported Diastolic 75   Patient-Reported Pulse -   Patient-Reported Temperature -   Patient-Reported SpO2 -   Patient-Reported Peak Flow -     Constitutional: [x] No apparent distress      [] Abnormal -   Mental status: [x] Alert and awake  [x] Oriented to person/place/time[] Abnormal -   Pulmonary/Chest: [x] Respiratory effort normal         [] Abnormal -          Psychiatric:       [x] Normal Affect [] Abnormal -        [x] No Hallucinations  Other pertinent observable physical exam findings:-less anxious, elated mood  Controlled Substance Monitoring:  Acute and Chronic Pain Monitoring:   RX Monitoring 10/25/2021   Attestation -   Periodic Controlled Substance Monitoring No signs of potential drug abuse or diversion identified. Chronic Pain > 50 MEDD -     ASSESSMENT  1. Encounter for weight loss counseling  Improving  Continue adipex dose #2    - phentermine (ADIPEX-P) 37.5 MG tablet; Take 1 tablet by mouth every morning (before breakfast) for 30 days. Dispense: 30 tablet; Refill: 0    2. Severe obesity (BMI 35.0-35.9 with comorbidity) ([address]  Improving  BMI decreasing  DISCUSSED AND ADVISED TO:  Eat a low-fat and low carbohydrates diet. Avoid fried foods especially fast food. Choose healthier options for snacks. Have 5-6 servings of fruits and vegetables per day. Cut down on eating processed food. Add 30 minutes to 1 hour aerobic exercise for 3-4 days a week. - phentermine (ADIPEX-P) 37.5 MG tablet; Take 1 tablet by mouth every morning (before breakfast) for 30 days. Dispense: 30 tablet; Refill: 0    3. Anxiety about health  Improving  Continue current therapy. Discussed how to recognize anxiety. Advised to relieve tension with exercise or a massage. Advised to get enough rest.  Advised to avoid alcohol, caffeine, nicotine, and illegal drugs.    Which can increase anxiety level and cause sleep problems. Total Time: minutes: 11-20 minutes  I affirm this is a Patient Initiated Episode with a Patient who has not had a related appointment within my department in the past 7 days or scheduled within the next 24 hours. Patient identification was verified at the start of the visit: Yes  Note: not billable if this call serves to triage the patient into an appointment for the relevant concern  Patient-Reported Vitals 10/28/2021   Patient-Reported Weight 178 lbs   Patient-Reported Height -   Patient-Reported Systolic 929   Patient-Reported Diastolic 75   Patient-Reported Pulse -   Patient-Reported Temperature -   Patient-Reported SpO2 -   Patient-Reported Peak Flow -     Patient Active Problem List   Diagnosis    History of tubal ligation    Vision abnormalities    Herpes simplex type 1 infection    History of endometrial ablation    Hyperlipidemia with target LDL less than 100    Hyperglycemia    Prediabetes    Severe obesity (BMI 35.0-35.9 with comorbidity) (HCC)    History of kidney stones    Snoring    Asthma, exercise induced    History of colon polyps    Slow transit constipation    Anxiety    Heart palpitations    Chronic fatigue    Hot flashes    Major depressive disorder with single episode, in full remission (HCC)    Abnormal EKG    Family history of premature CAD    History of uterine fibroid    Enlarged tonsils    Status post appendectomy    Vitamin B 12 deficiency     Celia Lujan Brain is a 51 y.o. female patient  being evaluated by a Virtual Visit (video visit) encounter to address concerns as mentioned above. A caregiver was present when appropriate. Due to this being a TeleHealth encounter (During [facility] emergency), evaluation of the following organ systems was limited:Vitals/Constitutional/EENT/Resp/CV/GI//MS/Neuro/Skin/Heme-Lymph-Imm. Services were provided through a video synchronous discussion virtually to substitute for in-person clinic visit.  This is a telehealth visit that was performed with the originating site at Patient Location: home and provider Location of [place], [place]. Pursuant to the emergency declaration under the [address] [place] [address] authority and the [employer] Act, this Virtual Visit was conducted with patient's (and/or legal guardian's) consent, to reduce the patient's risk of exposure to COVID-19 and provide necessary medical care. The patient (and/or legal guardian) has also been advised to contact this office for worsening conditions or problems, and seek emergency medical treatment and/or call 911 if deemed necessary. This note was completed by using the assistance of a speech-recognition program. However, inadvertent computerized transcription errors may be present. Although every effort was made to ensure accuracy, no guarantees can be provided that every mistake has been identified and corrected by editing.   Electronically signed by MATT Wilkins CNP on 10/25/21 at 9:00 PM EDT

## 2021-10-28 ENCOUNTER — TELEMEDICINE (OUTPATIENT)
Dept: FAMILY MEDICINE CLINIC | Age: 46
End: 2021-10-28
Payer: COMMERCIAL

## 2021-10-28 VITALS — WEIGHT: 178 LBS | BODY MASS INDEX: 33.61 KG/M2 | HEIGHT: 61 IN

## 2021-10-28 DIAGNOSIS — E66.01 SEVERE OBESITY (BMI 35.0-35.9 WITH COMORBIDITY) (HCC): ICD-10-CM

## 2021-10-28 DIAGNOSIS — Z71.3 ENCOUNTER FOR WEIGHT LOSS COUNSELING: Primary | ICD-10-CM

## 2021-10-28 DIAGNOSIS — F41.8 ANXIETY ABOUT HEALTH: ICD-10-CM

## 2021-10-28 PROCEDURE — 99213 OFFICE O/P EST LOW 20 MIN: CPT | Performed by: FAMILY MEDICINE

## 2021-10-28 RX ORDER — PHENTERMINE HYDROCHLORIDE 37.5 MG/1
37.5 TABLET ORAL
Qty: 30 TABLET | Refills: 0 | Status: SHIPPED | OUTPATIENT
Start: 2021-10-28 | End: 2021-11-23 | Stop reason: SDUPTHER

## 2021-10-28 ASSESSMENT — ENCOUNTER SYMPTOMS
RESPIRATORY NEGATIVE: 1
WHEEZING: 0
ABDOMINAL PAIN: 0
SHORTNESS OF BREATH: 0

## 2021-10-28 NOTE — PATIENT INSTRUCTIONS
Patient Education        Eating Healthy Foods: Care Instructions  Your Care Instructions     Eating healthy foods can help lower your risk for disease. Healthy food gives you energy and keeps your heart strong, your brain active, your muscles working, and your bones strong. A healthy diet includes a variety of foods from the basic food groups: grains, vegetables, fruits, milk and milk products, and meat and beans. Some people may eat more of their favorite foods from only one food group and, as a result, miss getting the nutrients they need. So, it is important to pay attention not only to what you eat but also to what you are missing from your diet. You can eat a healthy, balanced diet by making a few small changes. Follow-up care is a key part of your treatment and safety. Be sure to make and go to all appointments, and call your doctor if you are having problems. It's also a good idea to know your test results and keep a list of the medicines you take. How can you care for yourself at home? Look at what you eat  · Keep a food diary for a week or two and record everything you eat or drink. Track the number of servings you eat from each food group. · For a balanced diet every day, eat a variety of:  ? 6 or more ounce-equivalents of grains, such as cereals, breads, crackers, rice, or pasta, every day. An ounce-equivalent is 1 slice of bread, 1 cup of ready-to-eat cereal, or ½ cup of cooked rice, cooked pasta, or cooked cereal.  ? 2½ cups of vegetables, especially:  § Dark-green vegetables such as broccoli and spinach. § Orange vegetables such as carrots and sweet potatoes. § Dry beans (such as weeks and kidney beans) and peas (such as lentils). ? 2 cups of fresh, frozen, or canned fruit. A small apple or 1 banana or orange equals 1 cup. ? 3 cups of nonfat or low-fat milk, yogurt, or other milk products. ? 5½ ounces of meat and beans, such as chicken, fish, lean meat, beans, nuts, and seeds.  One egg, 1 tablespoon of peanut butter, ½ ounce nuts or seeds, or ¼ cup of cooked beans equals 1 ounce of meat. · Learn how to read food labels for serving sizes and ingredients. Fast-food and convenience-food meals often contain few or no fruits or vegetables. Make sure you eat some fruits and vegetables to make the meal more nutritious. · Look at your food diary. For each food group, add up what you have eaten and then divide the total by the number of days. This will give you an idea of how much you are eating from each food group. See if you can find some ways to change your diet to make it more healthy. Start small  · Do not try to make dramatic changes to your diet all at once. You might feel that you are missing out on your favorite foods and then be more likely to fail. · Start slowly, and gradually change your habits. Try some of the following:  ? Use whole wheat bread instead of white bread. ? Use nonfat or low-fat milk instead of whole milk. ? Eat brown rice instead of white rice, and eat whole wheat pasta instead of white-flour pasta. ? Try low-fat cheeses and low-fat yogurt. ? Add more fruits and vegetables to meals and have them for snacks. ? Add lettuce, tomato, cucumber, and onion to sandwiches. ? Add fruit to yogurt and cereal.  Enjoy food  · You can still eat your favorite foods. You just may need to eat less of them. If your favorite foods are high in fat, salt, and sugar, limit how often you eat them, but do not cut them out entirely. · Eat a wide variety of foods. Make healthy choices when eating out  · The type of restaurant you choose can help you make healthy choices. Even fast-food chains are now offering more low-fat or healthier choices on the menu. · Choose smaller portions, or take half of your meal home. · When eating out, try:  ? A veggie pizza with a whole wheat crust or grilled chicken (instead of sausage or pepperoni).   ? Pasta with roasted vegetables, grilled chicken, or marinara sauce instead of cream sauce. ? A vegetable wrap or grilled chicken wrap. ? Broiled or poached food instead of fried or breaded items. Make healthy choices easy  · Buy packaged, prewashed, ready-to-eat fresh vegetables and fruits, such as baby carrots, salad mixes, and chopped or shredded broccoli and cauliflower. · Buy packaged, presliced fruits, such as melon or pineapple. · Choose 100% fruit or vegetable juice instead of soda. Limit juice intake to 4 to 6 oz (½ to ¾ cup) a day. · Blend low-fat yogurt, fruit juice, and canned or frozen fruit to make a smoothie for breakfast or a snack. Where can you learn more? Go to https://[employer] Care[employer] HoldingspeLemko.ChorPpay. org and sign in to your [employer] account. Enter Y174 in the [employer] box to learn more about \"Eating Healthy Foods: Care Instructions. \"     If you do not have an account, please click on the \"Sign Up Now\" link. Current as of: December 17, 2020               Content Version: 13.0  © 7090-3229 [employer]. Care instructions adapted under license by [facility] ([facility]). If you have questions about a medical condition or this instruction, always ask your healthcare professional. [address] any warranty or liability for your use of this information. Patient Education        Learning About Being Physically Active  What is physical activity? Being physically active means doing any kind of activity that gets your body moving. The types of physical activity that can help you get fit and stay healthy include:  · Aerobic or \"cardio\" activities. These make your heart beat faster and make you breathe harder, such as brisk walking, riding a bike, or running. They strengthen your heart and lungs and build up your endurance. · Strength training activities. These make your muscles work against, or \"resist,\" something. Examples include lifting weights or doing push-ups.  These activities help tone and strengthen your muscles and bones. · Stretches. These let you move your joints and muscles through their full range of motion. Stretching helps you be more flexible. What are the benefits of being active? Being active is one of the best things you can do for your health. It helps you to:  · Feel stronger and have more energy to do all the things you like to do. · Focus better at school or work. · Feel, think, and sleep better. · Reach and stay at a healthy weight. · Lose fat and build lean muscle. · Lower your risk for serious health problems, including diabetes, heart attack, high blood pressure, and some cancers. · Keep your heart, lungs, bones, muscles, and joints strong and healthy. How can you make being active part of your life? Start slowly. Make it your long-term goal to get at least 30 minutes of exercise on most days of the week. Walking is a good choice. You also may want to do other activities, such as running, swimming, cycling, or playing tennis or team sports. Pick activities that you like--ones that make your heart beat faster, your muscles stronger, and your muscles and joints more flexible. If you find more than one thing you like doing, do them all. You don't have to do the same thing every day. Get your heart pumping every day. Any activity that makes your heart beat faster and keeps it at that rate for a while counts. Here are some great ways to get your heart beating faster:  · Go for a brisk walk, run, or bike ride. · Go for a hike or swim. · Go in-line skating. · Play a game of touch football, basketball, or soccer. · Ride a bike. · Play tennis or racquetball. · Climb stairs. Even some household chores can be aerobic--just do them at a faster pace. Vacuuming, raking or mowing the lawn, sweeping the garage, and washing and waxing the car all can help get your heart rate up. Strengthen your muscles during the week.  You don't have to lift heavy weights or grow big, bulky muscles to get stronger. Doing a few simple activities that make your muscles work against, or \"resist,\" something can help you get stronger. For example, you can:  · Do push-ups or sit-ups, which use your own body weight as resistance. · Lift weights or dumbbells or use stretch bands at home or in a gym or community center. Stretch your muscles often. Stretching will help you as you become more active. It can help you stay flexible, loosen tight muscles, and avoid injury. It can also help improve your balance and posture and can be a great way to relax. Be sure to stretch the muscles you'll be using when you work out. It's best to warm your muscles slightly before you stretch them. Walk or do some other light aerobic activity for a few minutes, and then start stretching. When you stretch your muscles:  · Do it slowly. Stretching is not about going fast or making sudden movements. · Don't push or bounce during a stretch. · Hold each stretch for at least 15 to 30 seconds, if you can. You should feel a stretch in the muscle, but not pain. · Breathe out as you do the stretch. Then breathe in as you hold the stretch. Don't hold your breath. If you're worried about how more activity might affect your health, have a checkup before you start. Follow any special advice your doctor gives you for getting a smart start. Where can you learn more? Go to https://chpegee.Viewglass. org and sign in to your [employer] account. Enter Y613 in the [employer] box to learn more about \"Learning About Being Physically Active. \"     If you do not have an account, please click on the \"Sign Up Now\" link. Current as of: May 12, 2021               Content Version: 13.0  © 6790-8159 Healthwise[employer]. Care instructions adapted under license by [facility] ([facility]).  If you have questions about a medical condition or this instruction, always ask your healthcare professional. Maria Fernanda Rainey any

## 2021-11-23 ENCOUNTER — TELEMEDICINE (OUTPATIENT)
Dept: FAMILY MEDICINE CLINIC | Age: 46
End: 2021-11-23
Payer: COMMERCIAL

## 2021-11-23 DIAGNOSIS — E66.01 SEVERE OBESITY (BMI 35.0-35.9 WITH COMORBIDITY) (HCC): Primary | ICD-10-CM

## 2021-11-23 DIAGNOSIS — R73.03 PREDIABETES: ICD-10-CM

## 2021-11-23 DIAGNOSIS — E78.5 HYPERLIPIDEMIA WITH TARGET LDL LESS THAN 100: ICD-10-CM

## 2021-11-23 DIAGNOSIS — J45.990 ASTHMA, EXERCISE INDUCED: ICD-10-CM

## 2021-11-23 PROCEDURE — 99214 OFFICE O/P EST MOD 30 MIN: CPT | Performed by: FAMILY MEDICINE

## 2021-11-23 RX ORDER — FLUTICASONE PROPIONATE 44 UG/1
2 AEROSOL, METERED RESPIRATORY (INHALATION) 2 TIMES DAILY
Qty: 10.6 G | Refills: 3 | Status: SHIPPED | OUTPATIENT
Start: 2021-11-23 | End: 2022-10-14 | Stop reason: SDUPTHER

## 2021-11-23 RX ORDER — PHENTERMINE HYDROCHLORIDE 37.5 MG/1
37.5 TABLET ORAL
Qty: 30 TABLET | Refills: 0 | Status: SHIPPED | OUTPATIENT
Start: 2021-11-23 | End: 2021-12-23

## 2021-11-23 ASSESSMENT — ENCOUNTER SYMPTOMS
ABDOMINAL PAIN: 0
CHEST TIGHTNESS: 0
VOMITING: 0
SHORTNESS OF BREATH: 1
COUGH: 0
DIARRHEA: 0
ABDOMINAL DISTENTION: 0
CONSTIPATION: 0
WHEEZING: 1
NAUSEA: 0

## 2021-11-23 NOTE — PROGRESS NOTES
2021    TELEHEALTH EVALUATION -- Audio/Visual (During QNHSY-52 public health emergency)      Ara Phillips (:  1975) is a 51 y.o. female,Established patient, here for evaluation of the following chief complaint(s): Discuss Medications (ADIPEX #3), Obesity, Asthma, Hyperlipidemia, and Hyperglycemia        ASSESSMENT/PLAN:    1. Severe obesity (BMI 35.0-35.9 with comorbidity) ([facility])  Improving  -     phentermine (ADIPEX-P) 37.5 MG tablet; Take 1 tablet by mouth every morning (before breakfast) for 30 days. , Disp-30 tablet, R-0Normal  -     Comprehensive Metabolic Panel; Future  -     Lipid Panel; Future  Low carb, low fat diet, increase fruits and vegetables, and exercise 4-5 times a week 30-40 minutes a day, or walk 1-2 hours per day, or wear a pedometer and get at least 10,000 steps per day. 2. Asthma, exercise induced  Inadequately controlled  Continue Singulair at night, albuterol as needed, start ICS  - start    fluticasone (FLOVENT HFA) 44 MCG/ACT inhaler; Inhale 2 puffs into the lungs 2 times daily, Disp-10.6 g, R-3Normal  3. Prediabetes  Improving  She has been off Metformin, Victoza, Ozempic, Steglatro  Continue lifestyle changes, low-carb, low-fat diet and weight loss  Recheck Hgb A1c  Lab Results   Component Value Date    LABA1C 5.6 2021    LABA1C 5.9 2020    LABA1C 6.0 2020       -     Vitamin B12 & Folate; Future  -     Hemoglobin A1C; Future  4. Hyperlipidemia with target LDL less than 100  Inadequately controlled  She self-discontinued Pravachol  Lab Results   Component Value Date    LDLCHOLESTEROL 122 2021       -     Lipid Panel; Future      Celia received counseling on the following healthy behaviors: nutrition, exercise, medication adherence and weight loss  Reviewed prior labs and health maintenance  Discussed use, benefit, and side effects of prescribed medications. Barriers to medication compliance addressed.    Patient given educational materials - see patient instructions  All patient questions answered. Patient voiced understanding. The patient's past medical,surgical, social, and family history as well as her current medications and allergies were reviewed as documented in today's encounter. Medications, labs, diagnostic studies, consultations and follow-up as documented in this encounter. Return in about 4 months (around 3/23/2022) for ALWAYS NEEDS 30 MIN. APPT, PHQ-9 in EPIC, ASTHMA, LABS F/U. Data Unavailable    Future Appointments   Date Time Provider Diamond Gonzalez   12/3/2021  9:30 AM STA MAMMO RM 1 [facility] MAMMO STA Radiolog   12/3/2021 10:30 AM STA ULTRASOUND RM 3 STAZ US STA Radiolog   20[facility]         SUBJECTIVE/OBJECTIVE:  Adrian Garvey (:  1975) has requested an audio/video evaluation for the following concern(s):Discuss Medications (ADIPEX #3), Obesity, Asthma, Hyperlipidemia, and Hyperglycemia    Wants to lose weight. Adrian Garvey was started on Adipex. Patient is here for refill of Adipex # 3    In addition to the medication, patient also using diet and exercise as follows:  -diet: Low-carb diet, drinking only water  -exercise: Cardiovascular 50 minutes at a time starting this week will do 4 days a week    Medication side effects: None. Patient denies : Chest pain, nausea, vomiting, abdominal pain, palpitations, insomnia or tremors      Contraceptive method: Tubal ligation    Patient informed that when prescribed a controlled substance for weight loss, the provider is required by law to see the patient for an appointment every thirty days. This is neccessary to record the weight and blood pressure and to assess patient's efforts to lose weight, and to ensure there are no contraindications or adverse effects.    Patient advised contraception during the time of taking this medication, advised no alcohol use during this time      blood pressure is Normal.  Recheck of the blood pressure was 134/81  She says  when exercise and the blood pressure goes up, but if she relaxes, the blood pressure is normal  BP Readings from Last 3 Encounters:   10/07/21 114/72   08/05/21 116/78   07/23/21 120/84          Weight has been decreasing, she reports her weight is 172 pounds. Patient intentionally lost 6 pounds in 1 month, praise given    Wt Readings from Last 3 Encounters:   10/28/21 178 lb (80.7 kg)   10/07/21 187 lb (84.8 kg)   08/05/21 187 lb (84.8 kg)     She says she does circuit training , does 50 mins of working out    She reports dyspnea on exertion and wheezing when working out       Exercise Tracking - Detailed 11/23/2021   Walking Duration -   Walking Intensity -   Walking Times/Week -   Biking Duration -   Biking Times/Week -   Running Duration -   Running Times/Week -   Swimming Duration -   Swimming Times/Week -   Cardiovascular Equipment Duration 50   Cardiovascular Equipment Intensity High   Cardiovascular Equipment Times/Week 4   Exercise Classes / Videos Duration -   Exercise Classes / Videos Intensity -   Exercise Classes / Videos Times/Week -   Strength Training Duration -   Strength Training Times/Week -   Other Exercise Comment -   Other Duration -   Other Intensity -   Other Times/Week -   Pedometer Steps/Day 15385   Total Exercise Minutes/Week 200       Asthma:  Current treatment includes beta agonist inhalers, leukotriene antagonists, which has been somewhat effective. Using preventive medication(s) consistently: yes. Residual symptoms: dyspnea and wheezing. Patient denies cough. She requires her rescue inhaler 1 time(s) per day otherwise gets short of breath and wheezy when exercising  She does not smoke    Hyperlipidemia:  Patient is  following a low fat, low cholesterol diet. She is  exercising regularly.  OTC Supplements: none  self-discontinued Pravachol    LDL is  High and high triglycerides    Lab Results   Component Value Date    LDLCHOLESTEROL 122 07/23/2021     Lab Results   Component Value Date    TRIG 262 (H) 02/14/2020    TRIG 156 (H) 11/03/2018    TRIG 246 (H) 05/22/2017     Prediabetes improved  Has been eating low-carb diet. Denies increased appetite, thirst or polyuria. She is not taking any medications for prediabetes anymore. Lab Results   Component Value Date    LABA1C 5.6 07/23/2021    LABA1C 5.9 11/03/2020    LABA1C 6.0 02/14/2020           Review of Systems   Constitutional: Negative for activity change, appetite change, chills, diaphoresis, fatigue, fever and unexpected weight change. Respiratory: Positive for shortness of breath and wheezing. Negative for cough and chest tightness. Cardiovascular: Negative for chest pain, palpitations and leg swelling. Gastrointestinal: Negative for abdominal distention, abdominal pain, constipation, diarrhea, nausea and vomiting. Endocrine: Negative for cold intolerance, heat intolerance, polydipsia, polyphagia and polyuria. Allergic/Immunologic: Positive for environmental allergies. Neurological: Negative for tremors and headaches. Prior to Visit Medications    Medication Sig Taking? Authorizing Provider   phentermine (ADIPEX-P) 37.5 MG tablet Take 1 tablet by mouth every morning (before breakfast) for 30 days.  Yes Anais Robb, APRN - CNP   albuterol sulfate HFA (PROAIR HFA) 108 (90 Base) MCG/ACT inhaler Inhale 2 puffs into the lungs every 6 hours as needed for Wheezing or Shortness of Breath (cough) Yes Lena Barton MD   montelukast (SINGULAIR) 4 MG chewable tablet Take 1 tablet by mouth nightly Yes Gilmar Walters MD   cyanocobalamin ([employer] VITAMIN B12) 1000 MCG tablet Take 1 tablet by mouth daily Yes Gilmar Walters MD   fluticasone (FLONASE) 50 MCG/ACT nasal spray 2 sprays by Nasal route nightly Yes Gilmar Walters MD   Magnesium 400 MG CAPS Take by mouth Yes Historical Provider, MD       Social History     Tobacco Use    Smoking status: Never Smoker  Smokeless tobacco: Never Used   Vaping Use    Vaping Use: Never used   Substance Use Topics    Alcohol use: Yes     Alcohol/week: 0.0 standard drinks     Comment: Socially    Drug use: No          PHYSICAL EXAMINATION:    Vital Signs: (As obtained by patient/caregiver or practitioner observation)  -vital signs stable and within normal limits except Obesity per BMI, 32.5 kg/m2    Patient-Reported Vitals 11/23/2021   Patient-Reported Weight 172 lbs   Patient-Reported Height 5 ft 1 in   Patient-Reported Systolic 657   Patient-Reported Diastolic 81   Patient-Reported Pulse -   Patient-Reported Temperature -   Patient-Reported SpO2 -   Patient-Reported Peak Flow -         172 lbs today  Wt Readings from Last 3 Encounters:   10/28/21 178 lb (80.7 kg)   10/07/21 187 lb (84.8 kg)   08/05/21 187 lb (84.8 kg)         Intensity of pain is 0/10    Constitutional: [x] Appears well-developed and well-nourished [x] No apparent distress      [x] Abnormal-obese    Mental status  [x] Alert and awake  [x] Oriented to person/place/time [x]Able to follow commands      Eyes:  EOM    [x]  Normal  [] Abnormal-  Sclera  [x]  Normal  [] Abnormal -         Discharge [x]  None visible  [] Abnormal -    HENT:   [x] Normocephalic, atraumatic.   [] Abnormal   [x] Mouth/Throat: Mucous membranes are moist.     External Ears [x] Normal  [] Abnormal-     Neck: [x] No visualized mass     Pulmonary/Chest: [x] Respiratory effort normal.  [x] No visualized signs of difficulty breathing or respiratory distress        [] Abnormal      Musculoskeletal:   [x] Normal gait with no signs of ataxia         [x] Normal range of motion of neck        [] Abnormal-       Neurological:        [x] No Facial Asymmetry (Cranial nerve 7 motor function) (limited exam to video visit)          [x] No gaze palsy        [] Abnormal-            Skin:        [x] No significant exanthematous lesions or discoloration noted on facial skin         [] Abnormal- Psychiatric:      [x] No Hallucinations       [x]Mood is normal      [x]Behavior is normal      [x]Judgment is normal      [x]Thought content is normal       [] Abnormal-     Other pertinent observable physical exam findings-none    Due to this being a TeleHealth encounter, evaluation of the following organ systems is limited: Vitals/Constitutional/EENT/Resp/CV/GI//MS/Neuro/Skin/Heme-Lymph-Imm. I personally reviewed most recent labs reviewed with the patient and all questions fully answered.    Hyperlipidemia  Prediabetes improved  Vitamin B12 deficiency  Otherwise labs within normal limits        Lab Results   Component Value Date    WBC 6.0 07/23/2021    HGB 12.8 07/23/2021    HCT 41.4 07/23/2021    MCV 95.6 07/23/2021     07/23/2021       Lab Results   Component Value Date     07/23/2021    K 4.2 07/23/2021    CL 99 07/23/2021    CO2 22 07/23/2021    BUN 9 07/23/2021    CREATININE 0.72 07/23/2021    GLUCOSE 80 07/23/2021    GLUCOSE 90 12/16/2011    CALCIUM 9.5 07/23/2021        Lab Results   Component Value Date    ALT 13 07/23/2021    AST 11 07/23/2021    ALKPHOS 56 07/23/2021    BILITOT 0.31 07/23/2021       Lab Results   Component Value Date    TSH 0.94 07/23/2021       Lab Results   Component Value Date    CHOL 227 (H) 02/14/2020    CHOL 194 11/03/2018    CHOL 226 (H) 05/22/2017     Lab Results   Component Value Date    TRIG 262 (H) 02/14/2020    TRIG 156 (H) 11/03/2018    TRIG 246 (H) 05/22/2017     Lab Results   Component Value Date    HDL 47 07/23/2021    HDL 42 02/14/2020    HDL 42 11/03/2018     Lab Results   Component Value Date    LDLCHOLESTEROL 122 07/23/2021    LDLCHOLESTEROL 133 (H) 02/14/2020    LDLCHOLESTEROL 121 11/03/2018       Lab Results   Component Value Date    CHOLHDLRATIO 4.5 07/23/2021    CHOLHDLRATIO 5.4 (H) 02/14/2020    CHOLHDLRATIO 4.6 11/03/2018       Lab Results   Component Value Date    LABA1C 5.6 07/23/2021    LABA1C 5.9 11/03/2020    LABA1C 6.0 02/14/2020 Lab Results   Component Value Date    INPYNINS40 276 07/23/2021       Lab Results   Component Value Date    VITD25 34.0 04/06/2013       Orders Placed This Encounter   Medications    phentermine (ADIPEX-P) 37.5 MG tablet     Sig: Take 1 tablet by mouth every morning (before breakfast) for 30 days. Dispense:  30 tablet     Refill:  0    fluticasone (FLOVENT HFA) 44 MCG/ACT inhaler     Sig: Inhale 2 puffs into the lungs 2 times daily     Dispense:  10.6 g     Refill:  3       Orders Placed This Encounter   Procedures    Comprehensive Metabolic Panel     Standing Status:   Future     Standing Expiration Date:   11/23/2022    Lipid Panel     Standing Status:   Future     Standing Expiration Date:   11/23/2022     Order Specific Question:   Is Patient Fasting?/# of Hours     Answer:   8-10 Hours, water ok to drink    Vitamin B12 & Folate     Standing Status:   Future     Standing Expiration Date:   11/23/2022    Hemoglobin A1C     Standing Status:   Future     Standing Expiration Date:   11/23/2022       Medications Discontinued During This Encounter   Medication Reason    phentermine (ADIPEX-P) 37.5 MG tablet REORDER              [employer], was evaluated through a synchronous (real-time) audio-video encounter. The patient (or guardian if applicable) is aware that this is a billable service. Verbal consent to proceed has been obtained within the past 12 months. The visit was conducted pursuant to the emergency declaration under the [facility], [address] [place] [address] authority and the [employer] and [employer] General Act. Patient identification was verified    Patient was alone   The patient was located in a state where the provider was credentialed to provide care.     On this date 11/23/2021 I have spent 35 minutes reviewing previous notes, test results and face to face with the patient discussing the diagnosis and importance of compliance with the treatment plan as well as documenting on the day of the visit. --Vikas Black MD on 11/28/2021 at 10:01 PM    An electronic signature was used to authenticate this note.

## 2021-11-23 NOTE — PROGRESS NOTES
Visit Information    Have you changed or started any medications since your last visit including any over-the-counter medicines, vitamins, or herbal medicines? no   Have you stopped taking any of your medications? Is so, why? -  no  Are you having any side effects from any of your medications? - no    Have you seen any other physician or provider since your last visit?  no   Have you had any other diagnostic tests since your last visit?  no   Have you been seen in the emergency room and/or had an admission in a hospital since we last saw you?  no   Have you had your routine dental cleaning in the past 6 months?  yes -      Do you have an active MyChart account? If no, what is the barrier?   Yes    Patient Care Team:  Beto Velasco MD as PCP - General (Family Medicine)  Beto Velasco MD as PCP - [facility]  Rometta Runner, APRN - CNP as Nurse Practitioner (Certified Nurse Practitioner)  Avelino Diaz MD as Consulting Physician (Urology)  MATT Hernandez CNP (Cardiology)  Bravo Hammer MD as Consulting Physician (Cardiology)  Shellye Osler, DO as Consulting Physician (Obstetrics & Gynecology)    Medical History Review  Past Medical, Family, and Social History reviewed and does contribute to the patient presenting condition    Health Maintenance   Topic Date Due    DTaP/Tdap/Td vaccine (1 - Tdap) 07/03/2022 (Originally 2/15/2017)    A1C test (Diabetic or Prediabetic)  07/23/2022    Cervical cancer screen  07/10/2025    Lipid screen  07/23/2026    Colon cancer screen colonoscopy  06/28/2029    Pneumococcal 0-64 years Vaccine (2 of 2 - PPSV23) 02/22/2039    Flu vaccine  Completed    COVID-19 Vaccine  Completed    Hepatitis C screen  Completed    HIV screen  Completed    Hepatitis A vaccine  Aged Out    Hepatitis B vaccine  Aged Out    Hib vaccine  Aged Out    Meningococcal (ACWY) vaccine  Aged Out

## 2021-12-28 ENCOUNTER — OFFICE VISIT (OUTPATIENT)
Dept: OBGYN CLINIC | Age: 46
End: 2021-12-28
Payer: COMMERCIAL

## 2021-12-28 VITALS
BODY MASS INDEX: 34.55 KG/M2 | WEIGHT: 183 LBS | DIASTOLIC BLOOD PRESSURE: 88 MMHG | SYSTOLIC BLOOD PRESSURE: 134 MMHG | HEIGHT: 61 IN

## 2021-12-28 DIAGNOSIS — Z11.3 SCREENING FOR STDS (SEXUALLY TRANSMITTED DISEASES): ICD-10-CM

## 2021-12-28 DIAGNOSIS — N76.0 ACUTE VAGINITIS: Primary | ICD-10-CM

## 2021-12-28 PROCEDURE — 99213 OFFICE O/P EST LOW 20 MIN: CPT | Performed by: NURSE PRACTITIONER

## 2021-12-28 NOTE — PROGRESS NOTES
Heart Attack Father 35    Bipolar Disorder Sister     Lung Cancer Paternal Grandmother     Heart Attack Paternal Grandfather     Colon Cancer Neg Hx     Breast Cancer Neg Hx        Social History     Socioeconomic History    Marital status: Single     Spouse name: Not on file    Number of children: Not on file    Years of education: Not on file    Highest education level: Not on file   Occupational History    Not on file   Tobacco Use    Smoking status: Never Smoker    Smokeless tobacco: Never Used   Vaping Use    Vaping Use: Never used   Substance and Sexual Activity    Alcohol use: Yes     Alcohol/week: 0.0 standard drinks     Comment: Socially    Drug use: No    Sexual activity: Yes     Partners: Male     Birth control/protection: Surgical     Comment: TL   Other Topics Concern    Not on file   Social History Narrative    Not on file     Social Determinants of Health     Financial Resource Strain: Medium Risk    Difficulty of Paying Living Expenses: Somewhat hard   Food Insecurity: No Food Insecurity    Worried About Running Out of Food in the Last Year: Never true    Stu of Food in the Last Year: Never true   Transportation Needs:     Lack of Transportation (Medical): Not on file    Lack of Transportation (Non-Medical):  Not on file   Physical Activity:     Days of Exercise per Week: Not on file    Minutes of Exercise per Session: Not on file   Stress:     Feeling of Stress : Not on file   Social Connections:     Frequency of Communication with Friends and Family: Not on file    Frequency of Social Gatherings with Friends and Family: Not on file    Attends Alevism Services: Not on file    Active Member of Clubs or Organizations: Not on file    Attends Club or Organization Meetings: Not on file    Marital Status: Not on file   Intimate Partner Violence:     Fear of Current or Ex-Partner: Not on file    Emotionally Abused: Not on file    Physically Abused: Not on file   [address] [place] [address] Sexually Abused: Not on file   Housing Stability:     Unable to Pay for Housing in the Last Year: Not on file    Number of Places Lived in the Last Year: Not on file    Unstable Housing in the Last Year: Not on file         MEDICATIONS:  Current Outpatient Medications   Medication Sig Dispense Refill    fluticasone (FLOVENT HFA) 44 MCG/ACT inhaler Inhale 2 puffs into the lungs 2 times daily 10.6 g 3    albuterol sulfate HFA (PROAIR HFA) 108 (90 Base) MCG/ACT inhaler Inhale 2 puffs into the lungs every 6 hours as needed for Wheezing or Shortness of Breath (cough) 8 g 3    montelukast (SINGULAIR) 4 MG chewable tablet Take 1 tablet by mouth nightly 30 tablet 3    cyanocobalamin ([employer] VITAMIN B12) 1000 MCG tablet Take 1 tablet by mouth daily 30 tablet 3    fluticasone (FLONASE) 50 MCG/ACT nasal spray 2 sprays by Nasal route nightly 1 Bottle 3    Magnesium 400 MG CAPS Take by mouth       No current facility-administered medications for this visit. ALLERGIES:  Allergies as of 12/28/2021    (No Known Allergies)         REVIEW OF SYSTEMS:    yes   A minimum of an eleven point review of systems was completed. Review Of Systems (11 point):  Constitutional: No fever, chills or malaise; No weight change or fatigue  Head and Eyes: No vision, Headache, Dizziness or trauma in last 12 months  ENT ROS: No hearing, Tinnitis, sinus or taste problems  Hematological and Lymphatic ROS:No Lymphoma, Von Willebrand's, Hemophillia or Bleeding History  Psych ROS: No Depression, Homicidal thoughts,suicidal thoughts, or anxiety  Breast ROS: No prior breast abnormalities or lumps  Respiratory ROS: No SOB, Pneumoniae,Cough, or Pulmonary Embolism History  Cardiovascular ROS: No Chest Pain with Exertion, Palpitations, Syncope, Edema, Arrhythmia  Gastrointestinal ROS: No Indigestion, Heartburn, Nausea, vomiting, Diarrhea, Constipation,or Bowel Changes;  No Bloody Stools or melena  Genito-Urinary ROS: No Dysuria, Hematuria or Nocturia. No Urinary Incontinence or Vaginal Discharge. + vaginal itching  Musculoskeletal ROS: No Arthralgia, Arthritis,Gout,Osteoporosis or Rheumatism  Neurological ROS: No CVA, Migraines, Epilepsy, Seizure Hx, or Limb Weakness  Dermatological ROS: No Rash, Itching, Hives, Mole Changes or Cancer          Blood pressure 134/88, height 5' 1\" (1.549 m), weight 183 lb (83 kg), last menstrual period 11/23/2021, not currently breastfeeding. Chaperone for Intimate Exam   Chaperone was offered and accepted as part of the rooming process.  Chaperone: Ariadna         Abdomen: Soft non-tender; good bowel sounds. No guarding, rebound or rigidity. No CVA tenderness bilaterally. Extremities: No calf tenderness, DTR 2/4, and No edema bilaterally    Pelvic: Vulva and vagina appear normal. Bimanual exam reveals normal uterus and adnexa. No genital warts, ulceration, herpes lesions noted on perineum/ labia. Diagnostics:  No results found. Lab Results:  Results for orders placed or performed during the hospital encounter of 10/18/21   Rubeola Antibody, IgG   Result Value Ref Range    MEASLES ANTIBODY IGG 1.05 (L) >1.09   Mumps Antibody, IgG   Result Value Ref Range    Mumps IgG 2.41 >1.09   Rubella antibody, IgG   Result Value Ref Range    Rubella Antibody, IGG 33.9 IU/mL   T-Spot TB Test   Result Value Ref Range    T-Spot TB Test          Varicella Zoster Antibody, IgG   Result Value Ref Range    VARICELLA ZOSTER AB IGG 2.29 >1.09         Assessment:   Diagnosis Orders   1.  Acute vaginitis  C.trachomatis N.gonorrhoeae DNA    VAGINITIS DNA PROBE   2. Screening for STDs (sexually transmitted diseases)  Hepatitis B Surface Antigen    Hepatitis C Antibody    HERPES PROFILE    HIV Screen    T. pallidum Ab     Patient Active Problem List    Diagnosis Date Noted    Vitamin B 12 deficiency 08/08/2021    Status post appendectomy 08/05/2021    Enlarged tonsils 11/03/2020    History of uterine fibroid 06/29/2020    Family history of premature CAD 02/21/2020    Heart palpitations 02/13/2020    Chronic fatigue 02/13/2020    Hot flashes 02/13/2020    Major depressive disorder with single episode, in full remission ([facility] [address] 02/13/2020    Abnormal EKG 02/13/2020    Anxiety 09/20/2019    History of colon polyps 03/10/2019    Slow transit constipation 03/10/2019    Asthma, exercise induced 10/19/2018    Snoring 07/02/2018    History of endometrial ablation 05/22/2017     2001      Hyperlipidemia with target LDL less than 100 05/22/2017    Hyperglycemia 05/22/2017    Prediabetes 05/22/2017    Severe obesity (BMI 35.0-35.9 with comorbidity) ([facility] [address] 05/22/2017    History of kidney stones 05/22/2017    Herpes simplex type 1 infection 05/23/2016 5/23/2016 + herpes simplex type I IgG antibody      History of tubal ligation     Vision abnormalities      glasses/contacts             PLAN:  Return if symptoms worsen or fail to improve. Vaginal cultures collected. Lab slips given. Repeat Annual every 1 year  Cervical Cytology Evaluation begins at 23years old. If Negative Cytology, Follow-up screening per current guidelines. Return to the office in PRN weeks. Counseled on preventative health maintenance follow-up.   Orders Placed This Encounter   Procedures    C.trachomatis N.gonorrhoeae DNA     Standing Status:   Future     Standing Expiration Date:   12/27/2022    VAGINITIS DNA PROBE     Standing Status:   Future     Standing Expiration Date:   12/27/2022    Hepatitis B Surface Antigen     Standing Status:   Future     Standing Expiration Date:   12/28/2022    Hepatitis C Antibody     Standing Status:   Future     Standing Expiration Date:   12/28/2022    HERPES PROFILE     Standing Status:   Future     Standing Expiration Date:   1/27/2022    HIV Screen     Standing Status:   Future     Standing Expiration Date:   12/28/2022    T. pallidum Ab     Standing Status:   Future     Standing Expiration Date:   1/27/2022 The patient, Becca Garcia is a 51 y.o. female, was seen with a total time spent of 20 minutes for the visit on this date of service by the E/M provider. The time component had both face to face and non face to face time spent in determining the total time component. Counseling and education regarding her diagnosis listed below and her options regarding those diagnoses were also included in determining her time component. Diagnosis Orders   1. Acute vaginitis  C.trachomatis N.gonorrhoeae DNA    VAGINITIS DNA PROBE   2. Screening for STDs (sexually transmitted diseases)  Hepatitis B Surface Antigen    Hepatitis C Antibody    HERPES PROFILE    HIV Screen    T. pallidum Ab        The patient had her preventative health maintenance recommendations and follow-up reviewed with her at the completion of her visit.

## 2022-01-03 ENCOUNTER — TELEPHONE (OUTPATIENT)
Dept: OBGYN CLINIC | Age: 47
End: 2022-01-03

## 2022-01-03 DIAGNOSIS — N76.0 ACUTE VAGINITIS: ICD-10-CM

## 2022-01-03 RX ORDER — FLUCONAZOLE 150 MG/1
150 TABLET ORAL ONCE
Qty: 2 TABLET | Refills: 0 | Status: SHIPPED | OUTPATIENT
Start: 2022-01-03 | End: 2022-01-03

## 2022-01-03 NOTE — TELEPHONE ENCOUNTER
Per Dr Tian Winslow patient notified of +yeast infection on culture and pt to get Diflucan 150mg sent to pt pharm. Pt notified via [employer] message.

## 2022-02-07 ENCOUNTER — PATIENT MESSAGE (OUTPATIENT)
Dept: OBGYN CLINIC | Age: 47
End: 2022-02-07

## 2022-02-07 RX ORDER — FLUCONAZOLE 150 MG/1
150 TABLET ORAL ONCE
Qty: 2 TABLET | Refills: 0 | Status: SHIPPED | OUTPATIENT
Start: 2022-02-07 | End: 2022-03-31 | Stop reason: SDUPTHER

## 2022-02-07 NOTE — TELEPHONE ENCOUNTER
From: Neptali Herndon  To: Felicia Vallesr Beam  Sent: 2/7/2022 3:35 PM EST  Subject: Yeast infection    Can I get something for a yeast infection , preferably no cream but a pill? I have white discharge (looks like [place] to reference food) and a little itchy.

## 2022-03-15 ENCOUNTER — E-VISIT (OUTPATIENT)
Dept: FAMILY MEDICINE CLINIC | Age: 47
End: 2022-03-15
Payer: COMMERCIAL

## 2022-03-15 DIAGNOSIS — J01.80 ACUTE NON-RECURRENT SINUSITIS OF OTHER SINUS: Primary | ICD-10-CM

## 2022-03-15 PROCEDURE — 99422 OL DIG E/M SVC 11-20 MIN: CPT | Performed by: FAMILY MEDICINE

## 2022-03-15 RX ORDER — AZITHROMYCIN 250 MG/1
TABLET, FILM COATED ORAL
Qty: 6 TABLET | Refills: 0 | Status: SHIPPED | OUTPATIENT
Start: 2022-03-15 | End: 2022-03-20

## 2022-03-15 ASSESSMENT — LIFESTYLE VARIABLES: SMOKING_STATUS: NO, I'VE NEVER SMOKED

## 2022-03-15 NOTE — PROGRESS NOTES
HPI: per patient's questionnaire    EXAM: not applicable    Diagnoses and all orders for this visit:    1. Acute non-recurrent sinusitis of other sinus  Failed to resolve  - azithromycin (ZITHROMAX) 250 MG tablet; 500 mg orally on day one followed by 250 mg daily on days two through five  Dispense: 6 tablet; Refill: 0  We will advise nasal saline from over-the-counter    No orders of the defined types were placed in this encounter. Patient reports via [email or web address] that she had COVID-19 test which was negative    I also receive her immunizations, updated immunizations, and she is up-to-date with COVID-19 vaccine    BP Readings from Last 3 Encounters:   12/28/21 134/88   10/07/21 114/72   08/05/21 116/78         Patient was advised to contact PCP if symptoms worsen or failing to change as expected        11-20 minutes were spent on the digital evaluation and management of this patient.   Electronically signed by Akilah Blair MD on 3/15/22 at  11:30 AM

## 2022-03-31 RX ORDER — FLUCONAZOLE 150 MG/1
150 TABLET ORAL ONCE
Qty: 2 TABLET | Refills: 0 | Status: SHIPPED | OUTPATIENT
Start: 2022-03-31 | End: 2022-03-31

## 2022-04-01 ENCOUNTER — HOSPITAL ENCOUNTER (OUTPATIENT)
Dept: MAMMOGRAPHY | Age: 47
Discharge: HOME | End: 2022-04-03
Payer: COMMERCIAL

## 2022-04-01 ENCOUNTER — HOSPITAL ENCOUNTER (OUTPATIENT)
Age: 47
Discharge: HOME | End: 2022-04-01
Payer: COMMERCIAL

## 2022-04-01 ENCOUNTER — HOSPITAL ENCOUNTER (OUTPATIENT)
Dept: ULTRASOUND IMAGING | Age: 47
Discharge: HOME | End: 2022-04-03
Payer: COMMERCIAL

## 2022-04-01 ENCOUNTER — TELEPHONE (OUTPATIENT)
Dept: OBGYN CLINIC | Age: 47
End: 2022-04-01

## 2022-04-01 DIAGNOSIS — E66.01 SEVERE OBESITY (BMI 35.0-35.9 WITH COMORBIDITY) (HCC): ICD-10-CM

## 2022-04-01 DIAGNOSIS — Z12.31 ENCOUNTER FOR SCREENING MAMMOGRAM FOR MALIGNANT NEOPLASM OF BREAST: ICD-10-CM

## 2022-04-01 DIAGNOSIS — R92.8 ABNORMAL MAMMOGRAM: ICD-10-CM

## 2022-04-01 DIAGNOSIS — E78.5 HYPERLIPIDEMIA WITH TARGET LDL LESS THAN 100: ICD-10-CM

## 2022-04-01 DIAGNOSIS — R73.03 PREDIABETES: ICD-10-CM

## 2022-04-01 DIAGNOSIS — Z11.3 SCREENING FOR STDS (SEXUALLY TRANSMITTED DISEASES): ICD-10-CM

## 2022-04-01 DIAGNOSIS — N64.4 BREAST PAIN: ICD-10-CM

## 2022-04-01 LAB
ALBUMIN SERPL-MCNC: 4.4 G/DL (ref 3.5–5.2)
ALBUMIN/GLOBULIN RATIO: 1.4 (ref 1–2.5)
ALP BLD-CCNC: 65 U/L (ref 35–104)
ALT SERPL-CCNC: 11 U/L (ref 5–33)
ANION GAP SERPL CALCULATED.3IONS-SCNC: 12 MMOL/L (ref 9–17)
AST SERPL-CCNC: 11 U/L
BILIRUB SERPL-MCNC: 0.41 MG/DL (ref 0.3–1.2)
BUN BLDV-MCNC: 8 MG/DL (ref 6–20)
CALCIUM SERPL-MCNC: 9.3 MG/DL (ref 8.6–10.4)
CHLORIDE BLD-SCNC: 101 MMOL/L (ref 98–107)
CHOLESTEROL/HDL RATIO: 4.3
CHOLESTEROL: 221 MG/DL
CO2: 25 MMOL/L (ref 20–31)
CREAT SERPL-MCNC: 0.81 MG/DL (ref 0.5–0.9)
ESTIMATED AVERAGE GLUCOSE: 126 MG/DL
GFR AFRICAN AMERICAN: >60 ML/MIN
GFR NON-AFRICAN AMERICAN: >60 ML/MIN
GFR SERPL CREATININE-BSD FRML MDRD: NORMAL ML/MIN/{1.73_M2}
GLUCOSE BLD-MCNC: 89 MG/DL (ref 70–99)
HBA1C MFR BLD: 6 % (ref 4–6)
HDLC SERPL-MCNC: 51 MG/DL
HEPATITIS B SURFACE ANTIGEN: NONREACTIVE
HEPATITIS C ANTIBODY: NONREACTIVE
LDL CHOLESTEROL: 129 MG/DL (ref 0–130)
POTASSIUM SERPL-SCNC: 4.3 MMOL/L (ref 3.7–5.3)
SODIUM BLD-SCNC: 138 MMOL/L (ref 135–144)
T. PALLIDUM, IGG: NONREACTIVE
TOTAL PROTEIN: 7.5 G/DL (ref 6.4–8.3)
TRIGL SERPL-MCNC: 207 MG/DL

## 2022-04-01 PROCEDURE — 86695 HERPES SIMPLEX TYPE 1 TEST: CPT

## 2022-04-01 PROCEDURE — 87340 HEPATITIS B SURFACE AG IA: CPT

## 2022-04-01 PROCEDURE — 86696 HERPES SIMPLEX TYPE 2 TEST: CPT

## 2022-04-01 PROCEDURE — 80053 COMPREHEN METABOLIC PANEL: CPT

## 2022-04-01 PROCEDURE — 87389 HIV-1 AG W/HIV-1&-2 AB AG IA: CPT

## 2022-04-01 PROCEDURE — 86694 HERPES SIMPLEX NES ANTBDY: CPT

## 2022-04-01 PROCEDURE — 86803 HEPATITIS C AB TEST: CPT

## 2022-04-01 PROCEDURE — 36415 COLL VENOUS BLD VENIPUNCTURE: CPT

## 2022-04-01 PROCEDURE — 82746 ASSAY OF FOLIC ACID SERUM: CPT

## 2022-04-01 PROCEDURE — 80061 LIPID PANEL: CPT

## 2022-04-01 PROCEDURE — G0279 TOMOSYNTHESIS, MAMMO: HCPCS

## 2022-04-01 PROCEDURE — 82607 VITAMIN B-12: CPT

## 2022-04-01 PROCEDURE — 86780 TREPONEMA PALLIDUM: CPT

## 2022-04-01 PROCEDURE — 83036 HEMOGLOBIN GLYCOSYLATED A1C: CPT

## 2022-04-01 NOTE — TELEPHONE ENCOUNTER
----- Message from MATT Ibrahim CNP sent at 4/1/2022 11:33 AM EDT -----  Diagnostic mammogram is negative. Normal interval follow up recommended in 12 months.   Please let patient know

## 2022-04-02 LAB
FOLATE: 12.3 NG/ML
VITAMIN B-12: 370 PG/ML (ref 232–1245)

## 2022-04-03 NOTE — RESULT ENCOUNTER NOTE
Please notify patient: Vitamin B12 towards lower side but improving, to continue vitamin B12 1000 MCG daily with food. .  Worsening prediabetes, hemoglobin A1c 6, it was 5.6 before  High lipids and triglycerides, would benefit from starting Lipitor, please let me know she agrees, and I can send it to the pharmacy  Otherwise labs within normal limits  continue current treatment    Future Appointments  4/29/2022  8:30 AM    Dulce Knutson MD     [facility]

## 2022-04-04 ENCOUNTER — PATIENT MESSAGE (OUTPATIENT)
Dept: FAMILY MEDICINE CLINIC | Age: 47
End: 2022-04-04

## 2022-04-04 DIAGNOSIS — R73.03 PREDIABETES: ICD-10-CM

## 2022-04-04 DIAGNOSIS — E78.5 HYPERLIPIDEMIA WITH TARGET LDL LESS THAN 100: Primary | ICD-10-CM

## 2022-04-04 NOTE — RESULT ENCOUNTER NOTE
Noted, MyChart patient sent to the patient for clarifying the statin  Future Appointments  4/29/2022  8:30 AM    Jorge Tirado MD     [facility]

## 2022-04-05 ENCOUNTER — TELEPHONE (OUTPATIENT)
Dept: OBGYN CLINIC | Age: 47
End: 2022-04-05

## 2022-04-05 LAB
HERPES SIMPLEX VIRUS 1 IGG: 18.51
HERPES SIMPLEX VIRUS 2 IGG: 0.8
HERPES TYPE 1/2 IGM COMBINED: 0.38
HIV AG/AB: NONREACTIVE

## 2022-04-05 NOTE — TELEPHONE ENCOUNTER
----- Message from MATT Rdz CNP sent at 4/5/2022 12:30 PM EDT -----  HSV type I positive with no recent or active outbreak.   If patient having symptoms- treat with valtrex 500mg pO BID x 10 days if has been treated with valtrex in past.

## 2022-04-11 RX ORDER — PRAVASTATIN SODIUM 40 MG
40 TABLET ORAL EVERY EVENING
Qty: 90 TABLET | Refills: 3 | Status: SHIPPED | OUTPATIENT
Start: 2022-04-11 | End: 2022-08-27 | Stop reason: SDUPTHER

## 2022-04-11 NOTE — TELEPHONE ENCOUNTER
From: Dr. Latanya Gutiérrez  To: Gena Ellis: 4/4/2022 10:39 AM EDT  Subject: Atorvastatin= Lipitor    Crystal,    Statins are medications that we use for high cholesterol to prevent strokes and heart attacks. Most common side effects just the most common side effect are muscle cramps, and possibly worsening liver function tests, however we do monitor liver tests all the time by checking blood work like every 3 to 6 months. These are your past 3 numbers in the computer and the numbers have been consistently high you are one of the persons who would benefit. There are other statins that could be prescribed like Crestor, rosuvastatin , or milder once, like pravastatin or Mevacor. They were not removed from the market. Again when we start statins,  Every 3 to 6 months we do monitor the the liver, and then every 3 to 6 to 12 months we monitor the lipids    Lab Results   Component Value Date    CHOL 221 (H) 04/01/2022    CHOL 227 (H) 02/14/2020    CHOL 194 11/03/2018     Lab Results   Component Value Date    TRIG 207 (H) 04/01/2022    TRIG 262 (H) 02/14/2020    TRIG 156 (H) 11/03/2018     Lab Results   Component Value Date    HDL 51 04/01/2022    HDL 47 07/23/2021    HDL 42 02/14/2020     Lab Results   Component Value Date    LDLCHOLESTEROL 129 04/01/2022    LDLCHOLESTEROL 122 07/23/2021    LDLCHOLESTEROL 133 (H) 02/14/2020     Lab Results   Component Value Date    VLDL NOT REPORTED (H) 07/23/2021    VLDL NOT REPORTED (H) 02/14/2020    VLDL NOT REPORTED 11/03/2018     Lab Results   Component Value Date    CHOLHDLRATIO 4.3 04/01/2022    CHOLHDLRATIO 4.5 07/23/2021    CHOLHDLRATIO 5.4 (H) 02/14/2020         If you have any questions, please let me know.     Latanya Gutiérrez MD  [address]  [address] [address]  Dept: [phone]  Dept Fax: [phone]

## 2022-04-12 RX ORDER — METFORMIN HYDROCHLORIDE 500 MG/1
500 TABLET, EXTENDED RELEASE ORAL
Qty: 30 TABLET | Refills: 3 | Status: SHIPPED
Start: 2022-04-12 | End: 2022-05-04

## 2022-05-04 ENCOUNTER — OFFICE VISIT (OUTPATIENT)
Dept: FAMILY MEDICINE CLINIC | Age: 47
End: 2022-05-04
Payer: COMMERCIAL

## 2022-05-04 VITALS
BODY MASS INDEX: 34.36 KG/M2 | WEIGHT: 182 LBS | OXYGEN SATURATION: 98 % | DIASTOLIC BLOOD PRESSURE: 80 MMHG | HEIGHT: 61 IN | SYSTOLIC BLOOD PRESSURE: 130 MMHG | TEMPERATURE: 98 F | HEART RATE: 74 BPM

## 2022-05-04 DIAGNOSIS — Z23 ENCOUNTER FOR IMMUNIZATION: ICD-10-CM

## 2022-05-04 DIAGNOSIS — E78.5 HYPERLIPIDEMIA WITH TARGET LDL LESS THAN 100: ICD-10-CM

## 2022-05-04 DIAGNOSIS — J30.89 SEASONAL ALLERGIC RHINITIS DUE TO OTHER ALLERGIC TRIGGER: ICD-10-CM

## 2022-05-04 DIAGNOSIS — J45.20 MILD INTERMITTENT ASTHMA WITHOUT COMPLICATION: ICD-10-CM

## 2022-05-04 DIAGNOSIS — G43.009 MIGRAINE WITHOUT AURA AND WITHOUT STATUS MIGRAINOSUS, NOT INTRACTABLE: Primary | ICD-10-CM

## 2022-05-04 DIAGNOSIS — R73.03 PREDIABETES: ICD-10-CM

## 2022-05-04 DIAGNOSIS — E66.9 OBESITY (BMI 30.0-34.9): ICD-10-CM

## 2022-05-04 PROCEDURE — 90707 MMR VACCINE SC: CPT | Performed by: FAMILY MEDICINE

## 2022-05-04 PROCEDURE — 99214 OFFICE O/P EST MOD 30 MIN: CPT | Performed by: FAMILY MEDICINE

## 2022-05-04 PROCEDURE — 90471 IMMUNIZATION ADMIN: CPT | Performed by: FAMILY MEDICINE

## 2022-05-04 RX ORDER — ONDANSETRON 4 MG/1
4 TABLET, FILM COATED ORAL EVERY 6 HOURS PRN
Qty: 24 TABLET | Refills: 0 | Status: SHIPPED | OUTPATIENT
Start: 2022-05-04

## 2022-05-04 RX ORDER — MONTELUKAST SODIUM 10 MG/1
10 TABLET ORAL NIGHTLY
Qty: 30 TABLET | Refills: 3 | Status: SHIPPED | OUTPATIENT
Start: 2022-05-04

## 2022-05-04 RX ORDER — SUMATRIPTAN 50 MG/1
50 TABLET, FILM COATED ORAL
Qty: 9 TABLET | Refills: 5 | Status: SHIPPED | OUTPATIENT
Start: 2022-05-04 | End: 2022-08-24

## 2022-05-04 RX ORDER — FLUTICASONE PROPIONATE 50 MCG
2 SPRAY, SUSPENSION (ML) NASAL DAILY
Qty: 16 G | Refills: 3 | Status: SHIPPED | OUTPATIENT
Start: 2022-05-04 | End: 2022-08-28 | Stop reason: SDUPTHER

## 2022-05-04 ASSESSMENT — PATIENT HEALTH QUESTIONNAIRE - PHQ9
1. LITTLE INTEREST OR PLEASURE IN DOING THINGS: 0
2. FEELING DOWN, DEPRESSED OR HOPELESS: 0
6. FEELING BAD ABOUT YOURSELF - OR THAT YOU ARE A FAILURE OR HAVE LET YOURSELF OR YOUR FAMILY DOWN: 0
10. IF YOU CHECKED OFF ANY PROBLEMS, HOW DIFFICULT HAVE THESE PROBLEMS MADE IT FOR YOU TO DO YOUR WORK, TAKE CARE OF THINGS AT HOME, OR GET ALONG WITH OTHER PEOPLE: 0
SUM OF ALL RESPONSES TO PHQ QUESTIONS 1-9: 0
4. FEELING TIRED OR HAVING LITTLE ENERGY: 0
SUM OF ALL RESPONSES TO PHQ9 QUESTIONS 1 & 2: 0
5. POOR APPETITE OR OVEREATING: 0
7. TROUBLE CONCENTRATING ON THINGS, SUCH AS READING THE NEWSPAPER OR WATCHING TELEVISION: 0
9. THOUGHTS THAT YOU WOULD BE BETTER OFF DEAD, OR OF HURTING YOURSELF: 0
3. TROUBLE FALLING OR STAYING ASLEEP: 0
SUM OF ALL RESPONSES TO PHQ QUESTIONS 1-9: 0
SUM OF ALL RESPONSES TO PHQ QUESTIONS 1-9: 0
8. MOVING OR SPEAKING SO SLOWLY THAT OTHER PEOPLE COULD HAVE NOTICED. OR THE OPPOSITE, BEING SO FIGETY OR RESTLESS THAT YOU HAVE BEEN MOVING AROUND A LOT MORE THAN USUAL: 0
SUM OF ALL RESPONSES TO PHQ QUESTIONS 1-9: 0

## 2022-05-04 ASSESSMENT — ENCOUNTER SYMPTOMS
CHEST TIGHTNESS: 0
RHINORRHEA: 1
NAUSEA: 0
SHORTNESS OF BREATH: 1
ABDOMINAL PAIN: 0
VOMITING: 0
CONSTIPATION: 0
ABDOMINAL DISTENTION: 0
COUGH: 0
DIARRHEA: 0
WHEEZING: 1

## 2022-05-04 NOTE — PROGRESS NOTES
Carlin Odom (:  1975) is a 49 y.o. female,Established patient, here for evaluation of the following chief complaint(s): Migraine, Other (states she wouldlike her ears checked they have been itchy ), Hyperglycemia, Allergies, and Asthma      ASSESSMENT/PLAN:    1. Migraine without aura and without status migrainosus, not intractable  Failing to resolve   Cut down on ibuprofen  Continue magnesium 400 mg and vitamin B2 as preventative  -     SUMAtriptan (IMITREX) 50 MG tablet; Take 1 tablet by mouth once as needed for Migraine, Disp-9 tablet, R-5Normal  -     ondansetron (ZOFRAN) 4 MG tablet; Take 1 tablet by mouth every 6 hours as needed for Nausea, Vomiting or Other (headache), Disp-24 tablet, R-0Normal    Call or return if symptoms persist or fail to improve, if not resolving, we will do an MRI of the brain. I advised Carlin Odom to make appointment with Ophthalmology . The patient verbalizes understanding and agrees with the plan. 2. Seasonal allergic rhinitis due to other allergic trigger  Worsening  -To restart correct dosage  montelukast (SINGULAIR) 10 MG tablet; Take 1 tablet by mouth nightly, Disp-30 tablet, R-3Normal  -   To restart fluticasone (FLONASE) 50 MCG/ACT nasal spray; 2 sprays by Nasal route daily, Disp-16 g, R-3Normal    3. Prediabetes  Worsening  Lab Results   Component Value Date    LABA1C 6.0 2022    LABA1C 5.6 2021    LABA1C 5.9 2020     She self discontinued metformin due to diarrhea  -   To start semaglutide 3 MG TABS; Take 3 mg by mouth daily 1st step, Disp-30 tablet, [id number] gave her a coupon and will do prior authorization  -     TSH; Future  Low carb, low fat diet, increase fruits and vegetables, and exercise 4-5 times a week 30-40 minutes a day, or walk 1-2 hours per day, or wear a pedometer and get at least 10,000 steps per day.     4. Hyperlipidemia with target LDL less than 100  Inadequately controlled    Lab Results   Component Value Date    LDLCHOLESTEROL 129 04/01/2022   Recheck labs in 3 months before the next appointment  Intensive lifestyle changes discussed, low-carb diet, low-fat diet, exercise, and lose weight  Continue pravastatin 40 mg in the evening which was recently started  -     Lipid Panel; Future  -     AST; Future  -     ALT; Future    5. Obesity (BMI 30.0-34. 9)  Improving  Lost 5 lbs in 9 months  Lost 24 lbs in 2 years    Wt Readings from Last 3 Encounters:   05/04/22 182 lb (82.6 kg)   12/28/21 183 lb (83 kg)   10/28/21 178 lb (80.7 kg)     08/05/21 187 lb (84.8 kg)   07/23/21 187 lb (84.8 kg)   06/03/21 193 lb 11.2 oz (87.9 kg)      02/05/21 194 lb (88 kg)   01/18/21 192 lb (87.1 kg)   01/05/21 192 lb 11.2 oz (87.4 kg)         02/13/20 206 lb (93.4 kg)         -     TSH; Future  Low carb, low fat diet, increase fruits and vegetables, and exercise 4-5 times a week 30-40 minutes a day, or walk 1-2 hours per day, or wear a pedometer and get at least 10,000 steps per day. Benefits from Adipex , will return for Adipex after June 28, 2022    6. Encounter for immunization  -     MMR vaccine subcutaneous    7. Mild intermittent asthma without complication  Worsening  Restart Singulair at appropriate dosage 10 mg, continue albuterol as needed     11/28/2021 11/23/2021   1  Phentermine 37.5 Mg Tablet       Controlled Substance Monitoring:    Acute and Chronic Pain Monitoring:   RX Monitoring 5/9/2022   Attestation -   Periodic Controlled Substance Monitoring Possible medication side effects, risk of tolerance/dependence & alternative treatments discussed. ;No signs of potential drug abuse or diversion identified. ;Assessed functional status. Chronic Pain > 50 MEDD -         Crystal received counseling on the following healthy behaviors: nutrition, exercise, medication adherence and weight loss. Reviewed prior labs and health maintenance  Discussed use, benefit, and side effects of prescribed medications.    Barriers to medication compliance addressed. Patient given educational materials - see patient instructions  All patient questions answered. Patient voiced understanding. The patient's past medical,surgical, social, and family history as well as her current medications and allergies were reviewed as documented in today's encounter. Medications, labs, diagnostic studies, consultations and follow-up as documented in this encounter. Return in about 3 months (around 8/5/2022) for Visit type PHYSICAL, VISION screen, PHQ9. .    Give coupon. PLEASE SUBMIT prior authorization. No future appointments. SUBJECTIVE/OBJECTIVE:    HPI    Patient complains of worsening headaches. She says she has been having this headache for 1.5 weeks. Headaches are located frontal and occipital up, was present up to 2 days ago. Now just has a mild headache. Feels likes pressure left occipital and neck. Had eye exam awhile ago. I advised Dominica Hatchet to make appointment with Ophthalmology. The patient verbalizes understanding and agrees with the plan. Started magnesium and B2, which she feels it is helping. Had nausea, when headache was severe. Had associated photophobia and phonophobia    Had headaches before, but much shorter, lasted for 3-4 hrs at a time. Caffeine makes her jittery, cannot take Excedrin, taking  Ibuprofen    She reports allergies flaring up, with runny nose, stuffy nose, sneezing, postnasal drip, clear mucus. Has been having some wheezing since the weather changed. She is not taking Singulair or Flonase at this time. Hyperglycemia and prediabetes worsening  Patient reports she developed diarrhea and she stopped Metformin  Denies increased appetite, thirst or polyuria. A1c worsening  Lab Results   Component Value Date    LABA1C 6.0 04/01/2022    LABA1C 5.6 07/23/2021    LABA1C 5.9 11/03/2020       Asthma:  Current treatment includes beta agonist inhalers.     Using preventive medication(s) consistently: no- STOPPED SINGULAIR. Residual symptoms: dyspnea and wheezing since the weather changed. Patient denies chest pain/tightness. She requires her rescue inhaler 3 time(s) per week. She does not smoke  Counseling given: Yes      Hyperlipidemia:  No new myalgias or GI upset on pravastatin (Pravachol). Just started Pravastatin   Medication compliance: compliant all of the time. Patient is  following a low fat, low cholesterol diet. LDL is high with high triglycerides  Lab Results   Component Value Date    LDLCHOLESTEROL 129 04/01/2022     Lab Results   Component Value Date    TRIG 207 (H) 04/01/2022    TRIG 262 (H) 02/14/2020    TRIG 156 (H) 11/03/2018     Obesity per BMI improving  Lost 5 lbs in 9 months  Lost 24 lbs in 2 years  She wants to restart Adipex, advised  Oriented to go back  Wt Readings from Last 3 Encounters:   05/04/22 182 lb (82.6 kg)   12/28/21 183 lb (83 kg)   10/28/21 178 lb (80.7 kg)       08/05/21 187 lb (84.8 kg)   07/23/21 187 lb (84.8 kg)   06/03/21 193 lb 11.2 oz (87.9 kg)      02/05/21 194 lb (88 kg)   01/18/21 192 lb (87.1 kg)   01/05/21 192 lb 11.2 oz (87.4 kg)         02/13/20 206 lb (93.4 kg)        [x]Negative depression screening. PHQ Scores 5/4/2022 9/29/2021 8/5/2021 7/23/2021 2/5/2021 7/10/2020 5/21/2020   PHQ2 Score 0 0 0 0 0 0 0   PHQ9 Score 0 0 0 0 0 0 0         Prior to Visit Medications    Medication Sig Taking?  Authorizing Provider   pravastatin (PRAVACHOL) 40 MG tablet Take 1 tablet by mouth every evening With food, for high cholesterol Yes Shade Blount MD   fluticasone (FLOVENT HFA) 44 MCG/ACT inhaler Inhale 2 puffs into the lungs 2 times daily Yes Shade Blount MD   albuterol sulfate HFA (PROAIR HFA) 108 (90 Base) MCG/ACT inhaler Inhale 2 puffs into the lungs every 6 hours as needed for Wheezing or Shortness of Breath (cough) Yes Shade Blount MD   cyanocobalamin ([employer] VITAMIN B12) 1000 MCG tablet Take 1 tablet by mouth daily Yes Lena Rachel Becker MD   Magnesium 400 MG CAPS Take by mouth Yes Historical Provider, MD   metFORMIN (GLUCOPHAGE-XR) 500 MG extended release tablet Take 1 tablet by mouth daily (with breakfast)  Patient not taking: Reported on 5/4/2022  Dulce Knutson MD   montelukast (SINGULAIR) 4 MG chewable tablet Take 1 tablet by mouth nightly  Patient not taking: Reported on 5/4/2022  Dulce Knutson MD   fluticasone (FLONASE) 50 MCG/ACT nasal spray 2 sprays by Nasal route nightly  Patient not taking: Reported on 5/4/2022  Dulce Knutson MD       Social History     Tobacco Use    Smoking status: Never Smoker    Smokeless tobacco: Never Used   Vaping Use    Vaping Use: Never used   Substance Use Topics    Alcohol use: Yes     Alcohol/week: 0.0 standard drinks     Comment: Socially    Drug use: No         Review of Systems   Constitutional: Negative for activity change, appetite change, chills, diaphoresis, fatigue, fever and unexpected weight change. HENT: Positive for congestion, postnasal drip, rhinorrhea and sneezing. Negative for sinus pressure and sinus pain. Eyes: Positive for visual disturbance. Respiratory: Positive for shortness of breath and wheezing. Negative for cough and chest tightness. Cardiovascular: Negative for chest pain, palpitations and leg swelling. Gastrointestinal: Negative for abdominal distention, abdominal pain, constipation, diarrhea, nausea and vomiting. Endocrine: Negative for cold intolerance, heat intolerance, polydipsia, polyphagia and polyuria. Allergic/Immunologic: Positive for environmental allergies. Neurological: Positive for headaches. Negative for dizziness, weakness, light-headedness and numbness. Hematological: Does not bruise/bleed easily. Psychiatric/Behavioral: Negative for dysphoric mood.            -vital signs stable and within normal limits except obesity per BMI  /80   Pulse 74   Temp 98 °F (36.7 °C)   Ht 5' 1\" (1.549 m)   Wt 182 lb (82.6 kg)   SpO2 98%   BMI 34.39 kg/m²      Physical Exam  Vitals and nursing note reviewed. Constitutional:       General: She is not in acute distress. Appearance: Normal appearance. She is well-developed. She is obese. She is not diaphoretic. HENT:      Head: Normocephalic and atraumatic. Right Ear: External ear normal. A middle ear effusion is present. Tympanic membrane is not injected. Left Ear: External ear normal. A middle ear effusion is present. Tympanic membrane is not injected. Nose:      Right Sinus: No maxillary sinus tenderness or frontal sinus tenderness. Left Sinus: No maxillary sinus tenderness or frontal sinus tenderness. Mouth/Throat:      Comments: I did not examine the mouth due to coronavirus pandemic and wearing masks    Eyes:      General: Lids are normal. No scleral icterus. Right eye: No discharge. Left eye: No discharge. Extraocular Movements: Extraocular movements intact. Conjunctiva/sclera: Conjunctivae normal.   Neck:      Thyroid: No thyromegaly. Cardiovascular:      Rate and Rhythm: Normal rate and regular rhythm. Heart sounds: Normal heart sounds. No murmur heard. Pulmonary:      Effort: Pulmonary effort is normal. No respiratory distress. Breath sounds: Normal breath sounds. No wheezing or rales. Chest:      Chest wall: No tenderness. Abdominal:      General: Bowel sounds are normal. There is no distension. Palpations: Abdomen is soft. There is no hepatomegaly or splenomegaly. Tenderness: There is no abdominal tenderness. Comments: Obese abdomen   Musculoskeletal:         General: No tenderness. Normal range of motion. Cervical back: Normal range of motion and neck supple. Right lower leg: No edema. Left lower leg: No edema. Skin:     General: Skin is warm and dry. Capillary Refill: Capillary refill takes less than 2 seconds. Findings: No rash.    Neurological: Mental Status: She is alert and oriented to person, place, and time. Cranial Nerves: No cranial nerve deficit. Motor: No abnormal muscle tone. Psychiatric:         Mood and Affect: Mood normal.         Behavior: Behavior normal.         Thought Content: Thought content normal.         Judgment: Judgment normal.             I personally reviewed testing . Discussed testing with the patient and all questions fully answered. Hyperlipidemia and high triglycerides    Prediabetes worsening  Otherwise labs within normal limits    Hospital Outpatient Visit on 04/01/2022   Component Date Value Ref Range Status    Hepatitis B Surface Ag 04/01/2022 NONREACTIVE  NONREACTIVE Final    Hepatitis C Ab 04/01/2022 NONREACTIVE  NONREACTIVE Final    Comment:       The hepatitis C procedure used in our laboratory is a Chemiluminescent test specific for   three recombinant HCV antigens. A negative anti-HCV result indicates that the antibodies to   hepatitis C virus are not present at this time. Individuals with reactive anti-HCV should be considered infected and infectious until proven   otherwise. Confirmation of all equivocal or reactive results is recommended by ordering   HCV RNA by PCR.  HSV I IgG 04/01/2022 18.51* <0.91 Final    Comment:    Reference Range:     <=0.90       Negative  0.91-1.09    Equivocal  >=1.10       Positive            HSV II IgG 04/01/2022 0.80  <0.91 Final    Comment:    Reference Range:     <=0.90       Negative  0.91-1.09    Equivocal  >=1.10       Positive            Herpes Type 1/2 IgM Combined 04/01/2022 0.38  <0.91 Final    Comment:    Reference Range:  <=0.90       Negative  0.91-1.09    Equivocal  >=1.10       Positive     If positive, an IgM result indicates a current or recent infection. This test does not   differentiate type I from type II. No current reference test is available for this.   If IgG   tests are ordered and are negative, consider retesting in 2 to 3 weeks.      HIV Ag/Ab 04/01/2022 NONREACTIVE  NONREACTIVE Final    Comment: No laboratory evidence of HIV infection. If acute HIV infection is suspected, consider   testing for HIV-1 RNA.  T. pallidum, IgG 04/01/2022 NONREACTIVE  NONREACTIVE Final    Comment:       T. pallidum antibodies are not detected. There is no serological evidence of infection with T. pallidum (early primary syphilis   cannot be excluded). Retest in 2-4 weeks if syphilis is clinically suspect.                Lab Results   Component Value Date    WBC 6.0 07/23/2021    HGB 12.8 07/23/2021    HCT 41.4 07/23/2021    MCV 95.6 07/23/2021     07/23/2021       Lab Results   Component Value Date     04/01/2022    K 4.3 04/01/2022     04/01/2022    CO2 25 04/01/2022    BUN 8 04/01/2022    CREATININE 0.81 04/01/2022    GLUCOSE 89 04/01/2022    GLUCOSE 90 12/16/2011    CALCIUM 9.3 04/01/2022        Lab Results   Component Value Date    ALT 11 04/01/2022    AST 11 04/01/2022    ALKPHOS 65 04/01/2022    BILITOT 0.41 04/01/2022       Lab Results   Component Value Date    TSH 0.94 07/23/2021       Lab Results   Component Value Date    CHOL 221 (H) 04/01/2022    CHOL 227 (H) 02/14/2020    CHOL 194 11/03/2018     Lab Results   Component Value Date    TRIG 207 (H) 04/01/2022    TRIG 262 (H) 02/14/2020    TRIG 156 (H) 11/03/2018     Lab Results   Component Value Date    HDL 51 04/01/2022    HDL 47 07/23/2021    HDL 42 02/14/2020     Lab Results   Component Value Date    LDLCHOLESTEROL 129 04/01/2022    LDLCHOLESTEROL 122 07/23/2021    LDLCHOLESTEROL 133 (H) 02/14/2020     Lab Results   Component Value Date    CHOLHDLRATIO 4.3 04/01/2022    CHOLHDLRATIO 4.5 07/23/2021    CHOLHDLRATIO 5.4 (H) 02/14/2020       Lab Results   Component Value Date    LABA1C 6.0 04/01/2022    LABA1C 5.6 07/23/2021    LABA1C 5.9 11/03/2020       Lab Results   Component Value Date    ODXKGUAN94 370 04/01/2022       Lab Results   Component Value Date    FOLATE 12.3 2022       Lab Results   Component Value Date    VITD25 34.0 2013       Orders Placed This Encounter   Procedures    MMR vaccine subcutaneous    Lipid Panel     Standing Status:   Future     Standing Expiration Date:   2023     Order Specific Question:   Is Patient Fasting?/# of Hours     Answer:   yes, 8-10 hours    TSH     Standing Status:   Future     Standing Expiration Date:   2023    AST     Standing Status:   Future     Standing Expiration Date:   2023    ALT     Standing Status:   Future     Standing Expiration Date:   2023       Orders Placed This Encounter   Medications    montelukast (SINGULAIR) 10 MG tablet     Sig: Take 1 tablet by mouth nightly     Dispense:  30 tablet     Refill:  3    fluticasone (FLONASE) 50 MCG/ACT nasal spray     Si sprays by Nasal route daily     Dispense:  16 g     Refill:  3    SUMAtriptan (IMITREX) 50 MG tablet     Sig: Take 1 tablet by mouth once as needed for Migraine     Dispense:  9 tablet     Refill:  5    ondansetron (ZOFRAN) 4 MG tablet     Sig: Take 1 tablet by mouth every 6 hours as needed for Nausea, Vomiting or Other (headache)     Dispense:  24 tablet     Refill:  0    Semaglutide 3 MG TABS     Sig: Take 3 mg by mouth daily 1st step     Dispense:  30 tablet     Refill:  0       Medications Discontinued During This Encounter   Medication Reason    fluticasone (FLONASE) 50 MCG/ACT nasal spray REORDER    montelukast (SINGULAIR) 4 MG chewable tablet REORDER    metFORMIN (GLUCOPHAGE-XR) 500 MG extended release tablet Patient Choice             On this date 2022 I have spent 35 minutes reviewing previous notes, test results and face to face with the patient discussing the diagnosis and importance of compliance with the treatment plan as well as documenting on the day of the visit.       This note was completed by using the assistance of a speech-recognition program. However, inadvertent computerized transcription errors may be present. Although every effort was made to ensure accuracy, no guarantees can be provided that every mistake has been identified and corrected by editing. An electronic signature was used to authenticate this note.   Electronically signed by Gwen Sauceda MD on 5/9/2022  5:51 PM

## 2022-05-04 NOTE — PROGRESS NOTES
Visit Information    Have you changed or started any medications since your last visit including any over-the-counter medicines, vitamins, or herbal medicines? no   Are you having any side effects from any of your medications? -  no  Have you stopped taking any of your medications? Is so, why? -  no    Have you seen any other physician or provider since your last visit? No  Have you had any other diagnostic tests since your last visit? No  Have you been seen in the emergency room and/or had an admission to a hospital since we last saw you? No  Have you had your routine dental cleaning in the past 6 months? yes     Have you activated your [employer] account? If not, what are your barriers?  Yes     Patient Care Team:  Sarah Bowden MD as PCP - General (Family Medicine)  Sarah Bowden MD as PCP - [address] [place] [address] Provider  MATT Pena CNP as Nurse Practitioner (Certified Nurse Practitioner)  Lindsay Rosado MD as Consulting Physician (Urology)  MATT Barnes CNP (Cardiology)  Av Pate MD as Consulting Physician (Cardiology)  Colin Sy DO as Consulting Physician (Obstetrics & Gynecology)    Medical History Review  Past Medical, Family, and Social History reviewed and does contribute to the patient presenting condition    Health Maintenance   Topic Date Due    Pneumococcal 0-64 years Vaccine (2 - PCV) 03/08/2020    DTaP/Tdap/Td vaccine (1 - Tdap) 07/03/2022 (Originally 2/15/2017)    Depression Monitoring  09/29/2022    A1C test (Diabetic or Prediabetic)  04/01/2023    Lipids  04/01/2023    Cervical cancer screen  07/10/2023    Colorectal Cancer Screen  06/28/2029    Flu vaccine  Completed    COVID-19 Vaccine  Completed    Hepatitis C screen  Completed    HIV screen  Completed    Hepatitis A vaccine  Aged Out    Hepatitis B vaccine  Aged Out    Hib vaccine  Aged Out    Meningococcal (ACWY) vaccine  Aged Out

## 2022-05-04 NOTE — PATIENT INSTRUCTIONS
Patient Education        Prediabetes: Care Instructions  Overview     Prediabetes is a warning sign that you're at risk for getting type 2 diabetes. It means that your blood sugar is higher than it should be. But it's not highenough to be diabetes. The food you eat naturally turns into sugar. Your body uses the sugar for energy. Normally, an organ called the pancreas makes insulin. And insulin allows the sugar in your blood to get into your body's cells. But sometimes the body can't use insulin the right way. So the sugar stays in your blood instead. This is called insulin resistance. The buildup of sugar in your blood means youhave prediabetes. The good news is that you may be able to prevent or delay diabetes. Making small lifestyle changes, like getting active and changing your eating habits, may help you get your blood sugar back to normal. You can work with your doctorto make a treatment plan. Follow-up care is a key part of your treatment and safety. Be sure to make and go to all appointments, and call your doctor if you are having problems. It's also a good idea to know your test results and keep alist of the medicines you take. How can you care for yourself at home?  Watch your weight. A healthy weight helps your body use insulin properly.  Limit the amount of calories, sweets, and unhealthy fat you eat. Ask your doctor if you should see a dietitian. A registered dietitian can help you create meal plans that fit your lifestyle.  Get at least 30 minutes of exercise on most days of the week. Exercise helps control your blood sugar. It also helps you maintain a healthy weight. Walking is a good choice. You also may want to do other activities, such as running, swimming, cycling, or playing tennis or team sports.  Do not smoke. Smoking can make prediabetes worse. If you need help quitting, talk to your doctor about stop-smoking programs and medicines.  These can increase your chances of quitting for good.   If your doctor prescribed medicines, take them exactly as prescribed. Call your doctor if you think you are having a problem with your medicine. You will get more details on the specific medicines your doctor prescribes. When should you call for help? Watch closely for changes in your health, and be sure to contact your doctor if:     You have any symptoms of diabetes. These may include:  ? Being thirsty more often. ? Urinating more. ? Being hungrier. ? Losing weight. ? Being very tired. ? Having blurry vision.      You have a wound that will not heal.      You have an infection that will not go away.      You have problems with your blood pressure.      You want more information about diabetes and how you can keep from getting it. Where can you learn more? Go to https://[employer].I-Works. org and sign in to your Isomark account. Enter I222 in the [employer] box to learn more about \"Prediabetes: Care Instructions. \"     If you do not have an account, please click on the \"Sign Up Now\" link. Current as of: July 28, 2021               Content Version: 13.2  © 2418-8378 [employer]. Care instructions adapted under license by [facility] ([facility]). If you have questions about a medical condition or this instruction, always ask your healthcare professional. [address] any warranty or liability for your use of this information. Patient Education        High Cholesterol: Care Instructions  Overview     Cholesterol is a type of fat in your blood. It is needed for many body functions, such as making new cells. Cholesterol is made by your body. It also comes from food you eat. High cholesterol means that you have too much of thefat in your blood. This raises your risk of a heart attack and stroke. LDL and HDL are part of your total cholesterol. LDL is the \"bad\" cholesterol.  High LDL can raise your risk for coronary artery disease, heart attack, and stroke. HDL is the \"good\" cholesterol. It helps clear bad cholesterol from the body. High HDL is linked with a lower risk of coronary artery disease, heartattack, and stroke. Your cholesterol levels help your doctor find out your risk for having a heart attack or stroke. You and your doctor can talk about whether you need to loweryour risk and what treatment is best for you. Treatment options include a heart-healthy lifestyle and medicine. Both options can help lower your cholesterol and your risk. The way you choose to lower your risk will depend on how high your risk is for heart attack and stroke. It willalso depend on how you feel about taking medicines. Follow-up care is a key part of your treatment and safety. Be sure to make and go to all appointments, and call your doctor if you are having problems. It's also a good idea to know your test results and keep alist of the medicines you take. How can you care for yourself at home?  Eat heart-healthy foods. ? Eat fruits, vegetables, whole grains, beans, and other high-fiber foods. ? Eat lean proteins, such as seafood, lean meats, beans, nuts, and soy products. ? Eat healthy fats, such as canola and olive oil. ? Choose foods that are low in saturated fat. ? Limit sodium and alcohol. ? Limit drinks and foods with added sugar.  Be physically active. Try to do moderate activity at least 2½ hours a week. Or try to do vigorous activity at least 1¼ hours a week. You may want to walk or try other activities, such as running, swimming, cycling, or playing tennis or team sports.  Stay at a healthy weight or lose weight by making the changes in eating and physical activity listed above. Losing just a small amount of weight, even 5 to 10 pounds, can help reduce your risk for having a heart attack or stroke.  Do not smoke.  Manage other health problems. These include diabetes and high blood pressure.  If you think you may have a problem with alcohol or drug use, talk to your doctor.  If you take medicine, take it exactly as prescribed. Call your doctor if you think you are having a problem with your medicine.  Check with your doctor or pharmacist before you use any other medicines, including over-the-counter medicines. Make sure your doctor knows all of the medicines, vitamins, herbal products, and supplements you take. Taking some medicines together can cause problems. When should you call for help? Watch closely for changes in your health, and be sure to contact your doctor if:     You need help making lifestyle changes.      You have questions about your medicine. Where can you learn more? Go to https://Ziipapepiceweb.Affine. org and sign in to your BDA account. Enter N692 in the [employer]ium box to learn more about \"High Cholesterol: Care Instructions. \"     If you do not have an account, please click on the \"Sign Up Now\" link. Current as of: January 10, 2022               Content Version: 13.2  © 2006-2022 [employer]. Care instructions adapted under license by [facility] ([facility]). If you have questions about a medical condition or this instruction, always ask your healthcare professional. [place] [address] any warranty or liability for your use of this information.

## 2022-05-09 ENCOUNTER — TELEPHONE (OUTPATIENT)
Dept: FAMILY MEDICINE CLINIC | Age: 47
End: 2022-05-09

## 2022-05-09 PROBLEM — J45.20 MILD INTERMITTENT ASTHMA WITHOUT COMPLICATION: Status: ACTIVE | Noted: 2022-05-09

## 2022-05-09 PROBLEM — E66.9 OBESITY (BMI 30.0-34.9): Status: ACTIVE | Noted: 2022-05-09

## 2022-05-09 PROBLEM — J30.9 ALLERGIC RHINITIS DUE TO ALLERGEN: Status: ACTIVE | Noted: 2022-05-09

## 2022-05-09 PROBLEM — E66.811 OBESITY (BMI 30.0-34.9): Status: ACTIVE | Noted: 2022-05-09

## 2022-05-09 PROBLEM — G43.009 MIGRAINE WITHOUT AURA AND WITHOUT STATUS MIGRAINOSUS, NOT INTRACTABLE: Status: ACTIVE | Noted: 2022-05-09

## 2022-05-09 ASSESSMENT — ENCOUNTER SYMPTOMS
SINUS PAIN: 0
SINUS PRESSURE: 0

## 2022-05-09 NOTE — TELEPHONE ENCOUNTER
We need correction of the appointments    My message at discharge was: \"Return in about 3 months (around 8/5/2022) for Visit type PHYSICAL, VISION screen, PHQ9. .  Give coupon. PLEASE SUBMIT prior authorization. \"    Last refill for Adipex was in November, it needs to be 7 months from the last refill to qualify for another refill, so first Adipex cannot be earlier than 6/28/2022 11/28/2021 11/23/2021   1  Phentermine 37.5 Mg Tablet         Please cancel appointment for Adipex on 6/2 and 6/23 due to too early  If she changed her mind when she checked out, it is fine but 6/28/22 or after has to be the first Adipex      Future Appointments   Date Time Provider Diamond Gonzalez   6/2/2022  2:30 Gianni Mantilla MD [facility]   6/23/2022  2:30 PM MD [facility]   9/16/2022  9:00 [facility]

## 2022-05-10 NOTE — TELEPHONE ENCOUNTER
Noted  Future Appointments   Date Time Provider Diamond Lisa   7/8/2022 10:30 AM Lotus Gardnering, MD [facility] [place]   8/5/2022 10:30 AM Lotus Gardnering, MD [facility] [place]   9/16/2022  9:00 AM [facility]

## 2022-05-16 RX ORDER — METRONIDAZOLE 500 MG/1
500 TABLET ORAL 2 TIMES DAILY
Qty: 14 TABLET | Refills: 0 | Status: SHIPPED | OUTPATIENT
Start: 2022-05-16 | End: 2022-05-23

## 2022-05-16 NOTE — TELEPHONE ENCOUNTER
Good Morning Celia.  A prescription for Flagyl will be sent to your pharmacy as requested. If symptoms persist please call the office for appointment. Our records indicate you are due for your annual exam after 7/23/22. Please call the office at your earliest convenience to schedule. Thank you.  True Toan  ===View-only below this line===      ----- Message -----       From:Celia Landryjerad Baeza       Sent:5/16/2022 11:33 AM EDT         To:Lottie Shoemaker    Subject:Medicine    Hello. I was wondering if I could get a pill medicine for a BV infection? Symptoms right now -just a little fishy smell. I would like to have before this gets bad. I was in a leodan tube over the weekend and Im pretty sure its due to that .

## 2022-05-18 ENCOUNTER — PATIENT MESSAGE (OUTPATIENT)
Dept: FAMILY MEDICINE CLINIC | Age: 47
End: 2022-05-18

## 2022-05-18 DIAGNOSIS — G43.009 MIGRAINE WITHOUT AURA AND WITHOUT STATUS MIGRAINOSUS, NOT INTRACTABLE: Primary | ICD-10-CM

## 2022-05-18 NOTE — TELEPHONE ENCOUNTER
Patient seen on 5/4/2022 by myself, I did document need for possible MRI brain    \"1. Migraine without aura and without status migrainosus, not intractable  Failing to resolve   Cut down on ibuprofen  Continue magnesium 400 mg and vitamin B2 as preventative  -     SUMAtriptan (IMITREX) 50 MG tablet; Take 1 tablet by mouth once as needed for Migraine, Disp-9 tablet, R-5Normal  -     ondansetron (ZOFRAN) 4 MG tablet; Take 1 tablet by mouth every 6 hours as needed for Nausea, Vomiting or Other (headache), Disp-24 tablet, R-0Normal     Call or return if symptoms persist or fail to improve, if not resolving, we will do an MRI of the brain. I advised Denver Nailer to make appointment with Ophthalmology . The patient verbalizes understanding and agrees with the plan. \"      I placed the orders for migraine headaches, I sent my chart to the patient    I advised her if fever chills neck pain ear pain to go to an urgent care or to do a COVID test at home         Diagnosis Orders   1.  Migraine without aura and without status migrainosus, not intractable  MRI Ravi Burdick MD, Neurology, [facility]        Future Appointments   Date Time Provider Diamond Gonzalez   7/8/2022 10:30 AM Lennox [place], MD [facility]   8/5/2022 10:30 AM Lennox [place], MD [facility]   9/16/2022  9:00 AM Lennox [place], MD [facility]

## 2022-05-18 NOTE — TELEPHONE ENCOUNTER
Patient called office back and she did submit e-visit also she did select any provider she meant to .  Just an [place]

## 2022-05-18 NOTE — TELEPHONE ENCOUNTER
Patient called and I did advise her to go ahead and submit an evisit.  So  will be able to call in any further testing or medications

## 2022-05-24 ENCOUNTER — PATIENT MESSAGE (OUTPATIENT)
Dept: FAMILY MEDICINE CLINIC | Age: 47
End: 2022-05-24

## 2022-05-24 DIAGNOSIS — G43.009 MIGRAINE WITHOUT AURA AND WITHOUT STATUS MIGRAINOSUS, NOT INTRACTABLE: Primary | ICD-10-CM

## 2022-05-24 RX ORDER — MULTIVITAMIN/IRON/FOLIC ACID 18MG-0.4MG
250 TABLET ORAL DAILY
Qty: 90 TABLET | Refills: 3 | Status: SHIPPED | OUTPATIENT
Start: 2022-05-24

## 2022-05-24 RX ORDER — BUTALBITAL, ACETAMINOPHEN AND CAFFEINE 50; 325; 40 MG/1; MG/1; MG/1
1 TABLET ORAL EVERY 6 HOURS PRN
Qty: 30 TABLET | Refills: 0 | Status: SHIPPED | OUTPATIENT
Start: 2022-05-24

## 2022-05-24 RX ORDER — TOPIRAMATE 25 MG/1
25 TABLET ORAL NIGHTLY
Qty: 30 TABLET | Refills: 0 | Status: SHIPPED | OUTPATIENT
Start: 2022-05-24 | End: 2022-07-08 | Stop reason: SDUPTHER

## 2022-05-24 NOTE — TELEPHONE ENCOUNTER
From: Denver Nailer  Sent: 5/24/2022 8:31 AM EDT  To: [address] [facility] Clinical Support Pool  Subject: Medicine     Cont medicine refill request. Maybe prescribe a back up migraine med as wellthe [employer] tech states something about insurance and that could play a part as far as coverage

## 2022-05-27 ENCOUNTER — HOSPITAL ENCOUNTER (OUTPATIENT)
Dept: MRI IMAGING | Age: 47
Discharge: HOME | End: 2022-05-29

## 2022-05-27 DIAGNOSIS — G43.009 MIGRAINE WITHOUT AURA AND WITHOUT STATUS MIGRAINOSUS, NOT INTRACTABLE: ICD-10-CM

## 2022-05-31 ENCOUNTER — HOSPITAL ENCOUNTER (OUTPATIENT)
Dept: MRI IMAGING | Age: 47
Discharge: HOME | End: 2022-06-02
Payer: COMMERCIAL

## 2022-05-31 ENCOUNTER — PATIENT MESSAGE (OUTPATIENT)
Dept: FAMILY MEDICINE CLINIC | Age: 47
End: 2022-05-31

## 2022-05-31 ENCOUNTER — HOSPITAL ENCOUNTER (OUTPATIENT)
Age: 47
Discharge: HOME | End: 2022-05-31
Payer: COMMERCIAL

## 2022-05-31 DIAGNOSIS — E78.5 HYPERLIPIDEMIA WITH TARGET LDL LESS THAN 100: ICD-10-CM

## 2022-05-31 DIAGNOSIS — E66.9 OBESITY (BMI 30.0-34.9): ICD-10-CM

## 2022-05-31 DIAGNOSIS — R73.03 PREDIABETES: ICD-10-CM

## 2022-05-31 DIAGNOSIS — F40.240 CLAUSTROPHOBIA: Primary | ICD-10-CM

## 2022-05-31 LAB
ALT SERPL-CCNC: 15 U/L (ref 5–33)
AST SERPL-CCNC: 13 U/L
CREAT SERPL-MCNC: 0.71 MG/DL (ref 0.5–0.9)
GFR AFRICAN AMERICAN: >60 ML/MIN
GFR NON-AFRICAN AMERICAN: >60 ML/MIN
GFR SERPL CREATININE-BSD FRML MDRD: NORMAL ML/MIN/{1.73_M2}
TSH SERPL DL<=0.05 MIU/L-ACNC: 1.6 UIU/ML (ref 0.3–5)

## 2022-05-31 PROCEDURE — 84443 ASSAY THYROID STIM HORMONE: CPT

## 2022-05-31 PROCEDURE — 36415 COLL VENOUS BLD VENIPUNCTURE: CPT

## 2022-05-31 PROCEDURE — 82565 ASSAY OF CREATININE: CPT

## 2022-05-31 PROCEDURE — 84450 TRANSFERASE (AST) (SGOT): CPT

## 2022-05-31 PROCEDURE — 84460 ALANINE AMINO (ALT) (SGPT): CPT

## 2022-05-31 RX ORDER — BUSPIRONE HYDROCHLORIDE 10 MG/1
10-20 TABLET ORAL 2 TIMES DAILY
Qty: 4 TABLET | Refills: 0 | Status: SHIPPED | OUTPATIENT
Start: 2022-05-31 | End: 2022-06-01

## 2022-05-31 NOTE — RESULT ENCOUNTER NOTE
Tasia comment sent to patient.   Normal kidney function  Future Appointments  7/8/2022   10:30 AM   Yuki Holbrook MD     [facility]  8/5/2022   10:30 AM   Yuki Holbrook MD     [facility]  8/24/2022  2:20 PM    John Jeong MD            Ne[facility]          [facility]  9/16/2022  9:00 AM    Yuki Holbrook MD     [facility]

## 2022-05-31 NOTE — TELEPHONE ENCOUNTER
From: Lexus Zamora  To: Dr. Ortiz Severe: 5/31/2022 2:54 PM EDT  Subject: MRI     Hello. Turns out I am claustrophobic. I didnt go thru with the MRI for now and will reschedule. I just need some medicine to get this done. Madi Highland or something g that you think will help me chill and relax.  Thank you

## 2022-07-08 ENCOUNTER — OFFICE VISIT (OUTPATIENT)
Dept: FAMILY MEDICINE CLINIC | Age: 47
End: 2022-07-08
Payer: COMMERCIAL

## 2022-07-08 DIAGNOSIS — Z23 ENCOUNTER FOR IMMUNIZATION: ICD-10-CM

## 2022-07-08 DIAGNOSIS — E66.9 OBESITY (BMI 30.0-34.9): ICD-10-CM

## 2022-07-08 DIAGNOSIS — G43.009 MIGRAINE WITHOUT AURA AND WITHOUT STATUS MIGRAINOSUS, NOT INTRACTABLE: Primary | ICD-10-CM

## 2022-07-08 DIAGNOSIS — R73.03 PREDIABETES: ICD-10-CM

## 2022-07-08 DIAGNOSIS — E78.5 HYPERLIPIDEMIA WITH TARGET LDL LESS THAN 100: ICD-10-CM

## 2022-07-08 DIAGNOSIS — J45.990 ASTHMA, EXERCISE INDUCED: ICD-10-CM

## 2022-07-08 DIAGNOSIS — M54.2 NECK PAIN: ICD-10-CM

## 2022-07-08 PROCEDURE — 99214 OFFICE O/P EST MOD 30 MIN: CPT | Performed by: FAMILY MEDICINE

## 2022-07-08 RX ORDER — TOPIRAMATE 25 MG/1
25 TABLET ORAL NIGHTLY
Qty: 90 TABLET | Refills: 0 | Status: SHIPPED | OUTPATIENT
Start: 2022-07-08

## 2022-07-08 RX ORDER — PHENTERMINE HYDROCHLORIDE 37.5 MG/1
37.5 TABLET ORAL
Qty: 30 TABLET | Refills: 0 | Status: SHIPPED | OUTPATIENT
Start: 2022-07-08 | End: 2022-08-07

## 2022-07-08 RX ORDER — BACLOFEN 10 MG/1
10 TABLET ORAL NIGHTLY PRN
Qty: 30 TABLET | Refills: 0 | Status: SHIPPED | OUTPATIENT
Start: 2022-07-08

## 2022-07-08 ASSESSMENT — ENCOUNTER SYMPTOMS
CHEST TIGHTNESS: 0
WHEEZING: 0
NAUSEA: 0
ABDOMINAL PAIN: 0
VOMITING: 0
DIARRHEA: 0
ABDOMINAL DISTENTION: 0
CONSTIPATION: 0
COUGH: 0

## 2022-07-08 NOTE — PROGRESS NOTES
Visit Information    Have you changed or started any medications since your last visit including any over-the-counter medicines, vitamins, or herbal medicines? no   Are you having any side effects from any of your medications? -  no  Have you stopped taking any of your medications? Is so, why? -  no    Have you seen any other physician or provider since your last visit? No  Have you had any other diagnostic tests since your last visit? No  Have you been seen in the emergency room and/or had an admission to a hospital since we last saw you? No  Have you had your routine dental cleaning in the past 6 months? yes     Have you activated your [employer] account? If not, what are your barriers?  Yes     Patient Care Team:  Savanah Winters MD as PCP - General (Family Medicine)  Savanah Winters MD as PCP - [facility]  MATT Mayo CNP as Nurse Practitioner (Certified Nurse Practitioner)  Fuentes Fajardo MD as Consulting Physician (Urology)  MATT Sin CNP (Cardiology)  Cynthia Bailey MD as Consulting Physician (Cardiology)  Makeda Archibald DO as Consulting Physician (Obstetrics & Gynecology)    Medical History Review  Past Medical, Family, and Social History reviewed and does contribute to the patient presenting condition    Health Maintenance   Topic Date Due    DTaP/Tdap/Td vaccine (1 - Tdap) 02/15/2017    Pneumococcal 0-64 years Vaccine (2 - PCV) 03/08/2020    Flu vaccine (1) 09/01/2022    A1C test (Diabetic or Prediabetic)  04/01/2023    Lipids  04/01/2023    Depression Monitoring  05/04/2023    Cervical cancer screen  07/10/2023    Colorectal Cancer Screen  06/28/2029    COVID-19 Vaccine  Completed    Hepatitis C screen  Completed    HIV screen  Completed    Hepatitis A vaccine  Aged Out    Hepatitis B vaccine  Aged Out    Hib vaccine  Aged Out    Meningococcal (ACWY) vaccine  Aged Out

## 2022-07-08 NOTE — PROGRESS NOTES
Osbaldo Roland (:  1975) is a 49 y.o. female,Established patient, here for evaluation of the following chief complaint(s): Weight Management (adipex 1 ), Asthma, Hyperglycemia, Neck Pain, Hyperlipidemia, and Headache      ASSESSMENT/PLAN:    1. Migraine without aura and without status migrainosus, not intractable  Worsening  -Compliance with daily taking    topiramate (TOPAMAX) 25 MG tablet; Take 1 tablet by mouth nightly For headaches prevention. Drink plenty of fluids 64 oz/day, Disp-90 tablet, R-0Normal  Continue magnesium 250 Mg, riboflavin, as preventative  Fioricet as abortive, Imitrex as abortive  Headache diary discussed  Follow-up with neurologist  Pending MRI brain, patient says she has to schedule it, order was already placed    2. Obesity (BMI 30.0-34. 9)  Worsening  -   To start phentermine (ADIPEX-P) 37.5 MG tablet; Take 1 tablet by mouth every morning (before breakfast) for 30 days. , Disp-30 tablet, R-0Normal  Patient was asked about her current diet and exercise habits, and personalized advice was provided regarding recommended lifestyle changes. Patient's comorbid health conditions associated with elevated BMI were discussed, including asthma, dyslipidemia and hyperglycemia, mood disorder, as well as the likely benefits of weight loss. Based upon patient's motivation to change her behavior, the following plan was agreed upon to work toward a weight loss goal of 1-2 pounds per week: low carbohydrate diet, wear a pedometer and get at least 10,000 steps per day and medication prescribed: Adipex, Topamax, semaglutide. Educational materials for  weight loss were provided. Patient will follow-up in 1 month(s) with PCP. Provider spent 15 minutes counseling patient. 3. Asthma, exercise induced  Improved with medications  She does use albuterol 20 to 30 minutes prior to exercising    Continue singular 10 Mg daily, Flovent 2 puffs twice a day, rinse mouth after use,  4. Prediabetes  Worsening  Compliance with semaglutide discussed, she does have it at home, restart taking it  Low carb, low fat diet, increase fruits and vegetables, and exercise 4-5 times a week 30-40 minutes a day, or walk 1-2 hours per day, or wear a pedometer and get at least 10,000 steps per day. Lab Results   Component Value Date    LABA1C 6.0 04/01/2022    LABA1C 5.6 07/23/2021    LABA1C 5.9 11/03/2020     Will check A1c next visit    5. Hyperlipidemia with target LDL less than 100  Inadequately controlled  Continue pravastatin 40 Mg daily, compliance  discussed  Lab Results   Component Value Date    LDLCHOLESTEROL 129 04/01/2022     Low carb, low fat diet, increase fruits and vegetables, and exercise 4-5 times a week 30-40 minutes a day, or walk 1-2 hours per day, or wear a pedometer and get at least 10,000 steps per day. 6. Neck pain  Failing to resolve  Bengay ultra strength from over-the-counter, stretching, repositioning, heating pad  -     baclofen (LIORESAL) 10 MG tablet; Take 1 tablet by mouth nightly as needed (MUSCLE SPASMS) Causes sedation, do not drive while taking this medication, Disp-30 tablet, R-0Normal  7. Encounter for immunization  -     pneumococcal 20-valent conjugat (PREVNAR) 0.5 ML NIDIA inj; Inject 0.5 mLs into the muscle once for 1 dose, Disp-0.5 mL, R-0Normal      Crystal received counseling on the following healthy behaviors: nutrition, exercise, medication adherence and weight loss. Reviewed prior labs and health maintenance  Discussed use, benefit, and side effects of prescribed medications. Barriers to medication compliance addressed. Patient given educational materials - see patient instructions  All patient questions answered. Patient voiced understanding. The patient's past medical,surgical, social, and family history as well as her current medications and allergies were reviewed as documented in today's encounter.     Medications, labs, diagnostic studies, consultations and follow-up as documented in this encounter. Return in about 8 weeks (around 9/2/2022) for Adipex #3 virtual OK. Cancel 9/16/22    Future Appointments   Date Time Provider Diamond Gonzalez   8/5/2022 10:30 AM Saul Ha MD [facility]   8/23/2022  2:00 PM Saul Ha MD [facility]   8/24/2022  2:20 PM Jose Vincent MD [facility]         SUBJECTIVE/OBJECTIVE:    HPI    Patient reports worsening migraines. She says she has had daily headaches for 2 weeks, could not exercise, mostly laid in bed. Patient reports her headaches are usually located frontal and occipital and into the neck. Her migraine headaches are associated with photophobia and phonophobia. When severe, she also gets nausea. We referred her to the neurologist, she has made appointment. I also ordered MRI brain for her, but she did not schedule it, due to claustrophobia. Patient says her headaches are better now   Getting severe headaches about 1-2 times a week  She admits that she has started to take topamax again but every other day. Compliance with Topamax as preventative, and to take it daily, discussed. She also admits she does not take magnesium riboflavin daily because she forgets, and she does not want to take too many pills. Patient reports also neck pain,headache, eye pain and allergies flaring up for which she has been taking flonase and Zyrtec. She says for neck pain she has been taking biofreeeze which helps a little but still having neck pain, intensity pain 2 out of 10 today but it was worse before. She feels the neck pain also triggers her migraine headaches. She denies any injury. Wants to lose weight.     Jeffery Rivera has made a substantial good marie effort to lose weight in a regimen for weight reduction based on caloric restriction, nutritional changes, and exercise  Crystal Gay Singleton reports she did: Low-carb diet, exercise and, eating smaller portions    Contraindications to controlled substance anorexiant: NONE     Celia Quinteros reports no  use of illegal drug substances  Celia Negron reports alcohol use of : None    Contraceptive method: Tubal ligation    Patient informed that when prescribed a controlled substance for weight loss, the provider is required by law to see the patient for an appointment every thirty days. This is neccessary to record the weight and blood pressure and to assess patient's efforts to lose weight, and to ensure there are no contraindications or adverse effects. Patient advised contraception during the time of taking this medication, advised no alcohol use during this time    OARRS done today and okay  Plan: diet, exercise and start Adipex, compliance with Adipex, Topamax, and semaglutide discussed    blood pressure is Normal.    BP Readings from Last 3 Encounters:   07/08/22 110/60   05/04/22 130/80   12/28/21 134/88        Pulse is Normal.  Pulse Readings from Last 3 Encounters:   07/08/22 79   05/04/22 74   08/05/21 74     Apparently her weight did go up by 2 pounds per our scale, since the last appointment, she says she is premenstrual, has been having bilateral bloating , and has been retaining water. She also reports having a lot of cramps right now. Wt Readings from Last 3 Encounters:   07/08/22 185 lb (83.9 kg)   05/04/22 182 lb (82.6 kg)   12/28/21 183 lb (83 kg)       Obesity per BMI  Body mass index is 34.96 kg/m².       Exercise Tracking - Detailed 7/8/2022   Walking Duration 45   Walking Intensity Moderate   Walking Times/Week 3   Biking Duration -   Biking Times/Week -   Running Duration -   Running Times/Week -   Swimming Duration -   Swimming Times/Week -   Cardiovascular Equipment Duration -   Cardiovascular Equipment Intensity -   Cardiovascular Equipment Times/Week -   Exercise Classes / Videos Duration -   Exercise Classes / Videos Intensity -   Exercise Classes / Videos Times/Week -   Strength Training Duration -   Strength Training Times/Week -   Other Exercise Comment -   Other Duration -   Other Intensity -   Other Times/Week -   Pedometer Steps/Day -   Total Exercise Minutes/Week 135     Prediabetes worsening  She is on semaglutide, however she says she has not been taking it for more than a month, she does have it at home, has not been checking home blood glucose, has been trying to do intensive lifestyle changes. Denies increased appetite, thirst or polyuria. Metformin gave her diarrhea in the past.  Lab Results   Component Value Date    LABA1C 6.0 04/01/2022    LABA1C 5.6 07/23/2021    LABA1C 5.9 11/03/2020       Hyperlipidemia:  No new myalgias or GI upset on pravastatin (Pravachol). Medication compliance: compliant most of the time, forgets to take it, discussed to take all the pills at once, in the evening. Patient is  following a low fat, low cholesterol diet. LDL is high, on high triglycerides  Lab Results   Component Value Date    LDLCHOLESTEROL 129 04/01/2022     Lab Results   Component Value Date    TRIG 207 (H) 04/01/2022    TRIG 262 (H) 02/14/2020    TRIG 156 (H) 11/03/2018         Asthma:  Current treatment includes beta agonist inhalers, inhaled steroids, leukotriene antagonists, which has been effective. Using preventive medication(s) consistently: yes. Residual symptoms: dyspnea and wheezing. Patient reports mild wheezing a few days ago, when the weather changed, resolved now. Patient denies chest pain/tightness, cough. She requires her rescue inhaler 3 time(s) per week before exercising . Does not smoke    Counseling given: Yes                Prior to Visit Medications    Medication Sig Taking?  Authorizing Provider   topiramate (TOPAMAX) 25 MG tablet Take 1 tablet by mouth nightly For headaches prevention Yes Vikas Black MD   Magnesium Oxide (MAGNESIUM-OXIDE) 250 MG TABS tablet Take 1 tablet by mouth daily Take with food, in the evening, preventative for headaches Yes Sherry Zendejas MD   Riboflavin 100 MG TABS Take 100 mg by mouth daily Preventative for headaches Yes Sherry Zendejas MD   butalbital-acetaminophen-caffeine (FIORICET, ESGIC) -40 MG per tablet Take 1 tablet by mouth every 6 hours as needed for Headaches or Migraine MAX 3 DAYS/WEEK Yes Sherry Zendejas MD   montelukast (SINGULAIR) 10 MG tablet Take 1 tablet by mouth nightly Yes Sherry Zendejas MD   fluticasone (FLONASE) 50 MCG/ACT nasal spray 2 sprays by Nasal route daily Yes Sherry Zendejas MD   ondansetron (ZOFRAN) 4 MG tablet Take 1 tablet by mouth every 6 hours as needed for Nausea, Vomiting or Other (headache) Yes Sherry Zendejas MD   Semaglutide 3 MG TABS Take 3 mg by mouth daily 1st step Yes Sherry Zendejas MD   pravastatin (PRAVACHOL) 40 MG tablet Take 1 tablet by mouth every evening With food, for high cholesterol Yes Sherry Zendejas MD   fluticasone (FLOVENT HFA) 44 MCG/ACT inhaler Inhale 2 puffs into the lungs 2 times daily Yes Sherry Zendejas MD   albuterol sulfate HFA (PROAIR HFA) 108 (90 Base) MCG/ACT inhaler Inhale 2 puffs into the lungs every 6 hours as needed for Wheezing or Shortness of Breath (cough) Yes Sherry Zendejas MD   cyanocobalamin ([employer] VITAMIN B12) 1000 MCG tablet Take 1 tablet by mouth daily Yes Sherry Zendejas MD   Magnesium 400 MG CAPS Take by mouth Yes Mayra Aiken MD   SUMAtriptan (IMITREX) 50 MG tablet Take 1 tablet by mouth once as needed for Migraine  Sherry Zendejas MD       Social History     Tobacco Use    Smoking status: Never Smoker    Smokeless tobacco: Never Used   Vaping Use    Vaping Use: Never used   Substance Use Topics    Alcohol use: Yes     Alcohol/week: 0.0 standard drinks     Comment: Socially    Drug use: No         Review of Systems   Constitutional: Positive for unexpected weight change. Negative for activity change, appetite change, chills, diaphoresis, fatigue and fever. Respiratory: Positive for shortness of breath (PURVIS). Negative for cough, chest tightness and wheezing. Cardiovascular: Negative for chest pain, palpitations and leg swelling. Gastrointestinal: Negative for abdominal distention, abdominal pain, constipation, diarrhea, nausea and vomiting. Endocrine: Negative for cold intolerance, heat intolerance, polydipsia, polyphagia and polyuria. Genitourinary: Positive for menstrual problem (dysmenorrhea). Musculoskeletal: Positive for neck pain. Allergic/Immunologic: Positive for environmental allergies. Neurological: Positive for headaches.           -vital signs stable and within normal limits except obesity per BMI  /60   Pulse 79   Temp 97.9 °F (36.6 °C)   Ht 5' 1\" (1.549 m)   Wt 185 lb (83.9 kg)   SpO2 98%   BMI 34.96 kg/m²      Physical Exam  Vitals and nursing note reviewed. Constitutional:       General: She is not in acute distress. Appearance: Normal appearance. She is well-developed. She is obese. She is not diaphoretic. HENT:      Head: Normocephalic and atraumatic. Right Ear: External ear normal.      Left Ear: External ear normal.      Mouth/Throat:      Comments: I did not examine the mouth due to coronavirus pandemic and wearing masks    Eyes:      General: Lids are normal. No scleral icterus. Right eye: No discharge. Left eye: No discharge. Extraocular Movements: Extraocular movements intact. Conjunctiva/sclera: Conjunctivae normal.   Neck:      Thyroid: No thyromegaly. Cardiovascular:      Rate and Rhythm: Normal rate and regular rhythm. Heart sounds: Normal heart sounds. No murmur heard. Pulmonary:      Effort: Pulmonary effort is normal. No respiratory distress. Breath sounds: Normal breath sounds. No wheezing or rales. Chest:      Chest wall: No tenderness. Abdominal:      General: Bowel sounds are normal. There is no distension. Palpations: Abdomen is soft.  There is no hepatomegaly or splenomegaly. Tenderness: There is no abdominal tenderness. Comments: Obese abdomen. Musculoskeletal:         General: Normal range of motion. Cervical back: Normal range of motion and neck supple. Spasms and tenderness present. No rigidity. Normal range of motion. Right lower leg: No edema. Left lower leg: No edema. Skin:     General: Skin is warm and dry. Capillary Refill: Capillary refill takes less than 2 seconds. Findings: No rash. Neurological:      Mental Status: She is alert and oriented to person, place, and time. Cranial Nerves: No cranial nerve deficit. Motor: No abnormal muscle tone. Psychiatric:         Mood and Affect: Mood normal.         Behavior: Behavior normal.         Thought Content: Thought content normal.         Judgment: Judgment normal.             I personally reviewed testing . Discussed testing with the patient and all questions fully answered. Hyperglycemia  Hyperlipidemia  Prediabetes  Mildly low vitamin B12    Otherwise labs within normal limits    Hospital Outpatient Visit on 05/31/2022   Component Date Value Ref Range Status    CREATININE 05/31/2022 0.71  0.50 - 0.90 mg/dL Final    GFR Non- 05/31/2022 >60  >60 mL/min Final    GFR  05/31/2022 >60  >60 mL/min Final    GFR Comment 05/31/2022        Final    Comment: Average GFR for 38-51 years old:   80 mL/min/1.73sq m  Chronic Kidney Disease:   <60 mL/min/1.73sq m  Kidney failure:   <15 mL/min/1.73sq m              eGFR calculated using average adult body mass.  Additional eGFR calculator available at:        [employer]               Lab Results   Component Value Date    WBC 6.0 07/23/2021    HGB 12.8 07/23/2021    HCT 41.4 07/23/2021    MCV 95.6 07/23/2021     07/23/2021       Lab Results   Component Value Date/Time     04/01/2022 09:54 AM    K 4.3 04/01/2022 09:54 AM    CL 101 04/01/2022 09:54 AM    CO2 25 04/01/2022 09:54 AM    BUN 8 04/01/2022 09:54 AM    CREATININE 0.71 05/31/2022 02:07 PM    GLUCOSE 89 04/01/2022 09:54 AM    GLUCOSE 90 12/16/2011 03:47 PM    CALCIUM 9.3 04/01/2022 09:54 AM        Lab Results   Component Value Date    ALT 15 05/31/2022    AST 13 05/31/2022    ALKPHOS 65 04/01/2022    BILITOT 0.41 04/01/2022       Lab Results   Component Value Date    TSH 1.60 05/31/2022       Lab Results   Component Value Date    CHOL 221 (H) 04/01/2022    CHOL 227 (H) 02/14/2020    CHOL 194 11/03/2018     Lab Results   Component Value Date    TRIG 207 (H) 04/01/2022    TRIG 262 (H) 02/14/2020    TRIG 156 (H) 11/03/2018     Lab Results   Component Value Date    HDL 51 04/01/2022    HDL 47 07/23/2021    HDL 42 02/14/2020     Lab Results   Component Value Date    LDLCHOLESTEROL 129 04/01/2022    LDLCHOLESTEROL 122 07/23/2021    LDLCHOLESTEROL 133 (H) 02/14/2020     Lab Results   Component Value Date    CHOLHDLRATIO 4.3 04/01/2022    CHOLHDLRATIO 4.5 07/23/2021    CHOLHDLRATIO 5.4 (H) 02/14/2020       Lab Results   Component Value Date    LABA1C 6.0 04/01/2022    LABA1C 5.6 07/23/2021    LABA1C 5.9 11/03/2020       Lab Results   Component Value Date    VETASYSC11 370 04/01/2022       Lab Results   Component Value Date    FOLATE 12.3 04/01/2022       Lab Results   Component Value Date    VITD25 34.0 04/06/2013       No orders of the defined types were placed in this encounter. Orders Placed This Encounter   Medications    pneumococcal 20-valent conjugat (PREVNAR) 0.5 ML NIDIA inj     Sig: Inject 0.5 mLs into the muscle once for 1 dose     Dispense:  0.5 mL     Refill:  0    topiramate (TOPAMAX) 25 MG tablet     Sig: Take 1 tablet by mouth nightly For headaches prevention. Drink plenty of fluids 64 oz/day     Dispense:  90 tablet     Refill:  0    phentermine (ADIPEX-P) 37.5 MG tablet     Sig: Take 1 tablet by mouth every morning (before breakfast) for 30 days.      Dispense:  30 tablet     Refill:  0    baclofen (LIORESAL) 10 MG tablet     Sig: Take 1 tablet by mouth nightly as needed (MUSCLE SPASMS) Causes sedation, do not drive while taking this medication     Dispense:  30 tablet     Refill:  0       Medications Discontinued During This Encounter   Medication Reason    Magnesium 400 MG CAPS Therapy completed    topiramate (TOPAMAX) 25 MG tablet REORDER             On this date 7/8/2022 I have spent  35 minutes reviewing previous notes, test results and face to face with the patient discussing the diagnosis and importance of compliance with the treatment plan as well as documenting on the day of the visit. This note was completed by using the assistance of a speech-recognition program. However, inadvertent computerized transcription errors may be present. Although every effort was made to ensure accuracy, no guarantees can be provided that every mistake has been identified and corrected by editing. An electronic signature was used to authenticate this note.   Electronically signed by Governor Nery MD on 7/10/2022  10:43 AM

## 2022-07-10 VITALS
BODY MASS INDEX: 34.93 KG/M2 | WEIGHT: 185 LBS | TEMPERATURE: 97.9 F | SYSTOLIC BLOOD PRESSURE: 110 MMHG | OXYGEN SATURATION: 98 % | DIASTOLIC BLOOD PRESSURE: 60 MMHG | HEART RATE: 79 BPM | HEIGHT: 61 IN

## 2022-07-10 ASSESSMENT — ENCOUNTER SYMPTOMS: SHORTNESS OF BREATH: 1

## 2022-07-10 NOTE — PATIENT INSTRUCTIONS
good.   If your doctor prescribed medicines, take them exactly as prescribed. Call your doctor if you think you are having a problem with your medicine. You will get more details on the specific medicines your doctor prescribes. When should you call for help? Watch closely for changes in your health, and be sure to contact your doctor if:     You have any symptoms of diabetes. These may include:  ? Being thirsty more often. ? Urinating more. ? Being hungrier. ? Losing weight. ? Being very tired. ? Having blurry vision.      You have a wound that will not heal.      You have an infection that will not go away.      You have problems with your blood pressure.      You want more information about diabetes and how you can keep from getting it. Where can you learn more? Go to [email or web address] and sign in to your VoiceObjects account. Enter I222 in the [employer] box to learn more about \"Prediabetes: Care Instructions. \"     If you do not have an account, please click on the \"Sign Up Now\" link. Current as of: July 28, 2021               Content Version: 13.3  © 8767-5865 [employer]. Care instructions adapted under license by [facility] ([facility]). If you have questions about a medical condition or this instruction, always ask your healthcare professional. [address] any warranty or liability for your use of this information. Patient Education        DASH Diet: Care Instructions  Your Care Instructions     The DASH diet is an eating plan that can help lower your blood pressure. DASH stands for Dietary Approaches to Stop Hypertension. Hypertension is high bloodpressure. The DASH diet focuses on eating foods that are high in calcium, potassium, and magnesium. These nutrients can lower blood pressure. The foods that are highest in these nutrients are fruits, vegetables, low-fat dairy products, nuts, seeds, and legumes.  But taking calcium, potassium, and magnesium supplements instead of eating foods that are high in those nutrients does not have the same effect. The DASH diet also includes whole grains, fish, and poultry. The DASH diet is one of several lifestyle changes your doctor may recommend to lower your high blood pressure. Your doctor may also want you to decrease the amount of sodium in your diet. Lowering sodium while following the DASH dietcan lower blood pressure even further than just the DASH diet alone. Follow-up care is a key part of your treatment and safety. Be sure to make and go to all appointments, and call your doctor if you are having problems. It's also a good idea to know your test results and keep alist of the medicines you take. How can you care for yourself at home? Following the DASH diet   Eat 4 to 5 servings of fruit each day. A serving is 1 medium-sized piece of fruit, ½ cup chopped or canned fruit, 1/4 cup dried fruit, or 4 ounces (½ cup) of fruit juice. Choose fruit more often than fruit juice.  Eat 4 to 5 servings of vegetables each day. A serving is 1 cup of lettuce or raw leafy vegetables, ½ cup of chopped or cooked vegetables, or 4 ounces (½ cup) of vegetable juice. Choose vegetables more often than vegetable juice.  Get 2 to 3 servings of low-fat and fat-free dairy each day. A serving is 8 ounces of milk, 1 cup of yogurt, or 1 ½ ounces of cheese.  Eat 6 to 8 servings of grains each day. A serving is 1 slice of bread, 1 ounce of dry cereal, or ½ cup of cooked rice, pasta, or cooked cereal. Try to choose whole-grain products as much as possible.  Limit lean meat, poultry, and fish to 2 servings each day. A serving is 3 ounces, about the size of a deck of cards.  Eat 4 to 5 servings of nuts, seeds, and legumes (cooked dried beans, lentils, and split peas) each week. A serving is 1/3 cup of nuts, 2 tablespoons of seeds, or ½ cup of cooked beans or peas.    Limit fats and oils to 2 to 3 servings each day. A serving is 1 teaspoon of vegetable oil or 2 tablespoons of salad dressing.  Limit sweets and added sugars to 5 servings or less a week. A serving is 1 tablespoon jelly or jam, ½ cup sorbet, or 1 cup of lemonade.  Eat less than 2,300 milligrams (mg) of sodium a day. If you limit your sodium to 1,500 mg a day, you can lower your blood pressure even more.  Be aware that all of these are the suggested number of servings for people who eat 1,800 to 2,000 calories a day. Your recommended number of servings may be different if you need more or fewer calories. Tips for success   Start small. Do not try to make dramatic changes to your diet all at once. You might feel that you are missing out on your favorite foods and then be more likely to not follow the plan. Make small changes, and stick with them. Once those changes become habit, add a few more changes.  Try some of the following:  ? Make it a goal to eat a fruit or vegetable at every meal and at snacks. This will make it easy to get the recommended amount of fruits and vegetables each day. ? Try yogurt topped with fruit and nuts for a snack or healthy dessert. ? Add lettuce, tomato, cucumber, and onion to sandwiches. ? Combine a ready-made pizza crust with low-fat mozzarella cheese and lots of vegetable toppings. Try using tomatoes, squash, spinach, broccoli, carrots, cauliflower, and onions. ? Have a variety of cut-up vegetables with a low-fat dip as an appetizer instead of chips and dip. ? Sprinkle sunflower seeds or chopped almonds over salads. Or try adding chopped walnuts or almonds to cooked vegetables. ? Try some vegetarian meals using beans and peas. Add garbanzo or kidney beans to salads. Make burritos and tacos with mashed weeks beans or black beans. Where can you learn more? Go to https://genia.[employer]. org and sign in to your [employer]feelgood[employer] account.  Enter K599 in the [employer] box to learn more about \"DASH Diet: Care Instructions. \"     If you do not have an account, please click on the \"Sign Up Now\" link. Current as of: January 10, 2022               Content Version: 13.3  © 1012-0579 [employer]. Care instructions adapted under license by [facility] ([facility]). If you have questions about a medical condition or this instruction, always ask your healthcare professional. [address] any warranty or liability for your use of this information.

## 2022-08-10 ENCOUNTER — TELEPHONE (OUTPATIENT)
Dept: OBGYN CLINIC | Age: 47
End: 2022-08-10

## 2022-08-11 ENCOUNTER — OFFICE VISIT (OUTPATIENT)
Dept: OBGYN CLINIC | Age: 47
End: 2022-08-11
Payer: COMMERCIAL

## 2022-08-11 ENCOUNTER — HOSPITAL ENCOUNTER (OUTPATIENT)
Age: 47
Setting detail: SPECIMEN
Discharge: HOME | End: 2022-08-11

## 2022-08-11 VITALS
DIASTOLIC BLOOD PRESSURE: 74 MMHG | SYSTOLIC BLOOD PRESSURE: 116 MMHG | WEIGHT: 184 LBS | BODY MASS INDEX: 34.74 KG/M2 | HEIGHT: 61 IN

## 2022-08-11 DIAGNOSIS — N89.8 VAGINAL DISCHARGE: ICD-10-CM

## 2022-08-11 DIAGNOSIS — N89.8 VAGINAL DISCHARGE: Primary | ICD-10-CM

## 2022-08-11 PROCEDURE — 99213 OFFICE O/P EST LOW 20 MIN: CPT | Performed by: NURSE PRACTITIONER

## 2022-08-11 NOTE — PROGRESS NOTES
Celia Cordero  2022    YOB: 1975          The patient was seen today. She is here regarding vaginal discharge . Her bowels are regular and she is voiding without difficulty. HPI:  Mary Soto is a 49 y.o. female [id number] vaginal discharge      OB History    Para Term  AB Living   5 4 4 0 1 4   SAB IAB Ectopic Molar Multiple Live Births   0 1 0 0 0 4      # Outcome Date GA Lbr Robert/2nd Weight Sex Delivery Anes PTL Lv   5 IAB        N    4 Term      Vag-Spont  N MONTY   3 Term      Vag-Spont  N MONTY   2 Term      Vag-Spont  N MONTY   1 Term      Vag-Spont  N MONTY       Past Medical History:   Diagnosis Date    Allergic rhinitis due to allergen 2022    Anxiety 2019    Asthma     Current mild episode of major depressive disorder without prior episode ([facility] [address] 2020    Dyslipidemia (high LDL; low HDL) 2017    History of endometrial ablation     History of kidney stones 2017    History of tubal ligation     Hyperglycemia 2017    Hypertriglyceridemia 2017    Hypomagnesemia 2020    Migraine without aura and without status migrainosus, not intractable 2022    Obesity (BMI 30-39. 9) 2017    Pre-syncope 2020    Vision abnormalities     glasses/contacts       Past Surgical History:   Procedure Laterality Date    APPENDECTOMY  2021    [facility]    COLONOSCOPY      COLONOSCOPY  2019    COLONOSCOPY N/A 2019    COLORECTAL CANCER SCREENING, NOT HIGH RISK performed by Dai Landa MD at [facility]      LITHOTRIPSY      TUBAL LIGATION      TUBAL LIGATION      WISDOM TOOTH EXTRACTION         Family History   Problem Relation Age of Onset    Diabetes Father         possible type 1    Heart Attack Father 35    Bipolar Disorder Sister     Lung Cancer Paternal Grandmother     Heart Attack Paternal Grandfather     Colon Cancer Neg Hx     Breast Cancer Neg Hx        Social History Socioeconomic History    Marital status: Single     Spouse name: Not on file    Number of children: Not on file    Years of education: Not on file    Highest education level: Not on file   Occupational History    Not on file   Tobacco Use    Smoking status: Never    Smokeless tobacco: Never   Vaping Use    Vaping Use: Never used   Substance and Sexual Activity    Alcohol use: Yes     Alcohol/week: 0.0 standard drinks     Comment: Socially    Drug use: No    Sexual activity: Yes     Partners: Male     Birth control/protection: Surgical     Comment: TL   Other Topics Concern    Not on file   Social History Narrative    Not on file     Social Determinants of Health     Financial Resource Strain: Not on file   Food Insecurity: Not on file   Transportation Needs: Not on file   Physical Activity: Not on file   Stress: Not on file   Social Connections: Not on file   Intimate Partner Violence: Not on file   Housing Stability: Not on file         MEDICATIONS:  Current Outpatient Medications   Medication Sig Dispense Refill    topiramate (TOPAMAX) 25 MG tablet Take 1 tablet by mouth nightly For headaches prevention.  Drink plenty of fluids 64 oz/day 90 tablet 0    baclofen (LIORESAL) 10 MG tablet Take 1 tablet by mouth nightly as needed (MUSCLE SPASMS) Causes sedation, do not drive while taking this medication 30 tablet 0    Magnesium Oxide (MAGNESIUM-OXIDE) 250 MG TABS tablet Take 1 tablet by mouth daily Take with food, in the evening, preventative for headaches 90 tablet 3    Riboflavin 100 MG TABS Take 100 mg by mouth daily Preventative for headaches 90 tablet 3    butalbital-acetaminophen-caffeine (FIORICET, ESGIC) -40 MG per tablet Take 1 tablet by mouth every 6 hours as needed for Headaches or Migraine MAX 3 DAYS/WEEK 30 tablet 0    montelukast (SINGULAIR) 10 MG tablet Take 1 tablet by mouth nightly 30 tablet 3    fluticasone (FLONASE) 50 MCG/ACT nasal spray 2 sprays by Nasal route daily 16 g 3    SUMAtriptan (IMITREX) 50 MG tablet Take 1 tablet by mouth once as needed for Migraine 9 tablet 5    ondansetron (ZOFRAN) 4 MG tablet Take 1 tablet by mouth every 6 hours as needed for Nausea, Vomiting or Other (headache) 24 tablet 0    Semaglutide 3 MG TABS Take 3 mg by mouth daily 1st step 30 tablet 0    pravastatin (PRAVACHOL) 40 MG tablet Take 1 tablet by mouth every evening With food, for high cholesterol 90 tablet 3    fluticasone (FLOVENT HFA) 44 MCG/ACT inhaler Inhale 2 puffs into the lungs 2 times daily 10.6 g 3    albuterol sulfate HFA (PROAIR HFA) 108 (90 Base) MCG/ACT inhaler Inhale 2 puffs into the lungs every 6 hours as needed for Wheezing or Shortness of Breath (cough) 8 g 3    cyanocobalamin ([employer] VITAMIN B12) 1000 MCG tablet Take 1 tablet by mouth daily 30 tablet 3     No current facility-administered medications for this visit. ALLERGIES:  Allergies as of 08/11/2022    (No Known Allergies)         REVIEW OF SYSTEMS:    see problem list   A minimum of an eleven point review of systems was completed. Review Of Systems (11 point):  Constitutional: No fever, chills or malaise; No weight change or fatigue  Head and Eyes: No vision, Headache, Dizziness or trauma in last 12 months  ENT ROS: No hearing, Tinnitis, sinus or taste problems  Hematological and Lymphatic ROS:No Lymphoma, Von Willebrand's, Hemophillia or Bleeding History  Psych ROS: No Depression, Homicidal thoughts,suicidal thoughts, or anxiety  Breast ROS: No prior breast abnormalities or lumps  Respiratory ROS: No SOB, Pneumoniae,Cough, or Pulmonary Embolism History  Cardiovascular ROS: No Chest Pain with Exertion, Palpitations, Syncope, Edema, Arrhythmia  Gastrointestinal ROS: No Indigestion, Heartburn, Nausea, vomiting, Diarrhea, Constipation,or Bowel Changes; No Bloody Stools or melena  Genito-Urinary ROS: No Dysuria, Hematuria or Nocturia.  No Urinary Incontinence or Vaginal Discharge  Musculoskeletal ROS: No Arthralgia, Arthritis,Gout,Osteoporosis or Rheumatism  Neurological ROS: No CVA, Migraines, Epilepsy, Seizure Hx, or Limb Weakness  Dermatological ROS: No Rash, Itching, Hives, Mole Changes or Cancer          Blood pressure 116/74, height 5' 1\" (1.549 m), weight 184 lb (83.5 kg), not currently breastfeeding. Chaperone for Intimate Exam  Chaperone was offered and accepted as part of the rooming process. Chaperone: Ruddy Ghotra MA         Abdomen: Soft non-tender; good bowel sounds. No guarding, rebound or rigidity. No CVA tenderness bilaterally. Extremities: No calf tenderness, DTR 2/4, and No edema bilaterally    Pelvic: Vulva and vagina appear normal. Bimanual exam reveals normal uterus and adnexa. Diagnostics:  No results found. Lab Results:  Results for orders placed or performed during the hospital encounter of 05/31/22   Creatinine   Result Value Ref Range    Creatinine 0.71 0.50 - 0.90 mg/dL    GFR Non-African American >60 >60 mL/min    GFR African American >60 >60 mL/min    GFR Comment               Assessment:   Diagnosis Orders   1.  Vaginal discharge  C.trachomatis N.gonorrhoeae DNA    Vaginitis DNA Probe        Patient Active Problem List    Diagnosis Date Noted    Migraine without aura and without status migrainosus, not intractable 05/09/2022     Priority: Medium    Allergic rhinitis due to allergen 05/09/2022     Priority: Medium    Obesity (BMI 30.0-34.9) 05/09/2022     Priority: Medium    Mild intermittent asthma without complication 91/50/4070     Priority: Medium    Vitamin B 12 deficiency 08/08/2021    Status post appendectomy 08/05/2021    Enlarged tonsils 11/03/2020    History of uterine fibroid 06/29/2020    Family history of premature CAD 02/21/2020    Heart palpitations 02/13/2020    Chronic fatigue 02/13/2020    Hot flashes 02/13/2020    Major depressive disorder with single episode, in full remission ([facility] [address] 02/13/2020    Abnormal EKG 02/13/2020    Anxiety 09/20/2019    History of colon polyps 03/10/2019    Slow transit constipation 03/10/2019    Asthma, exercise induced 10/19/2018    Snoring 07/02/2018    History of endometrial ablation 05/22/2017     2001      Hyperlipidemia with target LDL less than 100 05/22/2017    Hyperglycemia 05/22/2017    Prediabetes 05/22/2017    Severe obesity (BMI 35.0-35.9 with comorbidity) ([address] 05/22/2017    History of kidney stones 05/22/2017    Herpes simplex type 1 infection 05/23/2016 5/23/2016 + herpes simplex type I IgG antibody      History of tubal ligation     Vision abnormalities      glasses/contacts             PLAN:  Return in about 4 weeks (around 9/8/2022) for annual.  Vaginal cultures collected  Repeat Annual every 1 year  Cervical Cytology Evaluation begins at 23years old. If Negative Cytology, Follow-up screening per current guidelines. Return to the office in 4 weeks. Counseled on preventative health maintenance follow-up. Orders Placed This Encounter   Procedures    C.trachomatis N.gonorrhoeae DNA     Standing Status:   Future     Standing Expiration Date:   2/11/2023    Vaginitis DNA Probe     Standing Status:   Future     Standing Expiration Date:   8/11/2023           The patient, You Hicks is a 49 y.o. female, was seen with a total time spent of 20 minutes for the visit on this date of service by the E/M provider. The time component had both face to face and non face to face time spent in determining the total time component. Counseling and education regarding her diagnosis listed below and her options regarding those diagnoses were also included in determining her time component. Diagnosis Orders   1. Vaginal discharge  C.trachomatis N.gonorrhoeae DNA    Vaginitis DNA Probe           The patient had her preventative health maintenance recommendations and follow-up reviewed with her at the completion of her visit.

## 2022-08-12 ENCOUNTER — TELEPHONE (OUTPATIENT)
Dept: OBGYN CLINIC | Age: 47
End: 2022-08-12

## 2022-08-12 DIAGNOSIS — N76.0 ACUTE VAGINITIS: Primary | ICD-10-CM

## 2022-08-12 LAB
C TRACH DNA GENITAL QL NAA+PROBE: NEGATIVE
CANDIDA SPECIES, DNA PROBE: NEGATIVE
GARDNERELLA VAGINALIS, DNA PROBE: POSITIVE
N. GONORRHOEAE DNA: NEGATIVE
SOURCE: ABNORMAL
SPECIMEN DESCRIPTION: NORMAL
TRICHOMONAS VAGINALIS DNA: NEGATIVE

## 2022-08-12 RX ORDER — METRONIDAZOLE 500 MG/1
500 TABLET ORAL 2 TIMES DAILY
Qty: 14 TABLET | Refills: 0 | Status: CANCELLED | OUTPATIENT
Start: 2022-08-12 | End: 2022-08-19

## 2022-08-12 RX ORDER — METRONIDAZOLE 500 MG/1
500 TABLET ORAL 2 TIMES DAILY
Qty: 14 TABLET | Refills: 0 | Status: SHIPPED | OUTPATIENT
Start: 2022-08-12 | End: 2022-08-19

## 2022-08-12 NOTE — TELEPHONE ENCOUNTER
Tried calling patient regarding +BV and pt will need Flagyl 500mg. Pt voicemail box full and I will try later.

## 2022-08-12 NOTE — TELEPHONE ENCOUNTER
----- Message from Sharyl Bernheim, APRN - CNP sent at 8/12/2022  7:51 AM EDT -----  +BV- flagyl 500mg PO BID x7 days_

## 2022-08-23 ENCOUNTER — PATIENT MESSAGE (OUTPATIENT)
Dept: FAMILY MEDICINE CLINIC | Age: 47
End: 2022-08-23

## 2022-08-23 ENCOUNTER — TELEMEDICINE (OUTPATIENT)
Dept: FAMILY MEDICINE CLINIC | Age: 47
End: 2022-08-23
Payer: COMMERCIAL

## 2022-08-23 ENCOUNTER — TELEPHONE (OUTPATIENT)
Dept: FAMILY MEDICINE CLINIC | Age: 47
End: 2022-08-23

## 2022-08-23 DIAGNOSIS — H92.01 EAR PAIN, RIGHT: ICD-10-CM

## 2022-08-23 DIAGNOSIS — R53.82 CHRONIC FATIGUE: ICD-10-CM

## 2022-08-23 DIAGNOSIS — R73.03 PREDIABETES: ICD-10-CM

## 2022-08-23 DIAGNOSIS — H70.92 MASTOIDITIS, LEFT: Primary | ICD-10-CM

## 2022-08-23 DIAGNOSIS — G43.009 MIGRAINE WITHOUT AURA AND WITHOUT STATUS MIGRAINOSUS, NOT INTRACTABLE: Primary | ICD-10-CM

## 2022-08-23 DIAGNOSIS — E78.5 HYPERLIPIDEMIA WITH TARGET LDL LESS THAN 100: ICD-10-CM

## 2022-08-23 DIAGNOSIS — E66.01 SEVERE OBESITY (BMI 35.0-35.9 WITH COMORBIDITY) (HCC): ICD-10-CM

## 2022-08-23 DIAGNOSIS — Z23 ENCOUNTER FOR IMMUNIZATION: ICD-10-CM

## 2022-08-23 DIAGNOSIS — E55.9 VITAMIN D DEFICIENCY: ICD-10-CM

## 2022-08-23 PROCEDURE — 99214 OFFICE O/P EST MOD 30 MIN: CPT | Performed by: FAMILY MEDICINE

## 2022-08-23 ASSESSMENT — ENCOUNTER SYMPTOMS
VOMITING: 0
CONSTIPATION: 0
WHEEZING: 0
SHORTNESS OF BREATH: 0
ABDOMINAL DISTENTION: 0
ABDOMINAL PAIN: 0
CHEST TIGHTNESS: 0
DIARRHEA: 0
COUGH: 0

## 2022-08-23 NOTE — PROGRESS NOTES
2022    TELEHEALTH EVALUATION -- Audio/Visual (During WQ-87 public health emergency)      Renan Alexander (:  1975) is a 49 y.o. female,Established patient, here for evaluation of the following chief complaint(s): Fatigue, Headache, Obesity, Hyperglycemia, and Hyperlipidemia        ASSESSMENT/PLAN:    1. Migraine without aura and without status migrainosus, not Intractable   Worsening  To reschedule her MRI brain which was already approved  Follow-up with neurologist tomorrow  FMLA for flareups 7 times per 1 month, duration 1 day given, for 1 year  Advised Imitrex, Fioricet, Zofran, muscle relaxant as abortive  Topamax, magnesium and riboflavin as preventative  Increase fluids, and headache diary advised  2. Severe obesity (BMI 35.0-35.9 with comorbidity) ([facility] [address]  Improving  Okay to restart leftover Adipex, half tablet, when feeling better  Advised that it can worsen her headaches, The patient verbalizes understanding and agrees with the plan. Low carb, low fat diet, increase fruits and vegetables, and exercise 4-5 times a week 30-40 minutes a day, or walk 1-2 hours per day, or wear a pedometer and get at least 10,000 steps per day. 3. Chronic fatigue  Failing to change as expected. Will do basic labs to rule out certain common medical conditions: hematologic, renal, hepatic, electrolyte imbalances, thyroid disorders.    -     CBC; Future  -     Comprehensive Metabolic Panel; Future  -     TSH; Future  4. Prediabetes  worsening  Recheck labs  Lab Results   Component Value Date    LABA1C 6.0 2022    LABA1C 5.6 2021       -     Hemoglobin A1C; Future  -     Vitamin B12 & Folate; Future  We might restart Trulicity if she agrees, will think about it  Low carb, low fat diet, increase fruits and vegetables, and exercise 4-5 times a week 30-40 minutes a day, or walk 1-2 hours per day, or wear a pedometer and get at least 10,000 steps per day.     5. Hyperlipidemia with target LDL less than 100  Inadequately controlled  Continue Pravastatin 40 mg and recheck lipid panel  -     Lipid Panel; Future  6. Vitamin D deficiency  Unsure if improving or not. Will recheck level    -     Vitamin D 25 Hydroxy; Future  7. Encounter for immunization  -     pneumococcal 20-valent conjugat (PREVNAR) 0.5 ML NIDIA inj; Inject 0.5 mLs into the muscle once for 1 dose, Disp-0.5 mL, R-0Normal          Celia received counseling on the following healthy behaviors: nutrition, exercise, medication adherence, and weight loss    Reviewed prior labs and health maintenance  Discussed use, benefit, and side effects of prescribed medications. Barriers to medication compliance addressed. Patient given educational materials - see patient instructions  All patient questions answered. Patient voiced understanding. The patient's past medical,surgical, social, and family history as well as her current medications and allergies were reviewed as documented in today's encounter. Medications, labs, diagnostic studies, consultations and follow-up as documented in this encounter. Return in about 4 months (around 2022) for Visit type PHYSICAL, VISION screen, PHQ[facility]. [facility]    Future Appointments   Date Time Provider Diamond Gonzalez   2022  2:20 PM Cha Gaspar MD Neuro Spec [facility]and   2022 12:30 PM MATT Castro - [id number]         SUBJECTIVE/OBJECTIVE:  Flint Lombard (:  1975) has requested an audio/video evaluation for the following concern(s):Fatigue, Headache, Obesity, Hyperglycemia, and Hyperlipidemia    Celia complains of worsening headaches   Reports that she has been having Migraines 2 days in a row, throbbing, feeling Dizzy and tired due to it. Cannot focus. Took Fioricet, which helped a little but then the headache came back. Intensity of pain 2-4 out of 10, But it was 6/10. On Topamax , but made her dizzy. She took it as she didn't go to work.   Also reports associated nausea, blurry vision, photophobia and phonophobia . Had another headache yesterday, and today the headache came back  Reports location occipital, but then it radiates to the entire head. Had eye exam  Denies aura. 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 Reports she didn't do the MRI yet due to being claustrophobic  Needed to reschedule  Reports her headache can be anytime: morning, evening, and even when driving, and had to pull over as she couldn't drive anymore. Lately has been getting 4 headaches a week, sometimes lasting for 2 days. Noticed triggers as dehydration, noise, stress, and lights. Wants to lose weight. Lalo Figueroa was started on Adipex. In addition to the medication, patient also using diet and exercise as follows:  -diet:low carb diet  -exercise:before having headache, was exercising   She says she had to stop it when having headaches      We discussed that okay to restart leftover Adipex half tablet when feeling better  Advised that it can worsen her headaches    Medication side effects: anxiety and headache. Patient denies None, anorexia, nausea, vomiting, abdominal pain, involuntary weight loss, palpitations, insomnia, irritability, and tremor. Contraceptive method: Tubal ligation    Patient informed that when prescribed a controlled substance for weight loss, the provider is required by law to see the patient for an appointment every thirty days. This is neccessary to record the weight and blood pressure and to assess patient's efforts to lose weight, and to ensure there are no contraindications or adverse effects.    Patient advised contraception during the time of taking this medication, advised no alcohol use during this time      Adipex just used 6 tabs from the prescription  Made her anxiety worse      Blood pressure within normal limits 120/84 mmHg  BP Readings from Last 3 Encounters:   08/11/22 116/74   07/08/22 110/60 05/04/22 130/80      Weight has been improved, has lost 1 pound in 1 month  Wt Readings from Last 3 Encounters:   08/11/22 184 lb (83.5 kg)   07/08/22 185 lb (83.9 kg)   05/04/22 182 lb (82.6 kg)         Patient also reports having fatigue all the time especially when having headaches  Has not been checking her blood glucose. She has stopped semaglutide because of possible long-term side effects  She would like Trulicity in near future but not now  Denies increased appetite, thirst or polyuria. A1c worsening      Lab Results   Component Value Date    LABA1C 6.0 04/01/2022    LABA1C 5.6 07/23/2021    LABA1C 5.9 11/03/2020         Hyperlipidemia:  No new myalgias or GI upset on pravastatin (Pravachol). Medication compliance: compliant all of the time. Patient is  following a low fat, low cholesterol diet. LDL is high with high triglycerides  Lab Results   Component Value Date    LDLCHOLESTEROL 129 04/01/2022     Lab Results   Component Value Date    TRIG 207 (H) 04/01/2022    TRIG 262 (H) 02/14/2020    TRIG 156 (H) 11/03/2018       Celia has Vitamin D deficiency. Celia  is not taking Vitamin D supplementation   she feels tired. Previously borderline low vitamin D  Lab Results   Component Value Date    VITD25 34.0 04/06/2013       Vitamin B12 deficiency   Celia  is  taking Vitamin B12 oral supplementation. Celia reports fatigue. Lab Results   Component Value Date    [id number] 370 04/01/2022           Review of Systems   Constitutional:  Positive for fatigue. Negative for activity change, appetite change, chills, diaphoresis, fever and unexpected weight change. Eyes:  Positive for visual disturbance (contacts). Respiratory:  Negative for cough, chest tightness, shortness of breath and wheezing. Cardiovascular:  Negative for chest pain, palpitations and leg swelling. Gastrointestinal:  Positive for nausea (when having headache).  Negative for abdominal distention, abdominal pain, constipation, diarrhea and vomiting. Endocrine: Negative for cold intolerance, heat intolerance, polydipsia, polyphagia and polyuria. Neurological:  Positive for dizziness (on and off, when having headache) and headaches. Psychiatric/Behavioral:  Negative for sleep disturbance. The patient is nervous/anxious. Prior to Visit Medications    Medication Sig Taking? Authorizing Provider   topiramate (TOPAMAX) 25 MG tablet Take 1 tablet by mouth nightly For headaches prevention.  Drink plenty of fluids 64 oz/day Yes Cherri Spurling, MD   baclofen (LIORESAL) 10 MG tablet Take 1 tablet by mouth nightly as needed (MUSCLE SPASMS) Causes sedation, do not drive while taking this medication Yes Cherri Spurling, MD   Magnesium Oxide (MAGNESIUM-OXIDE) 250 MG TABS tablet Take 1 tablet by mouth daily Take with food, in the evening, preventative for headaches Yes Cherri Spurling, MD   Riboflavin 100 MG TABS Take 100 mg by mouth daily Preventative for headaches Yes Cherri Spurling, MD   butalbital-acetaminophen-caffeine (FIORICET, ESGIC) -40 MG per tablet Take 1 tablet by mouth every 6 hours as needed for Headaches or Migraine MAX 3 DAYS/WEEK Yes Cherri Spurling, MD   montelukast (SINGULAIR) 10 MG tablet Take 1 tablet by mouth nightly Yes Cherri Spurling, MD   fluticasone (FLONASE) 50 MCG/ACT nasal spray 2 sprays by Nasal route daily Yes Cherri Spurling, MD   ondansetron (ZOFRAN) 4 MG tablet Take 1 tablet by mouth every 6 hours as needed for Nausea, Vomiting or Other (headache) Yes Cherri Spurling, MD   Semaglutide 3 MG TABS Take 3 mg by mouth daily 1st step Yes Cherri Spurling, MD   pravastatin (PRAVACHOL) 40 MG tablet Take 1 tablet by mouth every evening With food, for high cholesterol Yes Cherri Spurling, MD   fluticasone (FLOVENT HFA) 44 MCG/ACT inhaler Inhale 2 puffs into the lungs 2 times daily Yes Cherri Spurling, MD   albuterol sulfate HFA (PROAIR HFA) 108 (90 Base) MCG/ACT inhaler Inhale 2 puffs into the lungs every 6 hours as needed for Wheezing or Shortness of Breath (cough) Yes Tracey Dunham MD   cyanocobalamin ([employer] VITAMIN B12) 1000 MCG tablet Take 1 tablet by mouth daily Yes Tracey Dunham MD   SUMAtriptan (IMITREX) 50 MG tablet Take 1 tablet by mouth once as needed for Migraine  Tracey Dunham MD       Social History     Tobacco Use    Smoking status: Never    Smokeless tobacco: Never    Tobacco comments:     Never smoked   Vaping Use    Vaping Use: Never used   Substance Use Topics    Alcohol use: Yes     Alcohol/week: 1.0 standard drink     Types: 1 Drinks containing 0.5 oz of alcohol per week     Comment: Raely drink    Drug use: No          PHYSICAL EXAMINATION:    Vital Signs: (As obtained by patient/caregiver or practitioner observation)  -vital signs stable and within normal limits except severe obesity per BMI 35.71 kg/m2  Patient-Reported Vitals 8/23/2022   Patient-Reported Weight 184 lbs   Patient-Reported Height 5 1\"   Patient-Reported Systolic 932   Patient-Reported Diastolic 84   Patient-Reported Pulse 72   Patient-Reported Temperature 98.2   Patient-Reported SpO2 98   Patient-Reported Peak Flow Na          Intensity of pain is: 2-4 out of 10 headache      Constitutional: [x] Appears well-developed and well-nourished [x] No apparent distress      [x] Abnormal-looks tired      Mental status  [x] Alert and awake  [x] Oriented to person/place/time [x]Able to follow commands      Eyes:  EOM    [x]  Normal  [] Abnormal-  Sclera  [x]  Normal  [] Abnormal -         Discharge [x]  None visible  [] Abnormal -    HENT:   [x] Normocephalic, atraumatic.   [] Abnormal   [x] Mouth/Throat: Mucous membranes are moist.     External Ears [x] Normal  [] Abnormal-     Neck: [x] No visualized mass     Pulmonary/Chest: [x] Respiratory effort normal.  [x] No visualized signs of difficulty breathing or respiratory distress        [] Abnormal        Musculoskeletal:   [x] Normal gait with no signs of ataxia         [x] Normal range of motion of neck        [] Abnormal-       Neurological:        [x] No Facial Asymmetry (Cranial nerve 7 motor function) (limited exam to video visit)          [x] No gaze palsy        [] Abnormal-            Skin:        [x] No significant exanthematous lesions or discoloration noted on facial skin         [] Abnormal-            Psychiatric:      [x] No Hallucinations       [x]Mood is normal      [x]Behavior is normal      [x]Judgment is normal      [x]Thought content is normal       [] Abnormal-     Other pertinent observable physical exam findings-none    Due to this being a TeleHealth encounter, evaluation of the following organ systems is limited: Vitals/Constitutional/EENT/Resp/CV/GI//MS/Neuro/Skin/Heme-Lymph-Imm. I personally reviewed most recent labs reviewed with the patient and all questions fully answered.    Hyperlipidemia  Hyperglycemia  Prediabetes  Vitamin D and vitamin B12 low     otherwise labs within normal limits        Lab Results   Component Value Date    WBC 6.0 07/23/2021    HGB 12.8 07/23/2021    HCT 41.4 07/23/2021    MCV 95.6 07/23/2021     07/23/2021     Lab Results   Component Value Date    LABA1C 6.0 04/01/2022    LABA1C 5.6 07/23/2021    LABA1C 5.9 11/03/2020       Lab Results   Component Value Date/Time     04/01/2022 09:54 AM    K 4.3 04/01/2022 09:54 AM     04/01/2022 09:54 AM    CO2 25 04/01/2022 09:54 AM    BUN 8 04/01/2022 09:54 AM    CREATININE 0.71 05/31/2022 02:07 PM    GLUCOSE 89 04/01/2022 09:54 AM    GLUCOSE 90 12/16/2011 03:47 PM    CALCIUM 9.3 04/01/2022 09:54 AM        Lab Results   Component Value Date    ALT 15 05/31/2022    AST 13 05/31/2022    ALKPHOS 65 04/01/2022    BILITOT 0.41 04/01/2022       Lab Results   Component Value Date    TSH 1.60 05/31/2022       Lab Results   Component Value Date    CHOL 221 (H) 04/01/2022    CHOL 227 (H) 02/14/2020    CHOL 194 11/03/2018     Lab Results   Component Value Date    TRIG 207 (H) 04/01/2022    TRIG 262 (H) 02/14/2020    TRIG 156 (H) 11/03/2018     Lab Results   Component Value Date    HDL 51 04/01/2022    HDL 47 07/23/2021    HDL 42 02/14/2020     Lab Results   Component Value Date    LDLCHOLESTEROL 129 04/01/2022    LDLCHOLESTEROL 122 07/23/2021    LDLCHOLESTEROL 133 (H) 02/14/2020       Lab Results   Component Value Date    CHOLHDLRATIO 4.3 04/01/2022    CHOLHDLRATIO 4.5 07/23/2021    CHOLHDLRATIO 5.4 (H) 02/14/2020       Lab Results   Component Value Date    LABA1C 6.0 04/01/2022    LABA1C 5.6 07/23/2021    LABA1C 5.9 11/03/2020         Lab Results   Component Value Date    [id number] 370 04/01/2022       Lab Results   Component Value Date    VITD25 34.0 04/06/2013       Orders Placed This Encounter   Medications    pneumococcal 20-valent conjugat (PREVNAR) 0.5 ML NIDIA inj     Sig: Inject 0.5 mLs into the muscle once for 1 dose     Dispense:  0.5 mL     Refill:  0       Orders Placed This Encounter   Procedures    CBC     Standing Status:   Future     Standing Expiration Date:   8/23/2023    Comprehensive Metabolic Panel     Standing Status:   Future     Standing Expiration Date:   10/20/2022    Hemoglobin A1C     Standing Status:   Future     Standing Expiration Date:   8/23/2023    TSH     Standing Status:   Future     Standing Expiration Date:   8/23/2023    Vitamin B12 & Folate     Standing Status:   Future     Standing Expiration Date:   10/20/2022    Vitamin D 25 Hydroxy     Standing Status:   Future     Standing Expiration Date:   8/23/2023    Lipid Panel     Standing Status:   Future     Standing Expiration Date:   8/23/2023     Order Specific Question:   Is Patient Fasting?/# of Hours     Answer:   8-10 Hours, water ok to drink       Medications Discontinued During This Encounter   Medication Reason    Semaglutide 3 MG TABS Patient Choice              Crystal Jayford Minus, was evaluated through a synchronous (real-time) audio-video

## 2022-08-23 NOTE — TELEPHONE ENCOUNTER
Called patient to schedule routine follow up appointment Return in about 4 months (around 12/23/2022) for Visit type PHYSICAL, VISION screen, PHQ9. Sherryle Moder with  . Patient did not answer so UNABLE TO Rubi Herrera. For patient to call office back to schedule follow up appointment as requested.

## 2022-08-23 NOTE — TELEPHONE ENCOUNTER
Noted. Thank you!   [employer]de[employer] message sent to the patient    Future Appointments   Date Time Provider Diamond Lisa   8/24/2022  2:20 PM Adia Ulloa MD Neuro Spec [address] [facility]   9/9/2022 12:30 PM MATT Patterson -  [facility]

## 2022-08-24 ENCOUNTER — OFFICE VISIT (OUTPATIENT)
Dept: NEUROLOGY | Age: 47
End: 2022-08-24
Payer: COMMERCIAL

## 2022-08-24 VITALS
SYSTOLIC BLOOD PRESSURE: 149 MMHG | BODY MASS INDEX: 35.68 KG/M2 | HEART RATE: 86 BPM | HEIGHT: 61 IN | DIASTOLIC BLOOD PRESSURE: 88 MMHG | WEIGHT: 189 LBS

## 2022-08-24 DIAGNOSIS — G43.119 INTRACTABLE MIGRAINE WITH AURA WITHOUT STATUS MIGRAINOSUS: Primary | ICD-10-CM

## 2022-08-24 PROCEDURE — 99204 OFFICE O/P NEW MOD 45 MIN: CPT | Performed by: STUDENT IN AN ORGANIZED HEALTH CARE EDUCATION/TRAINING PROGRAM

## 2022-08-24 RX ORDER — BUSPIRONE HYDROCHLORIDE 5 MG/1
5 TABLET ORAL 3 TIMES DAILY
COMMUNITY

## 2022-08-24 RX ORDER — AMITRIPTYLINE HYDROCHLORIDE 25 MG/1
25 TABLET, FILM COATED ORAL NIGHTLY
Qty: 90 TABLET | Refills: 1 | Status: SHIPPED | OUTPATIENT
Start: 2022-08-24

## 2022-08-24 ASSESSMENT — ENCOUNTER SYMPTOMS
GASTROINTESTINAL NEGATIVE: 1
RESPIRATORY NEGATIVE: 1
EYES NEGATIVE: 1

## 2022-08-24 NOTE — PROGRESS NOTES
[facility] NEUROLOGY SPECIALIST  [address]  Dept: [phone]    PATIENT NAME: Flint Lombard  PATIENT MRN: [id number]  PRIMARY CARE PHYSICIAN: Opal Perry MD    HPI:      Flint Lombard is a 49 y.o. female who presents to clinic today for evaluation of Migraine     She notes she has a history of ocular migraines. She would see a kaleidoscope in her peripheral vision without headaches for the past 2 years and they occurred about 2-3 times a year. She notes in May 2022 she started having migraines. The pain is predominantly located in occipital region and radiates to the entire head and retro-orbitally. Usually the headache is not preceded by an aura. Currently, headaches are occurring about 1-2 times a month. She has recently been more stressed out and wakes up with a headache and can last upto 2 days. She is currently having 4 headaches per week now. Patient describes the quality of pain as  combination of sharp and throbbing which varies in intensity 6/10. Associated symptoms include  nausea/vomiting, photophobia, phonophobia, and vertigo. Headaches are typically triggered by stress, bright lights, and caffeine    History of:  Family history of headaches or migraines: no  Renal stones: yes - 2014   Head/Neck Trauma: concussion 2008  Stressors: yes - daughter is bipolar   Sleep:             Difficulty in initiating or maintaining sleep: no,              Snoring: yes     Headache Medications  Current abortive meds: Fioricet , sumatriptan, magnesium   Current prophylactic meds: Topamax 25 mg nightly for 6 months  Previous abortive medications tried: none  Previous prophylactic medications tried: none                                           PREVIOUS WORKUP:     Past Medical History:   Diagnosis Date    Allergic rhinitis due to allergen 5/9/2022    Anxiety 9/20/2019    Asthma     Current mild episode of major depressive disorder without prior episode ([facility] [address] 2/13/2020 Dispense Refill    busPIRone (BUSPAR) 5 MG tablet Take 5 mg by mouth 3 times daily      amitriptyline (ELAVIL) 25 MG tablet Take 1 tablet by mouth nightly 90 tablet 1    topiramate (TOPAMAX) 25 MG tablet Take 1 tablet by mouth nightly For headaches prevention. Drink plenty of fluids 64 oz/day 90 tablet 0    baclofen (LIORESAL) 10 MG tablet Take 1 tablet by mouth nightly as needed (MUSCLE SPASMS) Causes sedation, do not drive while taking this medication 30 tablet 0    Magnesium Oxide (MAGNESIUM-OXIDE) 250 MG TABS tablet Take 1 tablet by mouth daily Take with food, in the evening, preventative for headaches 90 tablet 3    Riboflavin 100 MG TABS Take 100 mg by mouth daily Preventative for headaches 90 tablet 3    butalbital-acetaminophen-caffeine (FIORICET, ESGIC) -40 MG per tablet Take 1 tablet by mouth every 6 hours as needed for Headaches or Migraine MAX 3 DAYS/WEEK 30 tablet 0    montelukast (SINGULAIR) 10 MG tablet Take 1 tablet by mouth nightly 30 tablet 3    fluticasone (FLONASE) 50 MCG/ACT nasal spray 2 sprays by Nasal route daily 16 g 3    SUMAtriptan (IMITREX) 50 MG tablet Take 1 tablet by mouth once as needed for Migraine 9 tablet 5    ondansetron (ZOFRAN) 4 MG tablet Take 1 tablet by mouth every 6 hours as needed for Nausea, Vomiting or Other (headache) 24 tablet 0    pravastatin (PRAVACHOL) 40 MG tablet Take 1 tablet by mouth every evening With food, for high cholesterol 90 tablet 3    fluticasone (FLOVENT HFA) 44 MCG/ACT inhaler Inhale 2 puffs into the lungs 2 times daily 10.6 g 3    cyanocobalamin ([employer] VITAMIN B12) 1000 MCG tablet Take 1 tablet by mouth daily 30 tablet 3    albuterol sulfate HFA (PROAIR HFA) 108 (90 Base) MCG/ACT inhaler Inhale 2 puffs into the lungs every 6 hours as needed for Wheezing or Shortness of Breath (cough) (Patient not taking: Reported on 8/24/2022) 8 g 3     No current facility-administered medications for this visit.         No Known Allergies     REVIEW OF SYSTEMS: Review of Systems   Constitutional: Negative. HENT: Negative. Eyes: Negative. Respiratory: Negative. Cardiovascular: Negative. Gastrointestinal: Negative. Endocrine: Negative. Genitourinary: Negative. Musculoskeletal: Negative. Skin: Negative. Neurological:  Positive for headaches. Negative for dizziness, tremors, seizures, syncope, facial asymmetry, speech difficulty, weakness, light-headedness and numbness. Hematological: Negative. Psychiatric/Behavioral:  Negative for sleep disturbance. All other systems reviewed and are negative. NEUROLOGICAL EXAMINATION:   VITALS  BP (!) 149/88 (Site: Left Lower Arm, Position: Sitting, Cuff Size: Medium Adult)   Pulse 86   Ht 5' 1\" (1.549 m)   Wt 189 lb (85.7 kg)   BMI 35.71 kg/m²      Mental status    Alert and oriented x 3; intact memory with no confusion, speech or language problems; no hallucinations or delusions     Cranial nerves    II - visual fields intact to confrontation  III, IV, VI - extra-ocular muscles full: no pupillary defect; no QUIQUE, no nystagmus, no ptosis   V - normal facial sensation                                                               VII - normal facial symmetry                                                             VIII - intact hearing                                                                             IX, X - symmetrical palate                                                                  XI - symmetrical shoulder shrug                                                       XII - tongue midline without atrophy or fasciculation      Motor function  Normal muscle bulk and tone; strength 5/5 on all 4 extremities, no pronator drift      Sensory function Intact to light touch, pinprick, vibration, proprioception on all 4 extremities      Cerebellar Intact fine motor movement. No involuntary movements or tremors.  No ataxia or dysmetria on finger to nose or heel to shin testing Reflex function DTR 2+ on bilateral UE and LE, symmetric. Gait                   normal base and arm swing        ASSESSMENT / PLAN:   Rubio Gamez is a 49 y.o. female that established care with neurology for migraines without aura.     Plan:  MRI brain with and without co (open MRI )  Amitriptyline 25 nightly  Stop Topamax given history of kidney stones   Sumatriptan for abortive therapy       Josias De La Rosa MD   Neurology & Sleep Medicine  [facility].

## 2022-08-24 NOTE — PATIENT INSTRUCTIONS
Start Amitriptyline 25 mg nightly  Stop topamax  Sumatriptan for rescue therapy   Magnesium oxide 400 mg   MRI brain

## 2022-08-26 ENCOUNTER — HOSPITAL ENCOUNTER (OUTPATIENT)
Age: 47
Setting detail: SPECIMEN
Discharge: HOME | End: 2022-08-26
Payer: COMMERCIAL

## 2022-08-26 DIAGNOSIS — E55.9 VITAMIN D DEFICIENCY: ICD-10-CM

## 2022-08-26 DIAGNOSIS — R73.03 PREDIABETES: ICD-10-CM

## 2022-08-26 DIAGNOSIS — R53.82 CHRONIC FATIGUE: ICD-10-CM

## 2022-08-26 DIAGNOSIS — E78.5 HYPERLIPIDEMIA WITH TARGET LDL LESS THAN 100: ICD-10-CM

## 2022-08-26 LAB
ALBUMIN SERPL-MCNC: 4.5 G/DL (ref 3.5–5.2)
ALP BLD-CCNC: 67 U/L (ref 35–104)
ALT SERPL-CCNC: 17 U/L (ref 5–33)
ANION GAP SERPL CALCULATED.3IONS-SCNC: 11 MMOL/L (ref 9–17)
AST SERPL-CCNC: 14 U/L
BILIRUB SERPL-MCNC: 0.56 MG/DL (ref 0.3–1.2)
BUN BLDV-MCNC: 8 MG/DL (ref 6–20)
CALCIUM SERPL-MCNC: 9.8 MG/DL (ref 8.6–10.4)
CHLORIDE BLD-SCNC: 100 MMOL/L (ref 98–107)
CHOLESTEROL/HDL RATIO: 3.9
CHOLESTEROL: 228 MG/DL
CO2: 26 MMOL/L (ref 20–31)
CREAT SERPL-MCNC: 0.68 MG/DL (ref 0.5–0.9)
ESTIMATED AVERAGE GLUCOSE: 120 MG/DL
FOLATE: 17.8 NG/ML
GFR AFRICAN AMERICAN: >60 ML/MIN
GFR NON-AFRICAN AMERICAN: >60 ML/MIN
GFR SERPL CREATININE-BSD FRML MDRD: ABNORMAL ML/MIN/{1.73_M2}
GLUCOSE BLD-MCNC: 111 MG/DL (ref 70–99)
HBA1C MFR BLD: 5.8 % (ref 4–6)
HCT VFR BLD CALC: 39.7 % (ref 36–46)
HDLC SERPL-MCNC: 59 MG/DL
HEMOGLOBIN: 13.4 G/DL (ref 12–16)
LDL CHOLESTEROL: 130 MG/DL (ref 0–130)
MCH RBC QN AUTO: 30.9 PG (ref 26–34)
MCHC RBC AUTO-ENTMCNC: 33.8 G/DL (ref 31–37)
MCV RBC AUTO: 91.3 FL (ref 80–100)
PDW BLD-RTO: 13.3 % (ref 11.5–14.9)
PLATELET # BLD: 283 K/UL (ref 150–450)
PMV BLD AUTO: 8.4 FL (ref 6–12)
POTASSIUM SERPL-SCNC: 4.2 MMOL/L (ref 3.7–5.3)
RBC # BLD: 4.35 M/UL (ref 4–5.2)
SODIUM BLD-SCNC: 137 MMOL/L (ref 135–144)
TOTAL PROTEIN: 7.9 G/DL (ref 6.4–8.3)
TRIGL SERPL-MCNC: 194 MG/DL
TSH SERPL DL<=0.05 MIU/L-ACNC: 2.66 UIU/ML (ref 0.3–5)
VITAMIN B-12: 533 PG/ML (ref 232–1245)
VITAMIN D 25-HYDROXY: 29 NG/ML
WBC # BLD: 5.7 K/UL (ref 3.5–11)

## 2022-08-26 PROCEDURE — 82306 VITAMIN D 25 HYDROXY: CPT

## 2022-08-26 PROCEDURE — 82607 VITAMIN B-12: CPT

## 2022-08-26 PROCEDURE — 80061 LIPID PANEL: CPT

## 2022-08-26 PROCEDURE — 85027 COMPLETE CBC AUTOMATED: CPT

## 2022-08-26 PROCEDURE — 84443 ASSAY THYROID STIM HORMONE: CPT

## 2022-08-26 PROCEDURE — 80053 COMPREHEN METABOLIC PANEL: CPT

## 2022-08-26 PROCEDURE — 82746 ASSAY OF FOLIC ACID SERUM: CPT

## 2022-08-26 PROCEDURE — 36415 COLL VENOUS BLD VENIPUNCTURE: CPT

## 2022-08-26 PROCEDURE — 83036 HEMOGLOBIN GLYCOSYLATED A1C: CPT

## 2022-08-26 NOTE — RESULT ENCOUNTER NOTE
Tasia comment sent to patient.   High cholesterol, high triglycerides, mildly high blood glucose  Other labs normal, pending 3 more results  Future Appointments  9/9/2022   12:30 PM   MATT Horvath - NP  [address]  12/1/2022  3:20 PM    Oscar Collins MD            Neuro Spec          [address] [facility]

## 2022-08-27 DIAGNOSIS — E55.9 VITAMIN D DEFICIENCY: Primary | ICD-10-CM

## 2022-08-27 DIAGNOSIS — E78.5 HYPERLIPIDEMIA WITH TARGET LDL LESS THAN 100: ICD-10-CM

## 2022-08-27 RX ORDER — ERGOCALCIFEROL 1.25 MG/1
50000 CAPSULE ORAL WEEKLY
Qty: 12 CAPSULE | Refills: 0 | Status: SHIPPED | OUTPATIENT
Start: 2022-08-27

## 2022-08-27 RX ORDER — PRAVASTATIN SODIUM 40 MG
40 TABLET ORAL EVERY EVENING
Qty: 90 TABLET | Refills: 3 | Status: SHIPPED | OUTPATIENT
Start: 2022-08-27 | End: 2022-08-28 | Stop reason: SDUPTHER

## 2022-08-27 NOTE — RESULT ENCOUNTER NOTE
Please notify patient: Mild prediabetes but improving, blood glucose 111 mildly high  Vitamin D is low, high doses vitamin D 60 the pharmacy to take weekly, with food for 3 months  High cholesterol and triglycerides, I refilled the pravastatin  Otherwise labs within normal limits  continue current treatment    Future Appointments  9/9/2022   12:30 PM   Sg Weinstein[address]  12/1/2022  3:20 PM    Adán Arellano MD            Neuro Spec          [facility]

## 2022-08-28 DIAGNOSIS — E78.5 HYPERLIPIDEMIA WITH TARGET LDL LESS THAN 100: ICD-10-CM

## 2022-08-28 DIAGNOSIS — J30.89 SEASONAL ALLERGIC RHINITIS DUE TO OTHER ALLERGIC TRIGGER: ICD-10-CM

## 2022-08-29 ENCOUNTER — TELEPHONE (OUTPATIENT)
Dept: FAMILY MEDICINE CLINIC | Age: 47
End: 2022-08-29

## 2022-08-29 DIAGNOSIS — R73.03 PREDIABETES: ICD-10-CM

## 2022-08-29 DIAGNOSIS — E66.01 SEVERE OBESITY (BMI 35.0-35.9 WITH COMORBIDITY) (HCC): ICD-10-CM

## 2022-08-29 RX ORDER — FLUTICASONE PROPIONATE 50 MCG
2 SPRAY, SUSPENSION (ML) NASAL DAILY
Qty: 16 G | Refills: 3 | Status: SHIPPED | OUTPATIENT
Start: 2022-08-29

## 2022-08-29 RX ORDER — PEN NEEDLE, DIABETIC 31 GX5/16"
NEEDLE, DISPOSABLE MISCELLANEOUS
Qty: 12 EACH | Refills: 3 | Status: SHIPPED | OUTPATIENT
Start: 2022-08-29

## 2022-08-29 RX ORDER — PRAVASTATIN SODIUM 40 MG
40 TABLET ORAL EVERY EVENING
Qty: 90 TABLET | Refills: 3 | Status: SHIPPED | OUTPATIENT
Start: 2022-08-29

## 2022-08-29 NOTE — TELEPHONE ENCOUNTER
Patient has migraine headaches, chronic condition   So YES    I did look over completed FMLA form and it is an intermitted  Someone should initial and date if we addend, please ask Dr. Houston Paula

## 2022-08-29 NOTE — TELEPHONE ENCOUNTER
Received a fax from [employer] for patient. They received [facility] paperwork for patient but one of the questions was not answered. Please advise. Intermittent Leave: Patient requires occasional or irregular absences from work due to episodes  Requested Dates: 08/10/2022-08/09/2023-Can we approve these dates? Y or N  If not, what would be a more appropriate date range?

## 2022-08-29 NOTE — TELEPHONE ENCOUNTER
Marked yes and placed in Dr Navdeep Caicedo folder for signature as Dr Mack Quintero is on vacation. Thank you!

## 2022-08-29 NOTE — TELEPHONE ENCOUNTER
Please Approve or Refuse.   Send to Pharmacy per Pt's Request:      Next Visit Date:  Visit date not found   Last Visit Date: 8/23/2022    Hemoglobin A1C (%)   Date Value   08/26/2022 5.8   04/01/2022 6.0   07/23/2021 5.6             ( goal A1C is < 7)   BP Readings from Last 3 Encounters:   08/24/22 (!) 149/88   08/11/22 116/74   07/08/22 110/60          (goal 120/80)  BUN   Date Value Ref Range Status   08/26/2022 8 6 - 20 mg/dL Final     Creatinine   Date Value Ref Range Status   08/26/2022 0.68 0.50 - 0.90 mg/dL Final     Potassium   Date Value Ref Range Status   08/26/2022 4.2 3.7 - 5.3 mmol/L Final

## 2022-08-30 ASSESSMENT — ENCOUNTER SYMPTOMS: NAUSEA: 1

## 2022-09-09 ENCOUNTER — HOSPITAL ENCOUNTER (OUTPATIENT)
Dept: MRI IMAGING | Facility: CLINIC | Age: 47
Discharge: HOME | End: 2022-09-11
Payer: COMMERCIAL

## 2022-09-09 DIAGNOSIS — G43.119 INTRACTABLE MIGRAINE WITH AURA WITHOUT STATUS MIGRAINOSUS: ICD-10-CM

## 2022-09-09 PROCEDURE — A9579 GAD-BASE MR CONTRAST NOS,1ML: HCPCS | Performed by: STUDENT IN AN ORGANIZED HEALTH CARE EDUCATION/TRAINING PROGRAM

## 2022-09-09 PROCEDURE — 6360000004 HC RX CONTRAST MEDICATION: Performed by: STUDENT IN AN ORGANIZED HEALTH CARE EDUCATION/TRAINING PROGRAM

## 2022-09-09 PROCEDURE — 70553 MRI BRAIN STEM W/O & W/DYE: CPT

## 2022-09-09 RX ADMIN — GADOTERIDOL 20 ML: 279.3 INJECTION, SOLUTION INTRAVENOUS at 13:56

## 2022-09-14 NOTE — RESULT ENCOUNTER NOTE
Please let patient know that MRI brain was normal but showed a small amount of fluid in the right mastoid air cells. She can follow up with PCP for this issue.

## 2022-09-19 RX ORDER — NEOMYCIN SULFATE, POLYMYXIN B SULFATE AND HYDROCORTISONE 10; 3.5; 1 MG/ML; MG/ML; [USP'U]/ML
3 SUSPENSION/ DROPS AURICULAR (OTIC) 4 TIMES DAILY
Qty: 10 ML | Refills: 0 | Status: SHIPPED | OUTPATIENT
Start: 2022-09-19

## 2022-09-19 RX ORDER — AZITHROMYCIN 250 MG/1
TABLET, FILM COATED ORAL
Qty: 6 TABLET | Refills: 0 | Status: SHIPPED | OUTPATIENT
Start: 2022-09-19 | End: 2022-09-24

## 2022-09-19 NOTE — TELEPHONE ENCOUNTER
From: Dr. Tona Jarvis  To: Shante Mckoen: 8/23/2022 5:09 PM EDT  Subject: Return in about 4 months (around 12/23/2022) for Visit type PHYSICAL, VISION screen, PHQ9    Crystal,  Please call us or reply to this message to schedule your next appointment,    Return in about 4 months (around 12/23/2022) for Visit type PHYSICAL, VISION screen, PHQ9    If you have any questions, please let me know.     Tona Jarvis MD  [address] [facility] [address]  [address] [place] [address] AT THE [facility] [address]  Dept: [phone]  Dept Fax: [phone]

## 2022-09-19 NOTE — TELEPHONE ENCOUNTER
Diagnosis Orders   1. Mastoiditis, left  azithromycin (ZITHROMAX) 250 MG tablet    neomycin-polymyxin-hydrocortisone (CORTISPORIN) 3.5-52458-9 otic suspension      2.  Ear pain, right  azithromycin (ZITHROMAX) 250 MG tablet    neomycin-polymyxin-hydrocortisone (CORTISPORIN) 3.5-17780-6 otic suspension           Future Appointments   Date Time Provider Diamond Dueñasisti   9/23/2022 12:45 PM MATT Ramos -  Ne [facility]   12/1/2022  3:20 PM Jessica Nuñez MD Neuro [facility] Follow-up Pall Progress Note    Dwaine Nugent MD  [phone]    No new respiratory events overnight.  Denies SOB/CP. No abdominal complaints    Medications:  Vital Signs Last 24 Hrs  T(C): 36.5 (02 Apr 2020 15:33), Max: 37.4 (01 Apr 2020 20:00)  T(F): 97.7 (02 Apr 2020 15:33), Max: 99.4 (01 Apr 2020 20:00)  HR: 55 (02 Apr 2020 15:33) (55 - 64)  BP: 110/60 (02 Apr 2020 15:33) (103/63 - 146/59)  BP(mean): --  RR: 19 (02 Apr 2020 15:33) (15 - 19)  SpO2: 96% (02 Apr 2020 15:33) (89% - 97%)          04-01 @ 07:01  -  04-02 @ 07:00  --------------------------------------------------------  IN: 100 mL / OUT: 0 mL / NET: 100 mL        LABS:                        10.9   5.85  )-----------( 221      ( 02 Apr 2020 10:16 )             33.7     04-02    138  |  97  |  15  ----------------------------<  142<H>  2.8<LL>   |  34<H>  |  0.53    Ca    8.0<L>      02 Apr 2020 10:16          Procalcitonin, Serum: 0.17 ng/mL (03-31-20 @ 09:15)    Serum Pro-Brain Natriuretic Peptide: 1784 pg/mL (03-31-20 @ 09:15)      CULTURES:  Culture Results:   No growth to date. (03-31 @ 09:15)  Culture Results:   No growth to date. (03-31 @ 09:15)  Culture Results:   No growth (03-31 @ 09:15)    Most recent blood culture -- 03-31 @ 09:15   -- -- .Blood Blood-Peripheral 03-31 @ 09:15      Physical Examination:  PULM: Clear to auscultation bilaterally, no significant sputum production    ABD: Soft, non-tender  EXT:  No clubbing, cyanosis, or edema    RADIOLOGY REVIEWED  CXR:    CT chest:    TTE:

## 2022-09-23 ENCOUNTER — OFFICE VISIT (OUTPATIENT)
Dept: OBGYN CLINIC | Age: 47
End: 2022-09-23
Payer: COMMERCIAL

## 2022-09-23 ENCOUNTER — HOSPITAL ENCOUNTER (OUTPATIENT)
Age: 47
Setting detail: SPECIMEN
Discharge: HOME | End: 2022-09-23

## 2022-09-23 VITALS
SYSTOLIC BLOOD PRESSURE: 124 MMHG | DIASTOLIC BLOOD PRESSURE: 82 MMHG | WEIGHT: 188 LBS | HEIGHT: 61 IN | BODY MASS INDEX: 35.5 KG/M2

## 2022-09-23 DIAGNOSIS — Z01.419 WELL WOMAN EXAM WITH ROUTINE GYNECOLOGICAL EXAM: Primary | ICD-10-CM

## 2022-09-23 PROBLEM — A63.0 GENITAL WARTS: Status: ACTIVE | Noted: 2022-09-23

## 2022-09-23 PROCEDURE — 99396 PREV VISIT EST AGE 40-64: CPT | Performed by: NURSE PRACTITIONER

## 2022-09-23 RX ORDER — PODOFILOX 5 MG/ML
SOLUTION TOPICAL
Qty: 1 EACH | Refills: 1 | Status: SHIPPED | OUTPATIENT
Start: 2022-09-23 | End: 2022-10-03

## 2022-09-23 NOTE — PROGRESS NOTES
Past Surgical History:   Procedure Laterality Date    APPENDECTOMY  07/31/2021    [facility]    COLONOSCOPY      COLONOSCOPY  06/28/2019    COLONOSCOPY N/A 06/28/2019    COLORECTAL CANCER SCREENING, NOT HIGH RISK performed by Nelda Fonseca MD at [facility]      LITHOTRIPSY      TUBAL LIGATION      TUBAL LIGATION      WISDOM TOOTH EXTRACTION       Family History   Problem Relation Age of Onset    Diabetes Father         possible type 1    Heart Attack Father 35    Bipolar Disorder Sister     Lung Cancer Paternal Grandmother     Heart Attack Paternal Grandfather     Colon Cancer Neg Hx     Breast Cancer Neg Hx      Social History     Socioeconomic History    Marital status: Single     Spouse name: Not on file    Number of children: Not on file    Years of education: Not on file    Highest education level: Not on file   Occupational History    Not on file   Tobacco Use    Smoking status: Never    Smokeless tobacco: Never    Tobacco comments:     Never smoked   Vaping Use    Vaping Use: Never used   Substance and Sexual Activity    Alcohol use:  Yes     Alcohol/week: 1.0 standard drink     Types: 1 Drinks containing 0.5 oz of alcohol per week     Comment: Raely drink    Drug use: No    Sexual activity: Yes     Partners: Male     Birth control/protection: Surgical     Comment: TL   Other Topics Concern    Not on file   Social History Narrative    Not on file     Social Determinants of Health     Financial Resource Strain: Not on file   Food Insecurity: Not on file   Transportation Needs: Not on file   Physical Activity: Not on file   Stress: Not on file   Social Connections: Not on file   Intimate Partner Violence: Not on file   Housing Stability: Not on file       MEDICATIONS:  Current Outpatient Medications   Medication Sig Dispense Refill    podofilox (CONDYLOX) 0.5 % external solution Apply topically 2 times daily x 3 days serge row 1 each 1 azithromycin (ZITHROMAX) 250 MG tablet 500 mg orally on day one followed by 250 mg daily on days two through five 6 tablet 0    neomycin-polymyxin-hydrocortisone (CORTISPORIN) 3.5-38938-9 otic suspension Place 3 drops into both ears 4 times daily OK to substitute. For 10 days 10 mL 0    fluticasone (FLONASE) 50 MCG/ACT nasal spray 2 sprays by Nasal route daily 16 g 3    pravastatin (PRAVACHOL) 40 MG tablet Take 1 tablet by mouth every evening With food, for high cholesterol 90 tablet 3    Dulaglutide (TRULICITY) 1.44 RT/3.3PN SOPN Inject 0.75 mg into the skin every 7 days If pen needle is needed, please request 4 pen 5    Insulin Pen Needle (B-D UF III MINI PEN NEEDLES) 31G X 5 MM MISC Weekly with Trulicity 12 each 3    vitamin D (ERGOCALCIFEROL) 1.25 MG (27593 UT) CAPS capsule Take 1 capsule by mouth once a week 12 capsule 0    busPIRone (BUSPAR) 5 MG tablet Take 5 mg by mouth 3 times daily      amitriptyline (ELAVIL) 25 MG tablet Take 1 tablet by mouth nightly 90 tablet 1    topiramate (TOPAMAX) 25 MG tablet Take 1 tablet by mouth nightly For headaches prevention.  Drink plenty of fluids 64 oz/day 90 tablet 0    baclofen (LIORESAL) 10 MG tablet Take 1 tablet by mouth nightly as needed (MUSCLE SPASMS) Causes sedation, do not drive while taking this medication 30 tablet 0    Magnesium Oxide (MAGNESIUM-OXIDE) 250 MG TABS tablet Take 1 tablet by mouth daily Take with food, in the evening, preventative for headaches 90 tablet 3    Riboflavin 100 MG TABS Take 100 mg by mouth daily Preventative for headaches 90 tablet 3    butalbital-acetaminophen-caffeine (FIORICET, ESGIC) -40 MG per tablet Take 1 tablet by mouth every 6 hours as needed for Headaches or Migraine MAX 3 DAYS/WEEK 30 tablet 0    montelukast (SINGULAIR) 10 MG tablet Take 1 tablet by mouth nightly 30 tablet 3    ondansetron (ZOFRAN) 4 MG tablet Take 1 tablet by mouth every 6 hours as needed for Nausea, Vomiting or Other (headache) 24 tablet 0 fluticasone (FLOVENT HFA) 44 MCG/ACT inhaler Inhale 2 puffs into the lungs 2 times daily 10.6 g 3    albuterol sulfate HFA (PROAIR HFA) 108 (90 Base) MCG/ACT inhaler Inhale 2 puffs into the lungs every 6 hours as needed for Wheezing or Shortness of Breath (cough) 8 g 3    cyanocobalamin ([employer] VITAMIN B12) 1000 MCG tablet Take 1 tablet by mouth daily 30 tablet 3    SUMAtriptan (IMITREX) 50 MG tablet Take 1 tablet by mouth once as needed for Migraine 9 tablet 5     No current facility-administered medications for this visit. ALLERGIES:  Allergies as of 09/23/2022    (No Known Allergies)       Symptoms of decreased mood absent  Symptoms of anhedonia absent    **If either question is answered in a  positive fashion then complete the PHQ9 Scoring Evaluation and make the appropriate referral**      Immunization status: up to date and documented. Gynecologic History:  Menarche: 11 yo  Menopause at [age] yo     No LMP recorded. Patient has had an ablation. Sexually Active: Yes    STD History: Yes genital warts     Permanent Sterilization: Yes TL   Reversible Birth Control: No        Hormone Replacement Exposure: No      Genetic Qualified Family History of Breast, Ovarian , Colon or Uterine Cancer: unsure     If YES see scanned worksheet. Preventative Health Testing:    Health Maintenance: There are no preventive care reminders to display for this patient. Date of Last Pap Smear: 7/2020 neg/neg  Abnormal Pap Smear History: denies  Colposcopy History:   Date of Last Mammogram: 3/18/2019 neg  Date of Last Colonoscopy:   Date of Last Bone Density:      ________________________________________________________________________        REVIEW OF SYSTEMS:    see problem list   A minimum of an eleven point review of systems was completed. Review Of Systems (11 point):  Constitutional: No fever, chills or malaise;  No weight change or fatigue  Head and Eyes: No vision changes, Headache, Dizziness or trauma in last 12 months  ENT ROS: No hearing, Tinnitis, sinus or taste problems  Hematological and Lymphatic ROS:No Lymphoma, Von Willebrand's, Hemophillia or Bleeding History  Psych ROS: No Depression, Homicidal thoughts,suicidal thoughts, or anxiety  Breast ROS: No breast abnormalities or lumps  Respiratory ROS: No SOB, Pneumoniae,Cough, or Pulmonary Embolism   Cardiovascular ROS: No Chest Pain with Exertion, Palpitations, Syncope, Edema, Arrhythmia  Gastrointestinal ROS: No Indigestion, Heartburn, Nausea, vomiting, Diarrhea, Constipation,or Bowel Changes; No Bloody Stools or melena  Genito-Urinary ROS: No Dysuria, Hematuria or Nocturia. No Urinary Incontinence or Vaginal Discharge  Musculoskeletal ROS: No Arthralgia, Arthritis,Gout,Osteoporosis or Rheumatism  Neurological ROS: No CVA, Migraines, Epilepsy, Seizure Hx, or Limb Weakness  Dermatological ROS: No Rash, Itching, Hives, Mole Changes or Cancer                                                                                                                                                                                                                                  PHYSICAL Exam:     Constitutional:  Vitals:    09/23/22 1220   BP: 124/82   Site: Left Upper Arm   Position: Sitting   Cuff Size: Medium Adult   Weight: 188 lb (85.3 kg)   Height: 5' 1\" (1.549 m)       Chaperone for Intimate Exam  Chaperone was offered and accepted as part of the rooming process. Chaperone: Joaquina CARO          General Appearance: This  is a well Developed, well Nourished, well groomed female. Her BMI was reviewed. Nutritional decision making was discussed. Skin:  There was a Normal Inspection of the skin without rashes or lesions. There were no rashes. (Papular, Maculopapular, Hives, Pustular, Macular)     There were no lesions (Ulcers, Erythema, Abn. Appearing Nevi)            Lymphatic:  No Lymph Nodes were Palpable in the neck , axilla or groin.  0 # Of Lymph Nodes;  Location ; Character [Normal]  [Shotty] [Tender] [Enlarged]     Neck and EENT:  The neck was supple. There were no masses   The thyroid was not enlarged and had no masses. Perrla, EOMI B/L, TMI B/L No Abnormalities. Throat inspected-No exudates or Masses, Nares Patent No Masses        Respiratory: The lungs were auscultated and found to be clear. There were no rales, rhonchi or wheezes. There was a good respiratory effort. Cardiovascular: The heart was in a regular rate and rhythm. . No S3 or S4. There was no murmur appreciated. Location, grade, and radiation are not applicable. Extremities: The patients extremities were without calf tenderness, edema, or varicosities. There was full range of motion in all four extremities. Pulses in all four extremities were appreciated and are 2/4. Abdomen: The abdomen was soft and non-tender. There were good bowel sounds in all quadrants and there was no guarding, rebound or rigidity. On evaluation there was no evidence of hepatosplenomegaly and there was no costal vertebral valerie tenderness bilaterally. No hernias were appreciated. Abdominal Scars: none    Psych: The patient had a normal Orientation to: Time, Place, Person, and Situation  There is no Mood / Affect changes    Breast:  (Chest)  normal appearance, no masses or tenderness, Inspection negative, No nipple retraction or dimpling, No nipple discharge or bleeding, No axillary or supraclavicular adenopathy, Normal to palpation without dominant masses  Self breast exams were reviewed in detail. Literature was given. Pelvic Exam:  External genitalia: normal general appearance  Urinary system: urethral meatus normal  Vaginal: normal mucosa without prolapse or lesions  Cervix: normal appearance  Adnexa: normal bimanual exam  Uterus: normal single, nontender    Rectal Exam:  exam declined by patient          Musculosk:  Normal Gait and station was noted.   Digits were evaluated without abnormal findings. Range of motion, stability and strength were evaluated and found to be appropriate for the patients age. ASSESSMENT:      49 y.o. Annual   Diagnosis Orders   1. Well woman exam with routine gynecological exam  PAP Smear             Chief Complaint   Patient presents with    Annual Exam          Past Medical History:   Diagnosis Date    Allergic rhinitis due to allergen 5/9/2022    Anxiety 9/20/2019    Asthma     Current mild episode of major depressive disorder without prior episode ([facility] [address] 2/13/2020    Dyslipidemia (high LDL; low HDL) 5/22/2017    History of endometrial ablation     History of kidney stones 5/22/2017    History of tubal ligation     Hyperglycemia 5/22/2017    Hypertriglyceridemia 5/22/2017    Hypomagnesemia 2/14/2020    Migraine without aura and without status migrainosus, not intractable 5/9/2022    Obesity (BMI 30-39. 9) 5/22/2017    Pre-syncope 2/13/2020    Vision abnormalities     glasses/contacts         Patient Active Problem List    Diagnosis Date Noted    Genital warts 09/23/2022     Priority: Medium    Vitamin D deficiency 08/23/2022     Priority: Medium    Migraine without aura and without status migrainosus, not intractable 05/09/2022     Priority: Medium    Allergic rhinitis due to allergen 05/09/2022     Priority: Medium    Obesity (BMI 30.0-34.9) 05/09/2022     Priority: Medium    Mild intermittent asthma without complication 50/31/8922     Priority: Medium    Vitamin B 12 deficiency 08/08/2021    Status post appendectomy 08/05/2021    Enlarged tonsils 11/03/2020    History of uterine fibroid 06/29/2020    Family history of premature CAD 02/21/2020    Heart palpitations 02/13/2020    Chronic fatigue 02/13/2020    Hot flashes 02/13/2020    Major depressive disorder with single episode, in full remission ([facility] [address] 02/13/2020    Abnormal EKG 02/13/2020    Anxiety 09/20/2019    History of colon polyps 03/10/2019    Slow transit constipation 03/10/2019    Asthma, exercise induced 10/19/2018    Snoring 07/02/2018    History of endometrial ablation 05/22/2017     2001      Hyperlipidemia with target LDL less than 100 05/22/2017    Hyperglycemia 05/22/2017    Prediabetes 05/22/2017    Severe obesity (BMI 35.0-35.9 with comorbidity) ([facility] [address] 05/22/2017    History of kidney stones 05/22/2017    Herpes simplex type 1 infection 05/23/2016 5/23/2016 + herpes simplex type I IgG antibody      History of tubal ligation     Vision abnormalities      glasses/contacts            Hereditary Breast, Ovarian, Colon and Uterine Cancer screening Done. Tobacco & Secondary smoke risks reviewed; instructed on cessation and avoidance      Counseling Completed:  Preventative Health Recommendations and Follow up. The patient was informed of the recommended preventative health recommendations. 1. Annuals every year; Cytology collections per prevailing guidelines. 2. Mammograms begin every year at 37 yo if no abnormalities are found and no family history. 3. Bone density studies every 2-3 years. Begin at 71 yo. If no fracture history or osteoporosis family history. (significant). 4. Colonoscopy begin at 38 yo. Repeat every ten years if negative and no family history. 5. Calcium of 6880-0742 mg/day in split dosing  6. Vitamin D 400-800 IU/day  7. All other preventative health recommendations will be managed by the patients Primary care physician. PLAN:  Return in about 1 year (around 9/23/2023) for annual.  Pap smear collected  Rx for genital warts   Repeat Annual every 1 year  Cervical Cytology Evaluation begins at 24years old. If Negative Cytology, Follow-up screening per current guidelines. Mammograms every 1 year. If 37 yo and last mammogram was negative. Calcium and Vitamin D dosing reviewed. Colonoscopy screening reviewed as well as onset for bone density testing. Birth control and barrier recommendations discussed. STD counseling and prevention reviewed.   Gardisil counseling completed for all patients 10-37 yo. Routine health maintenance per patients PCP. Orders Placed This Encounter   Procedures    PAP Smear     Patient History:    No LMP recorded. Patient has had an ablation. OBGYN Status: Ablation  Past Surgical History:  07/31/2021: APPENDECTOMY      Comment:  [facility]  No date: COLONOSCOPY  06/28/2019: COLONOSCOPY  06/28/2019: COLONOSCOPY; N/A      Comment:  COLORECTAL CANCER SCREENING, NOT HIGH RISK performed by                Cristiano Sandy MD at [address] [place] [address]  No date: ENDOMETRIAL ABLATION  No date: ENDOMETRIAL ABLATION  No date: LITHOTRIPSY  No date: TUBAL LIGATION  No date: TUBAL LIGATION  No date: WISDOM TOOTH EXTRACTION      Social History    Tobacco Use      Smoking status: Never      Smokeless tobacco: Never      Tobacco comments: Never smoked       Standing Status:   Future     Standing Expiration Date:   9/23/2023     Order Specific Question:   Collection Type     Answer: Thin Prep     Order Specific Question:   Prior Abnormal Pap Test     Answer:   No     Order Specific Question:   Screening or Diagnostic     Answer:   Screening     Order Specific Question:   HPV Requested? Answer:   Yes     Order Specific Question:   High Risk Patient     Answer:   N/A           The patient, Rajiv Hernandez is a 49 y.o. female, was seen with a total time spent of 30 minutes for the visit on this date of service by the E/M provider. The time component had both face to face and non face to face time spent in determining the total time component. Counseling and education regarding her diagnosis listed below and her options regarding those diagnoses were also included in determining her time component. Diagnosis Orders   1. Well woman exam with routine gynecological exam  PAP Smear           The patient had her preventative health maintenance recommendations and follow-up reviewed with her at the completion of her visit.

## 2022-10-05 LAB — CYTOLOGY REPORT: NORMAL

## 2022-10-12 ENCOUNTER — NURSE ONLY (OUTPATIENT)
Dept: FAMILY MEDICINE CLINIC | Age: 47
End: 2022-10-12
Payer: COMMERCIAL

## 2022-10-12 DIAGNOSIS — Z11.1 ENCOUNTER FOR PPD TEST: Primary | ICD-10-CM

## 2022-10-12 PROCEDURE — 86580 TB INTRADERMAL TEST: CPT | Performed by: FAMILY MEDICINE

## 2022-10-14 ENCOUNTER — NURSE ONLY (OUTPATIENT)
Dept: FAMILY MEDICINE CLINIC | Age: 47
End: 2022-10-14

## 2022-10-14 ENCOUNTER — PATIENT MESSAGE (OUTPATIENT)
Dept: FAMILY MEDICINE CLINIC | Age: 47
End: 2022-10-14

## 2022-10-14 DIAGNOSIS — Z11.1 ENCOUNTER FOR PPD SKIN TEST READING: Primary | ICD-10-CM

## 2022-10-14 DIAGNOSIS — J45.20 MILD INTERMITTENT ASTHMA WITHOUT COMPLICATION: Primary | ICD-10-CM

## 2022-10-14 DIAGNOSIS — J45.990 ASTHMA, EXERCISE INDUCED: ICD-10-CM

## 2022-10-14 RX ORDER — ALBUTEROL SULFATE 90 UG/1
2 AEROSOL, METERED RESPIRATORY (INHALATION) EVERY 6 HOURS PRN
Qty: 8 G | Refills: 3 | Status: SHIPPED | OUTPATIENT
Start: 2022-10-14

## 2022-10-14 RX ORDER — FLUTICASONE PROPIONATE 44 UG/1
2 AEROSOL, METERED RESPIRATORY (INHALATION) 2 TIMES DAILY
Qty: 10.6 G | Refills: 3 | Status: SHIPPED | OUTPATIENT
Start: 2022-10-14

## 2022-10-14 NOTE — PROGRESS NOTES
PPD Reading Note  PPD read and results entered in [address]. Result: 0.00 mm induration.   Interpretation: NEGATIVE  If test not read within 48-72 hours of initial placement, patient advised to repeat in other arm 1-3 weeks after this test.  Allergic reaction: no

## 2022-10-14 NOTE — TELEPHONE ENCOUNTER
From: Mercedes Friend  To: Dr. Russell Divers: 10/14/2022 2:24 PM EDT  Subject: Inhaler    Hello. Can I get an inhaler called in today at the pharm? Thank you.

## 2022-10-19 ENCOUNTER — NURSE ONLY (OUTPATIENT)
Dept: FAMILY MEDICINE CLINIC | Age: 47
End: 2022-10-19
Payer: COMMERCIAL

## 2022-10-19 DIAGNOSIS — Z11.1 ENCOUNTER FOR PPD TEST: Primary | ICD-10-CM

## 2022-10-19 PROCEDURE — 86580 TB INTRADERMAL TEST: CPT | Performed by: FAMILY MEDICINE

## 2022-10-21 ENCOUNTER — NURSE ONLY (OUTPATIENT)
Dept: FAMILY MEDICINE CLINIC | Age: 47
End: 2022-10-21

## 2022-11-09 ENCOUNTER — PATIENT MESSAGE (OUTPATIENT)
Dept: FAMILY MEDICINE CLINIC | Age: 47
End: 2022-11-09

## 2022-11-09 DIAGNOSIS — R73.03 PREDIABETES: Primary | ICD-10-CM

## 2022-11-11 RX ORDER — TIRZEPATIDE 2.5 MG/.5ML
2.5 INJECTION, SOLUTION SUBCUTANEOUS WEEKLY
Qty: 2 ML | Refills: 3 | Status: SHIPPED | OUTPATIENT
Start: 2022-11-11

## 2022-11-11 NOTE — TELEPHONE ENCOUNTER
From: Apryl Joe  To: Dr. Vallecillo Pin: 11/9/2022 2:55 PM EST  Subject: Insulin    Hello. I was in the trulicity(dulaglutide). After taking this for the weeks that I had it I noticed that I was feeling neusea, constipated, and some acid reflex. I was wondering if I could try Mounjaro .  Pharmacy is [facility]

## 2022-11-18 DIAGNOSIS — E55.9 VITAMIN D DEFICIENCY: ICD-10-CM

## 2022-11-18 RX ORDER — ERGOCALCIFEROL 1.25 MG/1
CAPSULE ORAL
Qty: 12 CAPSULE | Refills: 0 | Status: SHIPPED | OUTPATIENT
Start: 2022-11-18

## 2022-12-02 ENCOUNTER — PATIENT MESSAGE (OUTPATIENT)
Dept: FAMILY MEDICINE CLINIC | Age: 47
End: 2022-12-02

## 2022-12-02 DIAGNOSIS — M54.2 NECK PAIN: ICD-10-CM

## 2022-12-02 RX ORDER — BACLOFEN 10 MG/1
10 TABLET ORAL 3 TIMES DAILY PRN
Qty: 90 TABLET | Refills: 1 | Status: SHIPPED | OUTPATIENT
Start: 2022-12-02

## 2022-12-02 NOTE — TELEPHONE ENCOUNTER
From: Shaan Duty  To: Dr. Lyn Cornelius: 12/2/2022 12:09 AM EST  Subject: Refil    Hello. I had a prescription for muscle relaxers that I would like a refill on (bacolfen 10mg). Those work ok but I was wondering if u had something a little better. They work ok and if I have to stick with them thats fine. Thank you for your time hope u have a great day.

## 2022-12-06 ENCOUNTER — PATIENT MESSAGE (OUTPATIENT)
Dept: OBGYN CLINIC | Age: 47
End: 2022-12-06

## 2022-12-06 DIAGNOSIS — N76.0 ACUTE VAGINITIS: ICD-10-CM

## 2022-12-06 RX ORDER — FLUCONAZOLE 150 MG/1
150 TABLET ORAL ONCE
Qty: 2 TABLET | Refills: 0 | Status: SHIPPED | OUTPATIENT
Start: 2022-12-06 | End: 2022-12-06

## 2022-12-06 RX ORDER — METRONIDAZOLE 500 MG/1
500 TABLET ORAL 2 TIMES DAILY
Qty: 14 TABLET | Refills: 0 | Status: SHIPPED | OUTPATIENT
Start: 2022-12-06 | End: 2022-12-13

## 2022-12-06 NOTE — TELEPHONE ENCOUNTER
From: Henny Duran  To: Lake Rinaldi  Sent: 12/6/2022 9:49 AM EST  Subject: In need of meds    I am currently experiencing , itchiness, and some white discharge, and a lil bit of a fishy smell. Not sure if BV or yeast infection Do I need to come in or can I get something called in for me ?   Thank you

## 2022-12-22 ENCOUNTER — PATIENT MESSAGE (OUTPATIENT)
Dept: FAMILY MEDICINE CLINIC | Age: 47
End: 2022-12-22

## 2022-12-22 DIAGNOSIS — T30.0 BURN: Primary | ICD-10-CM

## 2022-12-22 NOTE — TELEPHONE ENCOUNTER
From: Suzanne Blanco  To: Dr. Duong Ripa: 12/22/2022 8:43 AM EST  Subject: Burn Iveth Rom     Kmlo. I had some really bad cramps he last 2 days and I fell asleep with my heating pad on my stomach. I have a blister the size of a kimberyl on my stomach. Do you recommend letting it pop or go away on its own? And what can I put on this ? Thank you.      HAPPY HOLIDAYS!!!

## 2023-01-05 ENCOUNTER — TELEPHONE (OUTPATIENT)
Dept: NEUROLOGY | Age: 48
End: 2023-01-05

## 2023-01-05 ENCOUNTER — E-VISIT (OUTPATIENT)
Dept: FAMILY MEDICINE CLINIC | Age: 48
End: 2023-01-05
Payer: COMMERCIAL

## 2023-01-05 DIAGNOSIS — G43.009 MIGRAINE WITHOUT AURA AND WITHOUT STATUS MIGRAINOSUS, NOT INTRACTABLE: ICD-10-CM

## 2023-01-05 PROCEDURE — 99422 OL DIG E/M SVC 11-20 MIN: CPT | Performed by: FAMILY MEDICINE

## 2023-01-05 RX ORDER — SUMATRIPTAN 50 MG/1
50 TABLET, FILM COATED ORAL
Qty: 9 TABLET | Refills: 5 | Status: SHIPPED | OUTPATIENT
Start: 2023-01-05 | End: 2023-01-05

## 2023-01-05 RX ORDER — ONDANSETRON 4 MG/1
4 TABLET, FILM COATED ORAL EVERY 6 HOURS PRN
Qty: 24 TABLET | Refills: 0 | Status: SHIPPED | OUTPATIENT
Start: 2023-01-05 | End: 2023-01-11

## 2023-01-05 RX ORDER — MULTIVITAMIN/IRON/FOLIC ACID 18MG-0.4MG
250 TABLET ORAL DAILY
Qty: 90 TABLET | Refills: 3 | Status: SHIPPED | OUTPATIENT
Start: 2023-01-05

## 2023-01-05 NOTE — PROGRESS NOTES
Celia Baez (1975) initiated an asynchronous digital communication through [employer]. Date of service: 1/5/2023     HPI: per patient's questionnaire      Addendum made on 1/5/2023 at 6:23 PM  --Tona Jarvis MD on 1/5/2023 at 6:23 PM    Omg, Daisy sorry for the typo, yes ear pain. I have ear drops. It is called neomycin and polymyxin  10ml. Is this an antibiotic ear drop? Thank you. Why would I be getting this air pain? Ive done the AirDrop so that seems like it doesnt help that issue. An electronic signature was used to authenticate this note. Electronically signed by Tona Jarvis MD on 1/5/2023 at 6:23 PM      EXAM: not applicable    Diagnoses and all orders for this visit:    1. Migraine without aura and without status migrainosus, not intractable  Failing to improve  - ondansetron (ZOFRAN) 4 MG tablet; Take 1 tablet by mouth every 6 hours as needed for Nausea or Vomiting  Dispense: 24 tablet; Refill: 0  - Magnesium Oxide (MAGNESIUM-OXIDE) 250 MG TABS tablet; Take 1 tablet by mouth daily Take with food, in the evening, preventative for headaches  Dispense: 90 tablet; Refill: 3  - Riboflavin 100 MG TABS; Take 100 mg by mouth daily Preventative for headaches  Dispense: 90 tablet; Refill: 3  - SUMAtriptan (IMITREX) 50 MG tablet; Take 1 tablet by mouth once as needed for Migraine  Dispense: 9 tablet; Refill: 5      No orders of the defined types were placed in this encounter. Patient was advised to contact PCP if symptoms worsen or failing to change as expected        11-20 minutes were spent on the digital evaluation and management of this patient.   Electronically signed by Tona Jarvis MD on 1/5/23 at  12:22 PM

## 2023-01-05 NOTE — TELEPHONE ENCOUNTER
Celia called the office this morning and left a message on the clinical line asking for a return call regarding her migraines. Call placed back to Celia this afternoon. Patient stated that she has had a migraine for the past three days. She is also having ear pain, pain in her left cheek area and around the eye. Patient stated that she is supposed to have an E visit with her PCP sometime today. Patient denied using Amitriptyline, stating that she only uses it intermittently, fearing a possible side effect of a seizure. She continues to use Magnesium daily. I explained to [address] [place] [address] that the Amitriptyline needs to be taken on a regular basis to work correctly for headache prevention. Celia stated that she would discuss this further with her PCP. She made a follow up with Dr. Cecilia Ball in April.

## 2023-01-24 RX ORDER — FLUCONAZOLE 150 MG/1
TABLET ORAL
Qty: 2 TABLET | Refills: 0 | Status: SHIPPED | OUTPATIENT
Start: 2023-01-24

## 2023-01-26 ENCOUNTER — OFFICE VISIT (OUTPATIENT)
Dept: OBGYN CLINIC | Age: 48
End: 2023-01-26
Payer: COMMERCIAL

## 2023-01-26 ENCOUNTER — HOSPITAL ENCOUNTER (OUTPATIENT)
Age: 48
Setting detail: SPECIMEN
Discharge: HOME | End: 2023-01-26

## 2023-01-26 ENCOUNTER — TELEPHONE (OUTPATIENT)
Dept: OBGYN CLINIC | Age: 48
End: 2023-01-26

## 2023-01-26 VITALS
WEIGHT: 192 LBS | SYSTOLIC BLOOD PRESSURE: 124 MMHG | BODY MASS INDEX: 36.25 KG/M2 | DIASTOLIC BLOOD PRESSURE: 80 MMHG | HEIGHT: 61 IN

## 2023-01-26 DIAGNOSIS — R10.2 PELVIC PAIN: Primary | ICD-10-CM

## 2023-01-26 DIAGNOSIS — R10.2 PELVIC PAIN: ICD-10-CM

## 2023-01-26 DIAGNOSIS — Z12.31 ENCOUNTER FOR SCREENING MAMMOGRAM FOR MALIGNANT NEOPLASM OF BREAST: ICD-10-CM

## 2023-01-26 LAB
AMORPHOUS: ABNORMAL
BILIRUBIN URINE: NEGATIVE
CANDIDA SPECIES, DNA PROBE: NEGATIVE
COLOR: ABNORMAL
EPITHELIAL CELLS UA: ABNORMAL /HPF (ref 0–5)
GARDNERELLA VAGINALIS, DNA PROBE: NEGATIVE
GLUCOSE URINE: NEGATIVE
KETONES, URINE: NEGATIVE
LEUKOCYTE ESTERASE, URINE: NEGATIVE
MUCUS: ABNORMAL
NITRITE, URINE: NEGATIVE
PH UA: 5.5 (ref 5–8)
PROTEIN UA: NEGATIVE
RBC UA: ABNORMAL /HPF (ref 0–2)
SOURCE: NORMAL
SPECIFIC GRAVITY UA: 1.03 (ref 1–1.03)
TRICHOMONAS VAGINALIS DNA: NEGATIVE
TURBIDITY: ABNORMAL
URINE HGB: ABNORMAL
UROBILINOGEN, URINE: NORMAL
WBC UA: ABNORMAL /HPF (ref 0–5)

## 2023-01-26 PROCEDURE — 99213 OFFICE O/P EST LOW 20 MIN: CPT | Performed by: NURSE PRACTITIONER

## 2023-01-26 RX ORDER — CIPROFLOXACIN 250 MG/1
250 TABLET, FILM COATED ORAL 2 TIMES DAILY
Qty: 20 TABLET | Refills: 0 | Status: SHIPPED | OUTPATIENT
Start: 2023-01-26 | End: 2023-02-05

## 2023-01-26 RX ORDER — AMOXICILLIN 875 MG/1
TABLET, COATED ORAL
COMMUNITY
Start: 2023-01-24

## 2023-01-26 NOTE — TELEPHONE ENCOUNTER
----- Message from MATT France NP sent at 1/26/2023  3:30 PM EST -----  + UTI  Cipro 250 mg PO BID x 10 days  May need to change once C&S results  Increase water intake to 8-10 glasses per day

## 2023-01-26 NOTE — PROGRESS NOTES
Celia Kaba  2023    YOB: 1975          The patient was seen today. She is here regarding pelvic pain/cramping with menses. Hx ablation and stated until recently she only had spotting with menses. Now menses are moderate to light flow with intense \"contraction\" type pain. Rating a 10/10 when having pain. States she used a heating pad last month for the pain and it burnt her skin . Her bowels are regular and she is voiding without difficulty. HPI:  Ara Phillpis is a 49 y.o. female [id number] pelvic pain/ cramping with menses      OB History    Para Term  AB Living   5 4 4 0 1 4   SAB IAB Ectopic Molar Multiple Live Births   0 1 0 0 0 4      # Outcome Date GA Lbr Robert/2nd Weight Sex Delivery Anes PTL Lv   5 IAB        N    4 Term      Vag-Spont  N MONTY   3 Term      Vag-Spont  N MONTY   2 Term      Vag-Spont  N MONTY   1 Term      Vag-Spont  N MONTY       Past Medical History:   Diagnosis Date    Allergic rhinitis due to allergen 2022    Anxiety 2019    Asthma     Current mild episode of major depressive disorder without prior episode ([facility] [address] 2020    Dyslipidemia (high LDL; low HDL) 2017    History of endometrial ablation     History of kidney stones 2017    History of tubal ligation     Hyperglycemia 2017    Hypertriglyceridemia 2017    Hypomagnesemia 2020    Migraine without aura and without status migrainosus, not intractable 2022    Obesity (BMI 30-39. 9) 2017    Pre-syncope 2020    Vision abnormalities     glasses/contacts       Past Surgical History:   Procedure Laterality Date    APPENDECTOMY  2021    [facility]    COLONOSCOPY      COLONOSCOPY  2019    COLONOSCOPY N/A 2019    COLORECTAL CANCER SCREENING, NOT HIGH RISK performed by Leonel Palmer MD at [address] [place] [address] Family History   Problem Relation Age of Onset    Diabetes Father         possible type 1    Heart Attack Father 35    Bipolar Disorder Sister     Lung Cancer Paternal Grandmother     Heart Attack Paternal Grandfather     Colon Cancer Neg Hx     Breast Cancer Neg Hx        Social History     Socioeconomic History    Marital status: Single     Spouse name: Not on file    Number of children: Not on file    Years of education: Not on file    Highest education level: Not on file   Occupational History    Not on file   Tobacco Use    Smoking status: Never    Smokeless tobacco: Never    Tobacco comments:     Never smoked   Vaping Use    Vaping Use: Never used   Substance and Sexual Activity    Alcohol use:  Yes     Alcohol/week: 1.0 standard drink     Types: 1 Drinks containing 0.5 oz of alcohol per week     Comment: Raely drink    Drug use: No    Sexual activity: Yes     Partners: Male     Birth control/protection: Surgical     Comment: TL   Other Topics Concern    Not on file   Social History Narrative    Not on file     Social Determinants of Health     Financial Resource Strain: Not on file   Food Insecurity: Not on file   Transportation Needs: Not on file   Physical Activity: Not on file   Stress: Not on file   Social Connections: Not on file   Intimate Partner Violence: Not on file   Housing Stability: Not on file         MEDICATIONS:  Current Outpatient Medications   Medication Sig Dispense Refill    amoxicillin (AMOXIL) 875 MG tablet       lidocaine (LMX) 4 % cream 5 g topical 2-3 times a day as needed for pain, maximum 20 g/day 120 g 3    Riboflavin 100 MG TABS Take 100 mg by mouth daily Preventative for headaches 90 tablet 3    baclofen (LIORESAL) 10 MG tablet Take 1 tablet by mouth 3 times daily as needed (MUSCLE SPASMS) Causes sedation, do not drive while taking this medication 90 tablet 1    vitamin D (ERGOCALCIFEROL) 1.25 MG (10266 UT) CAPS capsule TAKE ONE CAPSULE BY MOUTH ONCE WEEKLY 12 capsule 0 Tirzepatide (MOUNJARO) 2.5 MG/0.5ML SOPN SC injection Inject 0.5 mLs into the skin once a week Stop Trulicity 2 mL 3    albuterol sulfate HFA (PROAIR HFA) 108 (90 Base) MCG/ACT inhaler Inhale 2 puffs into the lungs every 6 hours as needed for Wheezing or Shortness of Breath (cough) 8 g 3    fluticasone (FLOVENT HFA) 44 MCG/ACT inhaler Inhale 2 puffs into the lungs 2 times daily 10.6 g 3    fluticasone (FLONASE) 50 MCG/ACT nasal spray 2 sprays by Nasal route daily 16 g 3    pravastatin (PRAVACHOL) 40 MG tablet Take 1 tablet by mouth every evening With food, for high cholesterol 90 tablet 3    Insulin Pen Needle (B-D UF III MINI PEN NEEDLES) 31G X 5 MM MISC Weekly with Trulicity 12 each 3    montelukast (SINGULAIR) 10 MG tablet Take 1 tablet by mouth nightly 30 tablet 3    cyanocobalamin ([employer] VITAMIN B12) 1000 MCG tablet Take 1 tablet by mouth daily 30 tablet 3    fluconazole (DIFLUCAN) 150 MG tablet TAKE ONE TABLET BY MOUTH IN A SINGLE DOSE REPEAT DOSE IN SEVEN DAYS (Patient not taking: Reported on 1/26/2023) 2 tablet 0    Magnesium Oxide (MAGNESIUM-OXIDE) 250 MG TABS tablet Take 1 tablet by mouth daily Take with food, in the evening, preventative for headaches (Patient not taking: Reported on 1/26/2023) 90 tablet 3    SUMAtriptan (IMITREX) 50 MG tablet Take 1 tablet by mouth once as needed for Migraine (Patient not taking: Reported on 1/26/2023) 9 tablet 5    silver sulfADIAZINE (SILVADENE) 1 % cream Apply topically daily.  (Patient not taking: Reported on 1/26/2023) 50 g 0    busPIRone (BUSPAR) 5 MG tablet Take 5 mg by mouth 3 times daily (Patient not taking: Reported on 1/26/2023)      amitriptyline (ELAVIL) 25 MG tablet Take 1 tablet by mouth nightly (Patient not taking: Reported on 1/26/2023) 90 tablet 1    butalbital-acetaminophen-caffeine (FIORICET, ESGIC) -40 MG per tablet Take 1 tablet by mouth every 6 hours as needed for Headaches or Migraine MAX 3 DAYS/WEEK (Patient not taking: Reported on 1/26/2023) 30 tablet 0     No current facility-administered medications for this visit. ALLERGIES:  Allergies as of 01/26/2023    (No Known Allergies)         REVIEW OF SYSTEMS:    see problem list   A minimum of an eleven point review of systems was completed. Review Of Systems (11 point):  Constitutional: No fever, chills or malaise; No weight change or fatigue  Head and Eyes: No vision, Headache, Dizziness or trauma in last 12 months  ENT ROS: No hearing, Tinnitis, sinus or taste problems  Hematological and Lymphatic ROS:No Lymphoma, Von Willebrand's, Hemophillia or Bleeding History  Psych ROS: No Depression, Homicidal thoughts,suicidal thoughts, or anxiety  Breast ROS: No prior breast abnormalities or lumps  Respiratory ROS: No SOB, Pneumoniae,Cough, or Pulmonary Embolism History  Cardiovascular ROS: No Chest Pain with Exertion, Palpitations, Syncope, Edema, Arrhythmia  Gastrointestinal ROS: No Indigestion, Heartburn, Nausea, vomiting, Diarrhea, Constipation,or Bowel Changes; No Bloody Stools or melena  Genito-Urinary ROS: No Dysuria, Hematuria or Nocturia. No Urinary Incontinence or Vaginal Discharge  Musculoskeletal ROS: No Arthralgia, Arthritis,Gout,Osteoporosis or Rheumatism  Neurological ROS: No CVA, Migraines, Epilepsy, Seizure Hx, or Limb Weakness  Dermatological ROS: No Rash, Itching, Hives, Mole Changes or Cancer          Blood pressure 124/80, height 5' 1\" (1.549 m), weight 192 lb (87.1 kg), last menstrual period 01/19/2023, not currently breastfeeding. Chaperone for Intimate Exam  Chaperone was offered and accepted as part of the rooming process. Chaperone: Matilde ALCAZAR         Abdomen: Soft non-tender; good bowel sounds. No guarding, rebound or rigidity. No CVA tenderness bilaterally. Extremities: No calf tenderness, DTR 2/4, and No edema bilaterally    Pelvic: Vulva and vagina appear normal. Bimanual exam reveals normal uterus and adnexa. Diagnostics:  No results found.     Lab Results:  Results for orders placed or performed during the hospital encounter of 09/23/22   GYN Cytology   Result Value Ref Range    Cytology Report       INTERPRETATION    Cervical material, (ThinPrep vial, Imaging-assisted review):  Specimen Adequacy:       Satisfactory for evaluation.       -Endocervical/transformation zone component is absent. Descriptive Diagnosis:       Negative for intraepithelial lesion or malignancy. Cytotechnologist:   Savanah ALEMAN(ASCP)  **Electronically Signed Out**  ey/10/5/2022        Procedure/Addendum  HPV Procedure Report       Date Ordered:     9/26/2022     Status: Signed Out       Date Complete:     9/27/2022     By: System Interface       Date Reported:     9/27/2022              Sample:  HPV Type 16      Result:   Not Detected      Ref Range:  (Not Detected)  Sample:  HPV Type 18      Result:   Not Detected      Ref Range: (Not  Detected)  Sample: Other High Risk HPV      Result:   Not Detected      Ref  Range: (Not Detected)  Sample:  HPV Interp      Result:         Ref Range: (Not Detected)  This test amplifies and detects DNA of 14 high-risk HPV types  associated with cervical cance r and its precursor lesions   (HPV types 16,18, 31, 33, 35, 39, 45, 51, 52, 56, 58, 59, 66, and  68). Sensitivity may be affected by specimen collection methods, stage of  infection, and the presence of interfering   substances. Results should be interpreted in conjunction with other  available laboratory and clinical data. A negative   high-risk HPV result does not exclude the possibility of future  cytologic HSIL or underlying CIN2-3 or cancer. This test is intended for medical purposes only and is not valid for  the evaluation of suspected sexual abuse or for   other forensic purposes.                Source:  A: Cervical material, (ThinPrep vial, Imaging-assisted review)    Clinical History  Endometrial ablation  Z01.419 Routine gyn exam without abnormal findings  Co-Test:  ThinPrep Pap with high risk HPV testing    GYNECOLOGIC CYTOLOGY REPORT    Patient Name: Sherry Garcia: [id number]  Path Number: [id number]  [address] [facility],  [address]  [phone]  Fax: [phone]     Human papillomavirus (HPV) DNA probe thin prep high risk   Result Value Ref Range    Specimen Description . GENITAL - NOT SPECIFIED     HPV Sample . THIN PREP     HPV, Genotype 16 Not Detected Not Detected    HPV, Genotype 18 Not Detected Not Detected    HPV, High Risk Other Not Detected Not Detected    HPV, Interpretation               Assessment:   Diagnosis Orders   1. Pelvic pain  Vaginitis DNA Probe    C.trachomatis N.gonorrhoeae DNA    Urinalysis with Reflex to Culture    US PELVIS COMPLETE    US NON OB TRANSVAGINAL      2.  Encounter for screening mammogram for malignant neoplasm of breast  BOYD SHAHID DIGITAL SCREEN BILATERAL        Patient Active Problem List    Diagnosis Date Noted    Genital warts 2022     Priority: Medium    Vitamin D deficiency 2022     Priority: Medium    Migraine without aura and without status migrainosus, not intractable 2022     Priority: Medium    Allergic rhinitis due to allergen 2022     Priority: Medium    Obesity (BMI 30.0-34.9) 2022     Priority: Medium    Mild intermittent asthma without complication      Priority: Medium    Vitamin B 12 deficiency 2021    Status post appendectomy 2021    Enlarged tonsils 2020    History of uterine fibroid 2020    Family history of premature CAD 2020    Heart palpitations 2020    Chronic fatigue 2020    Hot flashes 2020    Major depressive disorder with single episode, in full remission ([facility] [address] 2020    Abnormal EKG 2020    Anxiety 2019    History of colon polyps 03/10/2019    Slow transit constipation 03/10/2019 Asthma, exercise induced 10/19/2018    Snoring 07/02/2018    History of endometrial ablation 05/22/2017     2001      Hyperlipidemia with target LDL less than 100 05/22/2017    Hyperglycemia 05/22/2017    Prediabetes 05/22/2017    Severe obesity (BMI 35.0-35.9 with comorbidity) ([facility] [address] 05/22/2017    History of kidney stones 05/22/2017    Herpes simplex type 1 infection 05/23/2016 5/23/2016 + herpes simplex type I IgG antibody      History of tubal ligation     Vision abnormalities      glasses/contacts             PLAN:  Return in about 4 weeks (around 2/23/2023) for follow up with physician. Pelvic u/s ordered  Vaginal cultures collected  UA ordered  Mammogram ordered   Repeat Annual every 1 year  Cervical Cytology Evaluation begins at 23years old. If Negative Cytology, Follow-up screening per current guidelines. Return to the office in 4 weeks. Counseled on preventative health maintenance follow-up. Orders Placed This Encounter   Procedures    Vaginitis DNA Probe     Standing Status:   Future     Standing Expiration Date:   1/26/2024    C.trachomatis N.gonorrhoeae DNA     Standing Status:   Future     Standing Expiration Date:   7/26/2023    BOYD SHAHID DIGITAL SCREEN BILATERAL     Standing Status:   Future     Standing Expiration Date:   1/26/2024     Order Specific Question:   Reason for exam:     Answer:   screening for malignant neoplasm of breast    US PELVIS COMPLETE     Standing Status:   Future     Standing Expiration Date:   4/26/2023     Order Specific Question:   Reason for exam:     Answer:   pelvic pain    US NON OB TRANSVAGINAL     Standing Status:   Future     Standing Expiration Date:   7/26/2023     Order Specific Question:   Reason for exam:     Answer:   pelvic pain    Urinalysis with Reflex to Culture     Standing Status:   Future     Standing Expiration Date:   1/26/2024     Order Specific Question:   SPECIFY(EX-CATH,MIDSTREAM,CYSTO,ETC)?      Answer:   midstream           The patient, Zee Rubio is a 49 y.o. female, was seen with a total time spent of 20 minutes for the visit on this date of service by the E/M provider. The time component had both face to face and non face to face time spent in determining the total time component. Counseling and education regarding her diagnosis listed below and her options regarding those diagnoses were also included in determining her time component. Diagnosis Orders   1. Pelvic pain  Vaginitis DNA Probe    C.trachomatis N.gonorrhoeae DNA    Urinalysis with Reflex to Culture    US PELVIS COMPLETE    US NON OB TRANSVAGINAL      2. Encounter for screening mammogram for malignant neoplasm of breast  BOYD SHAHID DIGITAL SCREEN BILATERAL           The patient had her preventative health maintenance recommendations and follow-up reviewed with her at the completion of her visit.

## 2023-01-27 LAB
C TRACH DNA GENITAL QL NAA+PROBE: NEGATIVE
N. GONORRHOEAE DNA: NEGATIVE
SPECIMEN DESCRIPTION: NORMAL

## 2023-02-22 ENCOUNTER — HOSPITAL ENCOUNTER (OUTPATIENT)
Dept: MAMMOGRAPHY | Age: 48
Discharge: HOME | End: 2023-02-24

## 2023-02-22 DIAGNOSIS — Z12.31 ENCOUNTER FOR SCREENING MAMMOGRAM FOR MALIGNANT NEOPLASM OF BREAST: ICD-10-CM

## 2023-02-23 ENCOUNTER — PROCEDURE VISIT (OUTPATIENT)
Dept: OBGYN CLINIC | Age: 48
End: 2023-02-23

## 2023-02-23 DIAGNOSIS — R10.2 PELVIC PAIN: ICD-10-CM

## 2023-03-20 DIAGNOSIS — J45.990 ASTHMA, EXERCISE INDUCED: ICD-10-CM

## 2023-03-20 DIAGNOSIS — E55.9 VITAMIN D DEFICIENCY: ICD-10-CM

## 2023-03-20 DIAGNOSIS — R73.03 PREDIABETES: ICD-10-CM

## 2023-03-20 DIAGNOSIS — J45.20 MILD INTERMITTENT ASTHMA WITHOUT COMPLICATION: ICD-10-CM

## 2023-03-21 RX ORDER — TIRZEPATIDE 2.5 MG/.5ML
2.5 INJECTION, SOLUTION SUBCUTANEOUS WEEKLY
Qty: 2 ML | Refills: 3 | Status: SHIPPED | OUTPATIENT
Start: 2023-03-21

## 2023-03-21 RX ORDER — ERGOCALCIFEROL 1.25 MG/1
50000 CAPSULE ORAL WEEKLY
Qty: 12 CAPSULE | Refills: 0 | Status: SHIPPED | OUTPATIENT
Start: 2023-03-21

## 2023-03-21 RX ORDER — ALBUTEROL SULFATE 90 UG/1
2 AEROSOL, METERED RESPIRATORY (INHALATION) EVERY 6 HOURS PRN
Qty: 8 G | Refills: 3 | Status: SHIPPED | OUTPATIENT
Start: 2023-03-21

## 2023-03-24 ENCOUNTER — TELEMEDICINE (OUTPATIENT)
Dept: OBGYN CLINIC | Age: 48
End: 2023-03-24
Payer: COMMERCIAL

## 2023-03-24 ENCOUNTER — HOSPITAL ENCOUNTER (OUTPATIENT)
Age: 48
Discharge: HOME | End: 2023-03-24
Payer: COMMERCIAL

## 2023-03-24 DIAGNOSIS — N99.85 POST ENDOMETRIAL ABLATION SYNDROME: ICD-10-CM

## 2023-03-24 DIAGNOSIS — N94.6 DYSMENORRHEA: Primary | ICD-10-CM

## 2023-03-24 DIAGNOSIS — N92.6 IRREGULAR MENSES: ICD-10-CM

## 2023-03-24 DIAGNOSIS — R87.616 PAP SMEAR OF CERVIX SATISFACTORY BUT LACKING TRANSFORMATION ZONE: ICD-10-CM

## 2023-03-24 DIAGNOSIS — Z86.018 HISTORY OF UTERINE FIBROID: ICD-10-CM

## 2023-03-24 DIAGNOSIS — N95.1 HOT FLUSHES, PERIMENOPAUSAL: ICD-10-CM

## 2023-03-24 DIAGNOSIS — Z98.51 HISTORY OF TUBAL LIGATION: ICD-10-CM

## 2023-03-24 DIAGNOSIS — D21.9 FIBROID: ICD-10-CM

## 2023-03-24 DIAGNOSIS — Z98.890 HISTORY OF ENDOMETRIAL ABLATION: ICD-10-CM

## 2023-03-24 LAB
ESTRADIOL LEVEL: 54.8 PG/ML (ref 27–314)
FOLLICLE STIMULATING HORMONE: 9.2 MIU/ML (ref 1.7–21.5)

## 2023-03-24 PROCEDURE — 83001 ASSAY OF GONADOTROPIN (FSH): CPT

## 2023-03-24 PROCEDURE — 82670 ASSAY OF TOTAL ESTRADIOL: CPT

## 2023-03-24 PROCEDURE — 99213 OFFICE O/P EST LOW 20 MIN: CPT | Performed by: OBSTETRICS & GYNECOLOGY

## 2023-03-24 PROCEDURE — 36415 COLL VENOUS BLD VENIPUNCTURE: CPT

## 2023-03-24 NOTE — PROGRESS NOTES
5/9/2022    Obesity (BMI 30-39. 9) 5/22/2017    Pre-syncope 2/13/2020    Vision abnormalities     glasses/contacts         Past Surgical History:   Procedure Laterality Date    APPENDECTOMY  07/31/2021    [facility]    COLONOSCOPY      COLONOSCOPY  06/28/2019    COLONOSCOPY N/A 06/28/2019    COLORECTAL CANCER SCREENING, NOT HIGH RISK performed by Gasper Pollock MD at [facility]      LITHOTRIPSY      TUBAL LIGATION      TUBAL LIGATION      WISDOM TOOTH EXTRACTION           Family History   Problem Relation Age of Onset    Diabetes Father         possible type 1    Heart Attack Father 35    Bipolar Disorder Sister     Lung Cancer Paternal Grandmother     Heart Attack Paternal Grandfather     Colon Cancer Neg Hx     Breast Cancer Neg Hx          Social History     Tobacco Use    Smoking status: Never    Smokeless tobacco: Never    Tobacco comments:     Never smoked   Vaping Use    Vaping Use: Never used   Substance Use Topics    Alcohol use:  Yes     Alcohol/week: 1.0 standard drink     Types: 1 Drinks containing 0.5 oz of alcohol per week     Comment: Raely drink    Drug use: No         MEDICATIONS:  Current Outpatient Medications   Medication Sig Dispense Refill    albuterol sulfate HFA (PROAIR HFA) 108 (90 Base) MCG/ACT inhaler Inhale 2 puffs into the lungs every 6 hours as needed for Wheezing or Shortness of Breath (cough) 8 g 3    Tirzepatide (MOUNJARO) 2.5 MG/0.5ML SOPN SC injection Inject 0.5 mLs into the skin once a week Stop Trulicity 2 mL 3    vitamin D (ERGOCALCIFEROL) 1.25 MG (21252 UT) CAPS capsule Take 1 capsule by mouth once a week 12 capsule 0    amoxicillin (AMOXIL) 875 MG tablet       fluconazole (DIFLUCAN) 150 MG tablet TAKE ONE TABLET BY MOUTH IN A SINGLE DOSE REPEAT DOSE IN SEVEN DAYS (Patient not taking: Reported on 1/26/2023) 2 tablet 0    lidocaine (LMX) 4 % cream 5 g topical 2-3 times a day as needed for pain, maximum 20 g/day 120 g 3    Magnesium

## 2023-03-30 ENCOUNTER — PATIENT MESSAGE (OUTPATIENT)
Dept: OBGYN CLINIC | Age: 48
End: 2023-03-30

## 2023-03-31 NOTE — TELEPHONE ENCOUNTER
Called and spoke with patient in regards to her questions, further explained to patient what the upcoming procedure is that she is scheduled for and how it is done. Also spoke with Corrina Hdez about the colonic hydrotherapy and she unfortunately does not have much knowledge on that as it is out of our specialty. Patient encouraged to check with maybe her PCP or her GI dr to see their opinions on that. Patient voiced understanding.

## 2023-03-31 NOTE — TELEPHONE ENCOUNTER
From: Rubio Gamez  To: Dr. Ayanna Hardin: 3/30/2023 5:41 PM EDT  Subject: Medical question     I was wondering what is the difference between embhscope/hysteroscopy ECC and myomectomy? My ultimate goal by having a procedure done is one, bring out of pain, getting rid of a very distended stomach, and getting rid of my fibroids. Will either one of these procedures help me? Also, on a side note-question, I was thinking about getting a colonic hydrotherapy. May I have your professional opinion on this? Please feel free to reply when you have a moment, your time is appreciated.    ~Crystal

## 2023-04-06 ENCOUNTER — E-VISIT (OUTPATIENT)
Dept: FAMILY MEDICINE CLINIC | Age: 48
End: 2023-04-06
Payer: COMMERCIAL

## 2023-04-06 DIAGNOSIS — N39.0 URINARY TRACT INFECTION WITHOUT HEMATURIA, SITE UNSPECIFIED: Primary | ICD-10-CM

## 2023-04-06 PROCEDURE — 99422 OL DIG E/M SVC 11-20 MIN: CPT | Performed by: FAMILY MEDICINE

## 2023-04-06 RX ORDER — FLUCONAZOLE 150 MG/1
150 TABLET ORAL
Qty: 3 TABLET | Refills: 0 | Status: SHIPPED | OUTPATIENT
Start: 2023-04-06

## 2023-04-06 RX ORDER — NITROFURANTOIN 25; 75 MG/1; MG/1
100 CAPSULE ORAL 2 TIMES DAILY
Qty: 10 CAPSULE | Refills: 0 | Status: SHIPPED | OUTPATIENT
Start: 2023-04-06

## 2023-04-06 NOTE — PROGRESS NOTES
Celia Siddiquieen Oliva (1975) initiated an asynchronous digital communication through [email or web address]. Date of service: 4/6/2023     HPI: per patient's questionnaire    EXAM: not applicable    Diagnoses and all orders for this visit:    1. Urinary tract infection without hematuria, site unspecified  Worsening  We will treat empirically for UTI and will give Diflucan for yeast infection, patient gets frequent yeast infections    - nitrofurantoin, macrocrystal-monohydrate, (MACROBID) 100 MG capsule; Take 1 capsule by mouth 2 times daily  Dispense: 10 capsule; Refill: 0  - fluconazole (DIFLUCAN) 150 MG tablet; Take 1 tablet by mouth every 72 hours  Dispense: 3 tablet; Refill: 0      No orders of the defined types were placed in this encounter. Patient was advised to contact PCP if symptoms worsen or failing to change as expected      Time: EV2 - 11-20 minutes were spent on the digital evaluation and management of this patient.      Electronically signed by Isaak Dewitt MD on 4/6/23 at 12:09 PM.

## 2023-05-18 ENCOUNTER — PATIENT MESSAGE (OUTPATIENT)
Dept: FAMILY MEDICINE CLINIC | Age: 48
End: 2023-05-18

## 2023-05-18 DIAGNOSIS — I83.893 VARICOSE VEINS OF BILATERAL LOWER EXTREMITIES WITH OTHER COMPLICATIONS: Primary | ICD-10-CM

## 2023-05-18 NOTE — TELEPHONE ENCOUNTER
From: Gregory Navarrete  To: Dr. Patterson Call: 5/18/2023 12:38 PM EDT  Subject: Spider -varicose veins    Hello. Can you refer me to a doctor who can look at and take care of my varicose veins?  Thank you

## 2023-05-19 ENCOUNTER — PATIENT MESSAGE (OUTPATIENT)
Dept: OBGYN CLINIC | Age: 48
End: 2023-05-19

## 2023-05-22 RX ORDER — FLUCONAZOLE 150 MG/1
150 TABLET ORAL ONCE
Qty: 2 TABLET | Refills: 0 | Status: SHIPPED | OUTPATIENT
Start: 2023-05-22 | End: 2023-05-22

## 2023-05-22 NOTE — TELEPHONE ENCOUNTER
From: Erika Ríos  To: Lynnette Rinaldi  Sent: 5/19/2023 1:30 PM EDT  Subject: Pills not cream     Hello can I get a script  Called in for a yeast infection? Symptoms - itchy & white discharge     Thank you !

## 2023-05-24 ENCOUNTER — OFFICE VISIT (OUTPATIENT)
Dept: NEUROLOGY | Age: 48
End: 2023-05-24
Payer: COMMERCIAL

## 2023-05-24 VITALS
HEIGHT: 60 IN | HEART RATE: 74 BPM | BODY MASS INDEX: 37.3 KG/M2 | WEIGHT: 190 LBS | SYSTOLIC BLOOD PRESSURE: 128 MMHG | DIASTOLIC BLOOD PRESSURE: 89 MMHG

## 2023-05-24 DIAGNOSIS — G47.39 OTHER SLEEP APNEA: ICD-10-CM

## 2023-05-24 DIAGNOSIS — G43.119 INTRACTABLE MIGRAINE WITH AURA WITHOUT STATUS MIGRAINOSUS: Primary | ICD-10-CM

## 2023-05-24 PROCEDURE — 99214 OFFICE O/P EST MOD 30 MIN: CPT | Performed by: STUDENT IN AN ORGANIZED HEALTH CARE EDUCATION/TRAINING PROGRAM

## 2023-05-24 ASSESSMENT — ENCOUNTER SYMPTOMS
EYES NEGATIVE: 1
GASTROINTESTINAL NEGATIVE: 1
RESPIRATORY NEGATIVE: 1

## 2023-05-24 NOTE — PROGRESS NOTES
[facility] NEUROLOGY SPECIALIST  [address]  [address]  Dept: [phone]    PATIENT NAME: Ambreen Perry  PATIENT MRN: [id number]  PRIMARY CARE PHYSICIAN: Isidoro Maurer MD    HPI:      Ambreen Perry is a 51 y.o. female who presents to clinic today for evaluation of Migraine       Interim History:  Patient was last seen on 8/24/22. She was prescribed elavil 25 mg but she noted it made her drowsy and nauseous. She has previously tried Topamax for migraines. She would like to take a more holistic approach and currently does not want to try any medications for migraines. She is taking magnesium oxide for migraine prophylaxis. She has a history of obstructive sleep apnea but is not on therapy. Reviewed prior sleep studies. MRI Brain 9/2022:  IMPRESSION:  1. No acute intracranial abnormality per enhanced brain MRI. No acute  stroke, intracranial hemorrhage or abnormal enhancement. 2.  Small amount of nonspecific fluid in the right mastoid air cells. Prior history:  She notes she has a history of ocular migraines. She would see a kaleidoscope in her peripheral vision without headaches for the past 2 years and they occurred about 2-3 times a year. She notes in May 2022 she started having migraines. The pain is predominantly located in occipital region and radiates to the entire head and retro-orbitally. Usually the headache is not preceded by an aura. Currently, headaches are occurring about 1-2 times a month. She has recently been more stressed out and wakes up with a headache and can last upto 2 days. She is currently having 4 headaches per week now. Patient describes the quality of pain as  combination of sharp and throbbing which varies in intensity 6/10. Associated symptoms include  nausea/vomiting, photophobia, phonophobia, and vertigo. Headaches are typically triggered by stress, bright lights, and caffeine    History of:  Family history of headaches or migraines:

## 2023-06-22 ENCOUNTER — PATIENT MESSAGE (OUTPATIENT)
Dept: FAMILY MEDICINE CLINIC | Age: 48
End: 2023-06-22

## 2023-06-22 DIAGNOSIS — R73.03 PREDIABETES: Primary | ICD-10-CM

## 2023-06-23 NOTE — TELEPHONE ENCOUNTER
From: Arnoldo De Leon  To: Dr. Emanuel Balloon: 6/22/2023 6:55 PM EDT  Subject: Meds    I had my insurance co send a referral for Ozempic.  I would like to switch to that please and thank you

## 2023-06-30 ENCOUNTER — HOSPITAL ENCOUNTER (OUTPATIENT)
Dept: MAMMOGRAPHY | Age: 48
Discharge: HOME | End: 2023-06-30
Payer: COMMERCIAL

## 2023-06-30 PROCEDURE — 77063 BREAST TOMOSYNTHESIS BI: CPT

## 2023-07-05 ENCOUNTER — TELEPHONE (OUTPATIENT)
Dept: OBGYN CLINIC | Age: 48
End: 2023-07-05

## 2023-07-05 DIAGNOSIS — N64.89 BREAST ASYMMETRY: Primary | ICD-10-CM

## 2023-07-05 NOTE — TELEPHONE ENCOUNTER
----- Message from MATT Jacobs CNP sent at 7/3/2023  2:03 PM EDT -----  Asymmetry noted in right breast.  Recommend additional testing with right breast diagnostic mammogram with right breast ultrasound  Please order and let patient know

## 2023-07-06 ENCOUNTER — PATIENT MESSAGE (OUTPATIENT)
Dept: OBGYN CLINIC | Age: 48
End: 2023-07-06

## 2023-07-06 RX ORDER — FLUCONAZOLE 150 MG/1
150 TABLET ORAL
Qty: 2 TABLET | Refills: 0 | Status: SHIPPED | OUTPATIENT
Start: 2023-07-06

## 2023-07-06 NOTE — TELEPHONE ENCOUNTER
From: Valeria Matta  To: Marylee Credit  Sent: 7/6/2023 10:38 AM EDT  Subject: Medicine request     Hello. I feel as if I am getting a yeast Infection. I have redness, itchiness, irritation, and a lil bit of white discharge. Could I please get a script called in for this issue? Thank you in advance.

## 2023-07-14 ENCOUNTER — TELEPHONE (OUTPATIENT)
Dept: VASCULAR SURGERY | Age: 48
End: 2023-07-14

## 2023-07-14 NOTE — TELEPHONE ENCOUNTER
Attempted to contact patient in regards to referral that was received, patient has no VM set up so no message was left, 1st attempt,1st letter sent

## 2023-07-21 DIAGNOSIS — I83.893 VARICOSE VEINS OF LOWER EXTREMITIES WITH COMPLICATIONS, BILATERAL: Primary | ICD-10-CM

## 2023-08-03 ENCOUNTER — OFFICE VISIT (OUTPATIENT)
Dept: FAMILY MEDICINE CLINIC | Age: 48
End: 2023-08-03
Payer: COMMERCIAL

## 2023-08-03 VITALS
HEART RATE: 74 BPM | WEIGHT: 199 LBS | SYSTOLIC BLOOD PRESSURE: 110 MMHG | HEIGHT: 60 IN | DIASTOLIC BLOOD PRESSURE: 70 MMHG | BODY MASS INDEX: 39.07 KG/M2 | OXYGEN SATURATION: 98 %

## 2023-08-03 DIAGNOSIS — E78.5 HYPERLIPIDEMIA WITH TARGET LDL LESS THAN 100: ICD-10-CM

## 2023-08-03 DIAGNOSIS — N64.4 BREAST PAIN IN FEMALE: ICD-10-CM

## 2023-08-03 DIAGNOSIS — E55.9 VITAMIN D DEFICIENCY: ICD-10-CM

## 2023-08-03 DIAGNOSIS — G43.009 MIGRAINE WITHOUT AURA AND WITHOUT STATUS MIGRAINOSUS, NOT INTRACTABLE: Primary | ICD-10-CM

## 2023-08-03 DIAGNOSIS — R73.03 PREDIABETES: ICD-10-CM

## 2023-08-03 DIAGNOSIS — E66.01 SEVERE OBESITY (BMI 35.0-35.9 WITH COMORBIDITY) (HCC): ICD-10-CM

## 2023-08-03 LAB — HBA1C MFR BLD: 5.6 %

## 2023-08-03 PROCEDURE — 99214 OFFICE O/P EST MOD 30 MIN: CPT | Performed by: FAMILY MEDICINE

## 2023-08-03 PROCEDURE — 83037 HB GLYCOSYLATED A1C HOME DEV: CPT | Performed by: FAMILY MEDICINE

## 2023-08-03 RX ORDER — RIMEGEPANT SULFATE 75 MG/75MG
TABLET, ORALLY DISINTEGRATING ORAL
Qty: 30 TABLET | Refills: 1 | Status: SHIPPED | OUTPATIENT
Start: 2023-08-03

## 2023-08-03 SDOH — ECONOMIC STABILITY: FOOD INSECURITY: WITHIN THE PAST 12 MONTHS, YOU WORRIED THAT YOUR FOOD WOULD RUN OUT BEFORE YOU GOT MONEY TO BUY MORE.: NEVER TRUE

## 2023-08-03 SDOH — ECONOMIC STABILITY: HOUSING INSECURITY
IN THE LAST 12 MONTHS, WAS THERE A TIME WHEN YOU DID NOT HAVE A STEADY PLACE TO SLEEP OR SLEPT IN A SHELTER (INCLUDING NOW)?: NO

## 2023-08-03 SDOH — ECONOMIC STABILITY: INCOME INSECURITY: HOW HARD IS IT FOR YOU TO PAY FOR THE VERY BASICS LIKE FOOD, HOUSING, MEDICAL CARE, AND HEATING?: NOT HARD AT ALL

## 2023-08-03 SDOH — ECONOMIC STABILITY: FOOD INSECURITY: WITHIN THE PAST 12 MONTHS, THE FOOD YOU BOUGHT JUST DIDN'T LAST AND YOU DIDN'T HAVE MONEY TO GET MORE.: NEVER TRUE

## 2023-08-03 ASSESSMENT — PATIENT HEALTH QUESTIONNAIRE - PHQ9
SUM OF ALL RESPONSES TO PHQ QUESTIONS 1-9: 0
8. MOVING OR SPEAKING SO SLOWLY THAT OTHER PEOPLE COULD HAVE NOTICED. OR THE OPPOSITE, BEING SO FIGETY OR RESTLESS THAT YOU HAVE BEEN MOVING AROUND A LOT MORE THAN USUAL: 0
5. POOR APPETITE OR OVEREATING: 0
SUM OF ALL RESPONSES TO PHQ QUESTIONS 1-9: 0
SUM OF ALL RESPONSES TO PHQ QUESTIONS 1-9: 0
4. FEELING TIRED OR HAVING LITTLE ENERGY: 0
1. LITTLE INTEREST OR PLEASURE IN DOING THINGS: 0
10. IF YOU CHECKED OFF ANY PROBLEMS, HOW DIFFICULT HAVE THESE PROBLEMS MADE IT FOR YOU TO DO YOUR WORK, TAKE CARE OF THINGS AT HOME, OR GET ALONG WITH OTHER PEOPLE: 0
9. THOUGHTS THAT YOU WOULD BE BETTER OFF DEAD, OR OF HURTING YOURSELF: 0
SUM OF ALL RESPONSES TO PHQ9 QUESTIONS 1 & 2: 0
7. TROUBLE CONCENTRATING ON THINGS, SUCH AS READING THE NEWSPAPER OR WATCHING TELEVISION: 0
2. FEELING DOWN, DEPRESSED OR HOPELESS: 0
3. TROUBLE FALLING OR STAYING ASLEEP: 0
SUM OF ALL RESPONSES TO PHQ QUESTIONS 1-9: 0
6. FEELING BAD ABOUT YOURSELF - OR THAT YOU ARE A FAILURE OR HAVE LET YOURSELF OR YOUR FAMILY DOWN: 0

## 2023-08-03 ASSESSMENT — ENCOUNTER SYMPTOMS
SHORTNESS OF BREATH: 0
ABDOMINAL PAIN: 0
EYE REDNESS: 0
SORE THROAT: 0
ABDOMINAL DISTENTION: 0
COUGH: 0
SINUS PRESSURE: 0
WHEEZING: 0
RHINORRHEA: 0
BLOOD IN STOOL: 0
COLOR CHANGE: 0
TROUBLE SWALLOWING: 0
NAUSEA: 0
RECTAL PAIN: 0
DIARRHEA: 0
VOMITING: 0
STRIDOR: 0
CONSTIPATION: 0
BACK PAIN: 0
CHEST TIGHTNESS: 0

## 2023-08-03 NOTE — PROGRESS NOTES
Visit Information    Have you changed or started any medications since your last visit including any over-the-counter medicines, vitamins, or herbal medicines? no   Are you having any side effects from any of your medications? -  no  Have you stopped taking any of your medications? Is so, why? -  no    Have you seen any other physician or provider since your last visit? No  Have you had any other diagnostic tests since your last visit? No  Have you been seen in the emergency room and/or had an admission to a hospital since we last saw you? No  Have you had your routine dental cleaning in the past 6 months? yes     Have you activated your [employer] account? If not, what are your barriers?  Yes     Patient Care Team:  Abner Quiros MD as PCP - General (Family Medicine)  Abner Quiros MD as PCP - Empaneled Provider  MATT Lerma CNP as Nurse Practitioner (Certified Nurse Practitioner)  MATT Campa CNP (Cardiology)  Clarissa Doshi DO as Consulting Physician (Obstetrics & Gynecology)  Flower Bell MD as Consulting Physician (Neurology)  Yamilex Barth MD as Consulting Physician (Cardiology)    Medical History Review  Past Medical, Family, and Social History reviewed and does contribute to the patient presenting condition    Health Maintenance   Topic Date Due    COVID-19 Vaccine (6 - Booster for Moderna series) 03/24/2022    Depression Monitoring  05/04/2023    Flu vaccine (1) 08/01/2023    A1C test (Diabetic or Prediabetic)  08/26/2023    Lipids  08/26/2023    Cervical cancer screen  09/23/2025    DTaP/Tdap/Td vaccine (2 - Td or Tdap) 02/14/2027    Colorectal Cancer Screen  06/28/2029    Pneumococcal 0-64 years Vaccine  Completed    Hepatitis C screen  Completed    HIV screen  Completed    Hepatitis A vaccine  Aged Out    Hib vaccine  Aged Out    Meningococcal (ACWY) vaccine  Aged Out
ULTRASOUND RM 1 STAZ US STA Radiolog   8/11/2023  3:45 PM [facility]   8/17/2023 12:30 PM [address] [place] [address]   8/29/2023  4:00 PM Jolie Guardado MD Neuro Spec Neurology -   9/15/2023  2:00 PM Ludin Bruce MD [facility]   10/5/2023  9:00 AM DO JUANCARLOS Kuhn [facility]   11/17/2023  3:15 PM [facility]     This note was completed by using the assistance of a speech-recognition program. However, inadvertent computerized transcription errors may be present. Althoughevery effort was made to ensure accuracy, no guarantees can be provided that every mistake has been identified and corrected by editing.   Electronically signed by Jazz Mota MD on 8/3/2023  2:09 PM

## 2023-08-03 NOTE — PATIENT INSTRUCTIONS
Thank you for choosing [facility]) as your healthcare provider as your care is important to us. Please arrive at your appointment on time. If you are unable to make your appointment, please call our office as soon as possible so that we may reschedule your appointment. Missing 3 appointments in a calendar year without notifying the office may lead to dismissal from the practice. We appreciate you calling at least 24 hours in advance, when possible. Thank you. New Updates for [facility]    Thank you for choosing US to give you the best care! [facility]) is always trying to think of new ways to help their patients. We are asking all patients to try out the new digital registration that is now available through your [facility] account Via the joyce you're now able to update your personal and registration information prior to your upcoming appointment. This will save you time once you arrive at the office to check-in, not to mention your information remains safe!! Many other perks come from signing up for an account, such as:  Requesting refills  Scheduling an appointment  Completing an E-Visit  Sending a message to the office/provider  Having access to your medication list  Paying your bill/copay prior to your appointment  Scheduling your yearly mammogram  Review your test results    If you are not familiar with [facility] please ask one of us and we will be happy to answer any questions or help you set-up your account.       Your [employer],

## 2023-08-09 ENCOUNTER — TELEPHONE (OUTPATIENT)
Dept: VASCULAR SURGERY | Age: 48
End: 2023-08-09

## 2023-08-09 DIAGNOSIS — Z01.818 PREOP TESTING: Primary | ICD-10-CM

## 2023-08-11 ENCOUNTER — HOSPITAL ENCOUNTER (OUTPATIENT)
Dept: ULTRASOUND IMAGING | Age: 48
End: 2023-08-11
Payer: COMMERCIAL

## 2023-08-11 ENCOUNTER — HOSPITAL ENCOUNTER (OUTPATIENT)
Dept: MAMMOGRAPHY | Age: 48
End: 2023-08-11
Payer: COMMERCIAL

## 2023-08-11 DIAGNOSIS — R92.8 ABNORMAL MAMMOGRAM: ICD-10-CM

## 2023-08-11 DIAGNOSIS — N64.89 BREAST ASYMMETRY: ICD-10-CM

## 2023-08-11 PROCEDURE — 76642 ULTRASOUND BREAST LIMITED: CPT

## 2023-08-11 PROCEDURE — G0279 TOMOSYNTHESIS, MAMMO: HCPCS

## 2023-08-14 ENCOUNTER — TELEPHONE (OUTPATIENT)
Dept: OBGYN CLINIC | Age: 48
End: 2023-08-14

## 2023-08-14 DIAGNOSIS — N64.89 BREAST ASYMMETRY: Primary | ICD-10-CM

## 2023-08-14 NOTE — TELEPHONE ENCOUNTER
----- Message from MATT Herbert CNP sent at 8/14/2023  8:13 AM EDT -----  Asymmetry noted on right breast-findings likely benign.   Recommend follow up with 6 month mammogram

## 2023-08-25 ENCOUNTER — INITIAL CONSULT (OUTPATIENT)
Dept: VASCULAR SURGERY | Age: 48
End: 2023-08-25
Payer: COMMERCIAL

## 2023-08-25 VITALS
BODY MASS INDEX: 39.85 KG/M2 | RESPIRATION RATE: 18 BRPM | OXYGEN SATURATION: 96 % | HEART RATE: 82 BPM | SYSTOLIC BLOOD PRESSURE: 132 MMHG | TEMPERATURE: 97.9 F | DIASTOLIC BLOOD PRESSURE: 87 MMHG | HEIGHT: 60 IN | WEIGHT: 203 LBS

## 2023-08-25 DIAGNOSIS — I83.90 ASYMPTOMATIC VARICOSE VEINS: Primary | ICD-10-CM

## 2023-08-25 PROCEDURE — 99203 OFFICE O/P NEW LOW 30 MIN: CPT | Performed by: SURGERY

## 2023-08-25 ASSESSMENT — ENCOUNTER SYMPTOMS
EYE PAIN: 0
ABDOMINAL DISTENTION: 0
VOICE CHANGE: 0
TROUBLE SWALLOWING: 0
ABDOMINAL PAIN: 0
SHORTNESS OF BREATH: 0
COUGH: 0
CHEST TIGHTNESS: 0
VOMITING: 0
COLOR CHANGE: 0

## 2023-08-25 NOTE — PROGRESS NOTES
Not on file    Number of children: Not on file    Years of education: Not on file    Highest education level: Not on file   Occupational History    Not on file   Tobacco Use    Smoking status: Never    Smokeless tobacco: Never    Tobacco comments:     Never smoked   Vaping Use    Vaping Use: Never used   Substance and Sexual Activity    Alcohol use: Yes     Alcohol/week: 1.0 standard drink     Types: 1 Drinks containing 0.5 oz of alcohol per week     Comment: Raely drink    Drug use: No    Sexual activity: Yes     Partners: Male     Birth control/protection: Surgical     Comment: TL   Other Topics Concern    Not on file   Social History Narrative    Not on file     Social Determinants of Health     Financial Resource Strain: Low Risk     Difficulty of Paying Living Expenses: Not hard at all   Food Insecurity: No Food Insecurity    Worried About Running Out of Food in the Last Year: Never true    Ran Out of Food in the Last Year: Never true   Transportation Needs: Unknown    Lack of Transportation (Medical): Not on file    Lack of Transportation (Non-Medical): No   Physical Activity: Not on file   Stress: Not on file   Social Connections: Not on file   Intimate Partner Violence: Not on file   Housing Stability: Unknown    Unable to Pay for Housing in the Last Year: Not on file    Number of Places Lived in the Last Year: Not on file    Unstable Housing in the Last Year: No      Past Surgical History:   Procedure Laterality Date    APPENDECTOMY  07/31/2021    [facility]    COLONOSCOPY      COLONOSCOPY  06/28/2019    COLONOSCOPY N/A 06/28/2019    COLORECTAL CANCER SCREENING, NOT HIGH RISK performed by Josefa Aranda MD at [address] [place] [address] EXTRACTION        Review of Systems   Constitutional:  Negative for activity change, appetite change, fever and unexpected weight change.    HENT:  Negative for

## 2023-08-30 ENCOUNTER — HOSPITAL ENCOUNTER (EMERGENCY)
Age: 48
Discharge: HOME | End: 2023-08-30
Attending: EMERGENCY MEDICINE
Payer: COMMERCIAL

## 2023-08-30 VITALS
WEIGHT: 200 LBS | HEART RATE: 84 BPM | DIASTOLIC BLOOD PRESSURE: 91 MMHG | RESPIRATION RATE: 18 BRPM | BODY MASS INDEX: 39.27 KG/M2 | HEIGHT: 60 IN | OXYGEN SATURATION: 97 % | SYSTOLIC BLOOD PRESSURE: 152 MMHG

## 2023-08-30 DIAGNOSIS — Z20.9 EXPOSURE TO BAT WITHOUT KNOWN BITE: Primary | ICD-10-CM

## 2023-08-30 PROCEDURE — 90375 RABIES IG IM/SC: CPT | Performed by: EMERGENCY MEDICINE

## 2023-08-30 PROCEDURE — 90675 RABIES VACCINE IM: CPT | Performed by: EMERGENCY MEDICINE

## 2023-08-30 PROCEDURE — 99284 EMERGENCY DEPT VISIT MOD MDM: CPT

## 2023-08-30 PROCEDURE — 90471 IMMUNIZATION ADMIN: CPT | Performed by: EMERGENCY MEDICINE

## 2023-08-30 PROCEDURE — 6360000002 HC RX W HCPCS: Performed by: EMERGENCY MEDICINE

## 2023-08-30 RX ADMIN — RABIES VACCINE 1 ML: KIT at 18:21

## 2023-08-30 RX ADMIN — RABIES IMMUNE GLOBULIN (HUMAN) 1845 UNITS: 300 INJECTION, SOLUTION INFILTRATION; INTRAMUSCULAR at 18:22
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
## 2023-08-31 ENCOUNTER — TELEPHONE (OUTPATIENT)
Dept: FAMILY MEDICINE CLINIC | Age: 48
End: 2023-08-31

## 2023-08-31 NOTE — TELEPHONE ENCOUNTER
[facility] ([facility]) ED Follow up Call    Reason for ED visit:  ANIMAL BITE         Hi Celia , this is Margaret Kirt from Dr. Bryson Cota office, just calling to see how you are doing after your recent ED visit. Did you receive discharge instructions? Yes  Do you understand the discharge instructions? Yes  Did the ED give you any new prescriptions? No:   Were you able to fill your prescriptions? No:       Do you have one of our red, yellow and green  Zone sheets that help you to determine when you should go to the ED? Not Applicable      Do you need or want to make a follow up appt with your PCP? No    Do you have any further needs in the home i.e. Equipment?   No        FU appts/Provider:    Future Appointments   Date Time Provider [address]   9/14/2023  2:40 PM Michelle Sierra MD Neuro Spec Neurology -   9/15/2023  2:00 PM [facility]   9/29/2023 10:30 AM MATT Hawthorne - [facility]   10/5/2023  9:00 AM David CHING [address]   11/17/2023  3:00 PM [facility]
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
## 2023-09-07 DIAGNOSIS — E55.9 VITAMIN D DEFICIENCY: ICD-10-CM

## 2023-09-08 RX ORDER — ERGOCALCIFEROL 1.25 MG/1
CAPSULE ORAL
Qty: 12 CAPSULE | Refills: 0 | Status: SHIPPED | OUTPATIENT
Start: 2023-09-08

## 2023-09-08 NOTE — TELEPHONE ENCOUNTER
Please Approve or Refuse.   Send to Pharmacy per Pt's Request:      Next Visit Date:  9/15/2023   Last Visit Date: 8/3/2023    Hemoglobin A1C (%)   Date Value   08/03/2023 5.6   08/26/2022 5.8   04/01/2022 6.0             ( goal A1C is < 7)   BP Readings from Last 3 Encounters:   08/30/23 (!) 152/91   08/25/23 132/87   08/03/23 110/70          (goal 120/80)  BUN   Date Value Ref Range Status   08/26/2022 8 6 - 20 mg/dL Final     Creatinine   Date Value Ref Range Status   08/26/2022 0.68 0.50 - 0.90 mg/dL Final     Potassium   Date Value Ref Range Status   08/26/2022 4.2 3.7 - 5.3 mmol/L Final

## 2023-09-14 ENCOUNTER — OFFICE VISIT (OUTPATIENT)
Dept: NEUROLOGY | Age: 48
End: 2023-09-14
Payer: COMMERCIAL

## 2023-09-14 VITALS
HEART RATE: 91 BPM | BODY MASS INDEX: 39.85 KG/M2 | WEIGHT: 203 LBS | DIASTOLIC BLOOD PRESSURE: 85 MMHG | HEIGHT: 60 IN | SYSTOLIC BLOOD PRESSURE: 134 MMHG

## 2023-09-14 DIAGNOSIS — G43.009 MIGRAINE WITHOUT AURA AND WITHOUT STATUS MIGRAINOSUS, NOT INTRACTABLE: Primary | ICD-10-CM

## 2023-09-14 PROCEDURE — 99214 OFFICE O/P EST MOD 30 MIN: CPT | Performed by: STUDENT IN AN ORGANIZED HEALTH CARE EDUCATION/TRAINING PROGRAM

## 2023-09-14 RX ORDER — RIMEGEPANT SULFATE 75 MG/75MG
75 TABLET, ORALLY DISINTEGRATING ORAL EVERY OTHER DAY
Qty: 15 TABLET | Refills: 2 | Status: SHIPPED | OUTPATIENT
Start: 2023-09-14 | End: 2024-09-05

## 2023-09-14 RX ORDER — RIZATRIPTAN BENZOATE 10 MG/1
10 TABLET ORAL PRN
Qty: 10 TABLET | Refills: 3 | Status: SHIPPED | OUTPATIENT
Start: 2023-09-14

## 2023-09-14 ASSESSMENT — ENCOUNTER SYMPTOMS
EYES NEGATIVE: 1
GASTROINTESTINAL NEGATIVE: 1
RESPIRATORY NEGATIVE: 1

## 2023-09-14 NOTE — PROGRESS NOTES
female that established care with neurology for migraines without aura. Plan:  Discussed lifestyle modifications in detail. Discussed weight loss, adequate hydration, diet changes  Magnesium 500 mg daily  Prior medications: topamax, elavil  Rizatriptan for abortive therapy   Start Nurtec 75 mg every other day for migraine prophylaxis.     Tito Cheng MD   Neurology & Sleep Medicine  [facility]

## 2023-09-15 ENCOUNTER — OFFICE VISIT (OUTPATIENT)
Dept: FAMILY MEDICINE CLINIC | Age: 48
End: 2023-09-15
Payer: COMMERCIAL

## 2023-09-15 VITALS
HEIGHT: 60 IN | BODY MASS INDEX: 40.21 KG/M2 | TEMPERATURE: 97.4 F | SYSTOLIC BLOOD PRESSURE: 114 MMHG | WEIGHT: 204.8 LBS | DIASTOLIC BLOOD PRESSURE: 60 MMHG

## 2023-09-15 DIAGNOSIS — E55.9 VITAMIN D DEFICIENCY: ICD-10-CM

## 2023-09-15 DIAGNOSIS — Z11.59 ENCOUNTER FOR SCREENING FOR OTHER VIRAL DISEASES: ICD-10-CM

## 2023-09-15 DIAGNOSIS — R73.9 HYPERGLYCEMIA: ICD-10-CM

## 2023-09-15 DIAGNOSIS — Z87.442 HISTORY OF KIDNEY STONES: ICD-10-CM

## 2023-09-15 DIAGNOSIS — G43.009 MIGRAINE WITHOUT AURA AND WITHOUT STATUS MIGRAINOSUS, NOT INTRACTABLE: ICD-10-CM

## 2023-09-15 DIAGNOSIS — R53.82 CHRONIC FATIGUE: ICD-10-CM

## 2023-09-15 DIAGNOSIS — E66.01 MORBID OBESITY WITH BMI OF 40.0-44.9, ADULT (HCC): Primary | ICD-10-CM

## 2023-09-15 DIAGNOSIS — E78.5 HYPERLIPIDEMIA WITH TARGET LDL LESS THAN 100: ICD-10-CM

## 2023-09-15 PROBLEM — E66.811 OBESITY (BMI 30.0-34.9): Status: RESOLVED | Noted: 2022-05-09 | Resolved: 2023-09-15

## 2023-09-15 PROBLEM — N94.6 DYSMENORRHEA: Status: RESOLVED | Noted: 2023-03-24 | Resolved: 2023-09-15

## 2023-09-15 PROBLEM — R00.2 HEART PALPITATIONS: Status: RESOLVED | Noted: 2020-02-13 | Resolved: 2023-09-15

## 2023-09-15 PROBLEM — R94.31 ABNORMAL EKG: Status: RESOLVED | Noted: 2020-02-13 | Resolved: 2023-09-15

## 2023-09-15 PROBLEM — E66.9 OBESITY (BMI 30.0-34.9): Status: RESOLVED | Noted: 2022-05-09 | Resolved: 2023-09-15

## 2023-09-15 PROBLEM — N64.4 BREAST PAIN IN FEMALE: Status: RESOLVED | Noted: 2023-08-03 | Resolved: 2023-09-15

## 2023-09-15 PROCEDURE — 99214 OFFICE O/P EST MOD 30 MIN: CPT | Performed by: FAMILY MEDICINE

## 2023-09-15 RX ORDER — PHENTERMINE HYDROCHLORIDE 37.5 MG/1
37.5 TABLET ORAL
Qty: 30 TABLET | Refills: 0 | Status: SHIPPED | OUTPATIENT
Start: 2023-09-15 | End: 2023-10-15

## 2023-09-15 RX ORDER — ONDANSETRON 4 MG/1
4 TABLET, FILM COATED ORAL EVERY 6 HOURS PRN
Qty: 24 TABLET | Refills: 0 | Status: SHIPPED | OUTPATIENT
Start: 2023-09-15 | End: 2023-09-21

## 2023-09-15 ASSESSMENT — ENCOUNTER SYMPTOMS
ABDOMINAL DISTENTION: 0
CONSTIPATION: 0
SHORTNESS OF BREATH: 0
NAUSEA: 1
DIARRHEA: 0
CHEST TIGHTNESS: 0
VOMITING: 0
COUGH: 0
WHEEZING: 0
ABDOMINAL PAIN: 0

## 2023-09-15 NOTE — PROGRESS NOTES
Visit Information    Have you changed or started any medications since your last visit including any over-the-counter medicines, vitamins, or herbal medicines? no   Have you stopped taking any of your medications? Is so, why? -  yes -   Are you having any side effects from any of your medications? - no    Have you seen any other physician or provider since your last visit? yes -    Have you had any other diagnostic tests since your last visit? yes -    Have you been seen in the emergency room and/or had an admission in a hospital since we last saw you?  no   Have you had your routine dental cleaning in the past 6 months?  yes -      Do you have an active MyChart account? If no, what is the barrier?   Yes    Patient Care Team:  Abner Quiros MD as PCP - General (Family Medicine)  Abner Quiros MD as PCP - Empaneled Provider  MATT Lerma CNP as Nurse Practitioner (Certified Nurse Practitioner)  MATT Campa - CNP (Cardiology)  Clarissa Doshi DO as Consulting Physician (Obstetrics & Gynecology)  Flower Bell MD as Consulting Physician (Neurology)  Yamilex Barth MD as Consulting Physician (Cardiology)    Medical History Review  Past Medical, Family, and Social History reviewed and does not contribute to the patient presenting condition    Health Maintenance   Topic Date Due    Hepatitis B vaccine (1 of 3 - 3-dose series) Never done    COVID-19 Vaccine (6 - Nikkie Stager series) 03/24/2022    Flu vaccine (1) 08/01/2023    Lipids  08/26/2023    A1C test (Diabetic or Prediabetic)  08/03/2024    Depression Monitoring  08/03/2024    Cervical cancer screen  09/23/2025    DTaP/Tdap/Td vaccine (2 - Td or Tdap) 02/14/2027    Colorectal Cancer Screen  06/28/2029    Pneumococcal 0-64 years Vaccine  Completed    Hepatitis C screen  Completed    HIV screen  Completed    Hepatitis A vaccine  Aged Out    Hib vaccine  Aged Out    Meningococcal (ACWY) vaccine  Aged Out
injection     Sig: START with 0.6 MG SC DAILY X 1 WEEK, THEN CONTINUE WITH 1.2 MG SC DAILY. CALL FOR REFILL     Dispense:  3 Adjustable Dose Pre-filled Pen Syringe     Refill:  0    Insulin Pen Needle 31G X 5 MM MISC     Si each by Does not apply route daily TO BE USED with VICTOZA, DAILY     Dispense:  100 each     Refill:  3    phentermine (ADIPEX-P) 37.5 MG tablet     Sig: Take 1 tablet by mouth every morning (before breakfast) for 30 days. Max Daily Amount: 37.5 mg     Dispense:  30 tablet     Refill:  0    ondansetron (ZOFRAN) 4 MG tablet     Sig: Take 1 tablet by mouth every 6 hours as needed for Nausea or Vomiting     Dispense:  24 tablet     Refill:  0       Medications Discontinued During This Encounter   Medication Reason    SUMAtriptan (IMITREX) 50 MG tablet Side effects    silver sulfADIAZINE (SILVADENE) 1 % cream Therapy completed    busPIRone (BUSPAR) 5 MG tablet Patient Choice    fluconazole (DIFLUCAN) 150 MG tablet Therapy completed    fluconazole (DIFLUCAN) 150 MG tablet Therapy completed    lidocaine (LMX) 4 % cream Therapy completed    Insulin Pen Needle (B-D UF III MINI PEN NEEDLES) 31G X 5 MM MISC REORDER           On this date 9/15/2023 I have spent 39 minutes reviewing previous notes, test results and face to face with the patient discussing the diagnosis and importance of compliance with the treatment plan as well as documenting on the day of the visit. This note was completed by using the assistance of a speech-recognition program. However, inadvertent computerized transcription errors may be present. Although every effort was made to ensure accuracy, no guarantees can be provided that every mistake has been identified and corrected by editing. An electronic signature was used to authenticate this note.   Electronically signed by Devonte Ford MD on 9/15/2023 at 8:01 PM

## 2023-09-20 ENCOUNTER — TELEPHONE (OUTPATIENT)
Dept: NEUROLOGY | Age: 48
End: 2023-09-20

## 2023-09-22 NOTE — TELEPHONE ENCOUNTER
Attempted to contact patient regarding this in order to clarify exactly how many headache days per month she is currently experiencing. In order to be approved, she needs to fall between 1 and 14 headache days per month. Unable to leave message as her VM box has not been set up yet. Will try again.

## 2023-09-27 NOTE — TELEPHONE ENCOUNTER
Reconsideration letter completed and faxed to [facility] [place] at [phone].

## 2023-09-29 ENCOUNTER — OFFICE VISIT (OUTPATIENT)
Dept: OBGYN CLINIC | Age: 48
End: 2023-09-29
Payer: COMMERCIAL

## 2023-09-29 ENCOUNTER — HOSPITAL ENCOUNTER (OUTPATIENT)
Age: 48
Setting detail: SPECIMEN
Discharge: HOME | End: 2023-09-29

## 2023-09-29 VITALS
BODY MASS INDEX: 39.66 KG/M2 | SYSTOLIC BLOOD PRESSURE: 114 MMHG | WEIGHT: 202 LBS | HEIGHT: 60 IN | HEART RATE: 72 BPM | DIASTOLIC BLOOD PRESSURE: 68 MMHG

## 2023-09-29 DIAGNOSIS — R19.8 ABDOMINAL FULLNESS: ICD-10-CM

## 2023-09-29 DIAGNOSIS — N76.0 ACUTE VAGINITIS: ICD-10-CM

## 2023-09-29 DIAGNOSIS — Z12.31 ENCOUNTER FOR SCREENING MAMMOGRAM FOR MALIGNANT NEOPLASM OF BREAST: ICD-10-CM

## 2023-09-29 DIAGNOSIS — Z01.419 WELL FEMALE EXAM WITH ROUTINE GYNECOLOGICAL EXAM: Primary | ICD-10-CM

## 2023-09-29 DIAGNOSIS — Z11.51 SPECIAL SCREENING EXAMINATION FOR HUMAN PAPILLOMAVIRUS (HPV): ICD-10-CM

## 2023-09-29 DIAGNOSIS — R14.0 ABDOMINAL BLOATING: ICD-10-CM

## 2023-09-29 DIAGNOSIS — N64.4 BREAST PAIN: ICD-10-CM

## 2023-09-29 PROCEDURE — 99396 PREV VISIT EST AGE 40-64: CPT | Performed by: NURSE PRACTITIONER

## 2023-09-29 RX ORDER — CLOTRIMAZOLE AND BETAMETHASONE DIPROPIONATE 10; .64 MG/G; MG/G
CREAM TOPICAL
Qty: 45 G | Refills: 1 | Status: SHIPPED | OUTPATIENT
Start: 2023-09-29

## 2023-09-29 NOTE — PROGRESS NOTES
History and Physical  [address] [place], [address] [place] [address] [phone]   Fax [phone]  Sherley Sprague  2024              51 y.o. Chief Complaint   Patient presents with    Annual Exam       No LMP recorded. Patient has had an ablation. Primary Care Physician: Abner Quiros MD    The patient was seen and examined. She has no chief complaint today and is here for her annual exam.  Her bowels are regular. There are no voiding complaints. She denies any bloating. She denies vaginal discharge and was counseled on STD's and the need for barrier contraception. HPI : Sherley Sprague is a 51 y.o. female [id number]    Annual exam  Complaining of vaginal irritation and itching in groin area. Noticed after shaving a few days ago. Same partner for past 2 years. Complaining of abdominal bloating. Has daily BM, soft. Patient recently started on Adipex and started walking on a routine basis. Complaining of breast pain on both sides and nipple erection in July. Denies family hx of breast cancer. Denies lumps. Hx of endometrial ablation in . Recently started having periods again last year and now they are very painful. Has follow up scheduled with DR Omar Briggs in November.      ________________________________________________________________________  OB History    Para Term  AB Living   5 4 4 0 1 4   SAB IAB Ectopic Molar Multiple Live Births   0 1 0 0 0 4      # Outcome Date GA Lbr Robert/2nd Weight Sex Delivery Anes PTL Lv   5 IAB        N    4 Term      Vag-Spont  N MONTY   3 Term      Vag-Spont  N MONTY   2 Term      Vag-Spont  N MONTY   1 Term      Vag-Spont  N MONTY     Past Medical History:   Diagnosis Date    Abnormal EKG 2020    Allergic rhinitis due to allergen 2022    Anxiety 2019    Asthma     Current mild episode of major depressive disorder without prior episode ([address]) 2020    Dyslipidemia

## 2023-09-30 LAB
CANDIDA SPECIES: NEGATIVE
GARDNERELLA VAGINALIS: NEGATIVE
SOURCE: NORMAL
TRICHOMONAS: NEGATIVE

## 2023-10-02 LAB
C TRACH DNA SPEC QL PROBE+SIG AMP: NEGATIVE
N GONORRHOEA DNA SPEC QL PROBE+SIG AMP: NEGATIVE
SPECIMEN DESCRIPTION: NORMAL

## 2023-10-03 DIAGNOSIS — E78.5 HYPERLIPIDEMIA WITH TARGET LDL LESS THAN 100: ICD-10-CM

## 2023-10-03 DIAGNOSIS — M54.2 NECK PAIN: ICD-10-CM

## 2023-10-03 LAB
HPV I/H RISK 4 DNA CVX QL NAA+PROBE: NOT DETECTED
HPV SAMPLE: NORMAL
HPV, INTERPRETATION: NORMAL
HPV16 DNA CVX QL NAA+PROBE: NOT DETECTED
HPV18 DNA CVX QL NAA+PROBE: NOT DETECTED
SPECIMEN DESCRIPTION: NORMAL

## 2023-10-03 RX ORDER — BACLOFEN 10 MG/1
TABLET ORAL
Qty: 90 TABLET | Refills: 3 | Status: SHIPPED | OUTPATIENT
Start: 2023-10-03

## 2023-10-03 RX ORDER — PRAVASTATIN SODIUM 40 MG
TABLET ORAL
Qty: 90 TABLET | Refills: 3 | Status: SHIPPED | OUTPATIENT
Start: 2023-10-03

## 2023-10-03 NOTE — TELEPHONE ENCOUNTER
Please Approve or Refuse.   Send to Pharmacy per Pt's Request:      Next Visit Date:  10/13/2023   Last Visit Date: 9/15/2023    Hemoglobin A1C (%)   Date Value   08/03/2023 5.6   08/26/2022 5.8   04/01/2022 6.0             ( goal A1C is < 7)   BP Readings from Last 3 Encounters:   09/29/23 114/68   09/15/23 114/60   09/14/23 134/85          (goal 120/80)  BUN   Date Value Ref Range Status   08/26/2022 8 6 - 20 mg/dL Final     Creatinine   Date Value Ref Range Status   08/26/2022 0.68 0.50 - 0.90 mg/dL Final     Potassium   Date Value Ref Range Status   08/26/2022 4.2 3.7 - 5.3 mmol/L Final

## 2023-10-07 LAB — CYTOLOGY REPORT: NORMAL

## 2023-10-13 ENCOUNTER — HOSPITAL ENCOUNTER (OUTPATIENT)
Age: 48
Discharge: HOME | End: 2023-10-13
Payer: COMMERCIAL

## 2023-10-13 DIAGNOSIS — E78.5 HYPERLIPIDEMIA WITH TARGET LDL LESS THAN 100: ICD-10-CM

## 2023-10-13 DIAGNOSIS — R53.82 CHRONIC FATIGUE: ICD-10-CM

## 2023-10-13 DIAGNOSIS — Z11.59 ENCOUNTER FOR SCREENING FOR OTHER VIRAL DISEASES: ICD-10-CM

## 2023-10-13 DIAGNOSIS — E55.9 VITAMIN D DEFICIENCY: ICD-10-CM

## 2023-10-13 DIAGNOSIS — E66.01 MORBID OBESITY WITH BMI OF 40.0-44.9, ADULT (HCC): ICD-10-CM

## 2023-10-13 DIAGNOSIS — R73.9 HYPERGLYCEMIA: ICD-10-CM

## 2023-10-13 DIAGNOSIS — R73.03 PREDIABETES: ICD-10-CM

## 2023-10-13 LAB
25(OH)D3 SERPL-MCNC: 35.6 NG/ML
ALBUMIN SERPL-MCNC: 4.3 G/DL (ref 3.5–5.2)
ALBUMIN/GLOB SERPL: 1.4 {RATIO} (ref 1–2.5)
ALP SERPL-CCNC: 62 U/L (ref 35–104)
ALT SERPL-CCNC: 11 U/L (ref 5–33)
ANION GAP SERPL CALCULATED.3IONS-SCNC: 9 MMOL/L (ref 9–17)
AST SERPL-CCNC: 12 U/L
BASOPHILS # BLD: <0.03 K/UL (ref 0–0.2)
BASOPHILS NFR BLD: 0 % (ref 0–2)
BILIRUB SERPL-MCNC: 0.2 MG/DL (ref 0.3–1.2)
BUN SERPL-MCNC: 8 MG/DL (ref 6–20)
CALCIUM SERPL-MCNC: 9 MG/DL (ref 8.6–10.4)
CHLORIDE SERPL-SCNC: 101 MMOL/L (ref 98–107)
CHOLEST SERPL-MCNC: 198 MG/DL
CHOLESTEROL/HDL RATIO: 4
CO2 SERPL-SCNC: 27 MMOL/L (ref 20–31)
CORTIS SERPL-MCNC: 13.4 UG/DL (ref 2.7–18.4)
CORTISOL COLLECTION INFO: NORMAL
CREAT SERPL-MCNC: 0.6 MG/DL (ref 0.5–0.9)
EOSINOPHIL # BLD: 0.17 K/UL (ref 0–0.44)
EOSINOPHILS RELATIVE PERCENT: 3 % (ref 1–4)
ERYTHROCYTE [DISTWIDTH] IN BLOOD BY AUTOMATED COUNT: 12.5 % (ref 11.8–14.4)
ERYTHROCYTE [DISTWIDTH] IN BLOOD BY AUTOMATED COUNT: 12.5 % (ref 11.8–14.4)
FOLATE SERPL-MCNC: 14.7 NG/ML
GFR SERPL CREATININE-BSD FRML MDRD: >60 ML/MIN/1.73M2
GLUCOSE SERPL-MCNC: 99 MG/DL (ref 70–99)
HBV SURFACE AB SERPL IA-ACNC: 18.37 MIU/ML
HCT VFR BLD AUTO: 38.4 % (ref 36.3–47.1)
HCT VFR BLD AUTO: 38.4 % (ref 36.3–47.1)
HDLC SERPL-MCNC: 50 MG/DL
HGB BLD-MCNC: 12.7 G/DL (ref 11.9–15.1)
HGB BLD-MCNC: 12.7 G/DL (ref 11.9–15.1)
IMM GRANULOCYTES # BLD AUTO: <0.03 K/UL (ref 0–0.3)
IMM GRANULOCYTES NFR BLD: 0 %
LDLC SERPL CALC-MCNC: 108 MG/DL (ref 0–130)
LYMPHOCYTES NFR BLD: 2.02 K/UL (ref 1.1–3.7)
LYMPHOCYTES RELATIVE PERCENT: 36 % (ref 24–43)
MAGNESIUM SERPL-MCNC: 1.7 MG/DL (ref 1.6–2.6)
MCH RBC QN AUTO: 30.9 PG (ref 25.2–33.5)
MCH RBC QN AUTO: 30.9 PG (ref 25.2–33.5)
MCHC RBC AUTO-ENTMCNC: 33.1 G/DL (ref 28.4–34.8)
MCHC RBC AUTO-ENTMCNC: 33.1 G/DL (ref 28.4–34.8)
MCV RBC AUTO: 93.4 FL (ref 82.6–102.9)
MCV RBC AUTO: 93.4 FL (ref 82.6–102.9)
MONOCYTES NFR BLD: 0.42 K/UL (ref 0.1–1.2)
MONOCYTES NFR BLD: 7 % (ref 3–12)
NEUTROPHILS NFR BLD: 54 % (ref 36–65)
NEUTS SEG NFR BLD: 3.05 K/UL (ref 1.5–8.1)
NRBC BLD-RTO: 0 PER 100 WBC
NRBC BLD-RTO: 0 PER 100 WBC
PLATELET # BLD AUTO: 292 K/UL (ref 138–453)
PLATELET # BLD AUTO: 292 K/UL (ref 138–453)
PMV BLD AUTO: 11 FL (ref 8.1–13.5)
PMV BLD AUTO: 11 FL (ref 8.1–13.5)
POTASSIUM SERPL-SCNC: 3.8 MMOL/L (ref 3.7–5.3)
PROT SERPL-MCNC: 7.3 G/DL (ref 6.4–8.3)
RBC # BLD AUTO: 4.11 M/UL (ref 3.95–5.11)
RBC # BLD AUTO: 4.11 M/UL (ref 3.95–5.11)
SODIUM SERPL-SCNC: 137 MMOL/L (ref 135–144)
TRIGL SERPL-MCNC: 202 MG/DL
TSH SERPL DL<=0.05 MIU/L-ACNC: 2.93 UIU/ML (ref 0.3–5)
TSH SERPL DL<=0.05 MIU/L-ACNC: 2.93 UIU/ML (ref 0.3–5)
URATE SERPL-MCNC: 5.7 MG/DL (ref 2.4–5.7)
VIT B12 SERPL-MCNC: 523 PG/ML (ref 232–1245)
WBC OTHER # BLD: 5.7 K/UL (ref 3.5–11.3)
WBC OTHER # BLD: 5.7 K/UL (ref 3.5–11.3)

## 2023-10-13 PROCEDURE — 83735 ASSAY OF MAGNESIUM: CPT

## 2023-10-13 PROCEDURE — 84550 ASSAY OF BLOOD/URIC ACID: CPT

## 2023-10-13 PROCEDURE — 82306 VITAMIN D 25 HYDROXY: CPT

## 2023-10-13 PROCEDURE — 82746 ASSAY OF FOLIC ACID SERUM: CPT

## 2023-10-13 PROCEDURE — 82533 TOTAL CORTISOL: CPT

## 2023-10-13 PROCEDURE — 86317 IMMUNOASSAY INFECTIOUS AGENT: CPT

## 2023-10-13 PROCEDURE — 80061 LIPID PANEL: CPT

## 2023-10-13 PROCEDURE — 36415 COLL VENOUS BLD VENIPUNCTURE: CPT

## 2023-10-13 PROCEDURE — 80053 COMPREHEN METABOLIC PANEL: CPT

## 2023-10-13 PROCEDURE — 84443 ASSAY THYROID STIM HORMONE: CPT

## 2023-10-13 PROCEDURE — 82607 VITAMIN B-12: CPT

## 2023-10-13 PROCEDURE — 85025 COMPLETE CBC W/AUTO DIFF WBC: CPT

## 2023-10-13 NOTE — RESULT ENCOUNTER NOTE
Please notify patient: Immune against hepatitis B that means she does not need vaccination for hepatitis B  Improved lipids  Improving vitamin D  cortisol level is normal  Otherwise labs within normal limits  continue current treatment    Future Appointments  10/17/2023 3:00 PM    MATT Null CNP   [facility]  10/17/2023 3:30 PM    [address] [place] [address],[address] [place] [address]  11/10/2023 10:00 AM   Nano Blair MD    [facility]  11/16/2023 9:30 AM    DO CRISTY Ferrari OB/Gyn        [facility]  12/21/2023 3:20 PM    Adriana Ramirez MD           Neuro Spec          Neurology -

## 2023-10-17 ENCOUNTER — PROCEDURE VISIT (OUTPATIENT)
Dept: OBGYN CLINIC | Age: 48
End: 2023-10-17
Payer: COMMERCIAL

## 2023-10-17 ENCOUNTER — TELEPHONE (OUTPATIENT)
Dept: FAMILY MEDICINE CLINIC | Age: 48
End: 2023-10-17

## 2023-10-17 ENCOUNTER — OFFICE VISIT (OUTPATIENT)
Dept: FAMILY MEDICINE CLINIC | Age: 48
End: 2023-10-17
Payer: COMMERCIAL

## 2023-10-17 VITALS
TEMPERATURE: 98 F | BODY MASS INDEX: 38.91 KG/M2 | WEIGHT: 198.2 LBS | HEART RATE: 82 BPM | SYSTOLIC BLOOD PRESSURE: 126 MMHG | HEIGHT: 60 IN | OXYGEN SATURATION: 98 % | DIASTOLIC BLOOD PRESSURE: 76 MMHG

## 2023-10-17 DIAGNOSIS — R19.8 ABDOMINAL FULLNESS: ICD-10-CM

## 2023-10-17 DIAGNOSIS — E66.9 CLASS 2 OBESITY WITHOUT SERIOUS COMORBIDITY WITH BODY MASS INDEX (BMI) OF 38.0 TO 38.9 IN ADULT, UNSPECIFIED OBESITY TYPE: Primary | ICD-10-CM

## 2023-10-17 DIAGNOSIS — Z02.83 ENCOUNTER FOR DRUG SCREENING: ICD-10-CM

## 2023-10-17 DIAGNOSIS — R14.0 ABDOMINAL BLOATING: ICD-10-CM

## 2023-10-17 LAB
ALCOHOL URINE: NEGATIVE
AMPHETAMINE SCREEN, URINE: POSITIVE
BARBITURATE SCREEN, URINE: NEGATIVE
BENZODIAZEPINE SCREEN, URINE: NEGATIVE
BUPRENORPHINE URINE: NEGATIVE
COCAINE METABOLITE SCREEN URINE: NEGATIVE
FENTANYL SCREEN, URINE: ABNORMAL
GABAPENTIN SCREEN, URINE: ABNORMAL
MDMA URINE: NEGATIVE
METHADONE SCREEN, URINE: NEGATIVE
METHAMPHETAMINE, URINE: NEGATIVE
OPIATE SCREEN URINE: NEGATIVE
OXYCODONE SCREEN URINE: NEGATIVE
PHENCYCLIDINE SCREEN URINE: NEGATIVE
PROPOXYPHENE SCREEN, URINE: ABNORMAL
SYNTHETIC CANNABINOIDS(K2) SCREEN, URINE: ABNORMAL
THC SCREEN, URINE: NEGATIVE
TRAMADOL SCREEN URINE: ABNORMAL
TRICYCLIC ANTIDEPRESSANTS, UR: NEGATIVE

## 2023-10-17 PROCEDURE — 80305 DRUG TEST PRSMV DIR OPT OBS: CPT | Performed by: NURSE PRACTITIONER

## 2023-10-17 PROCEDURE — 99215 OFFICE O/P EST HI 40 MIN: CPT | Performed by: NURSE PRACTITIONER

## 2023-10-17 PROCEDURE — 99999 PR OFFICE/OUTPT VISIT,PROCEDURE ONLY: CPT | Performed by: OBSTETRICS & GYNECOLOGY

## 2023-10-17 PROCEDURE — 76830 TRANSVAGINAL US NON-OB: CPT | Performed by: OBSTETRICS & GYNECOLOGY

## 2023-10-17 RX ORDER — PHENTERMINE HYDROCHLORIDE 37.5 MG/1
37.5 TABLET ORAL
Qty: 30 TABLET | Refills: 0 | Status: SHIPPED | OUTPATIENT
Start: 2023-10-17 | End: 2023-11-16

## 2023-10-17 SDOH — ECONOMIC STABILITY: FOOD INSECURITY: WITHIN THE PAST 12 MONTHS, YOU WORRIED THAT YOUR FOOD WOULD RUN OUT BEFORE YOU GOT MONEY TO BUY MORE.: NEVER TRUE

## 2023-10-17 SDOH — ECONOMIC STABILITY: INCOME INSECURITY: HOW HARD IS IT FOR YOU TO PAY FOR THE VERY BASICS LIKE FOOD, HOUSING, MEDICAL CARE, AND HEATING?: NOT HARD AT ALL

## 2023-10-17 SDOH — ECONOMIC STABILITY: FOOD INSECURITY: WITHIN THE PAST 12 MONTHS, THE FOOD YOU BOUGHT JUST DIDN'T LAST AND YOU DIDN'T HAVE MONEY TO GET MORE.: NEVER TRUE

## 2023-10-17 ASSESSMENT — PATIENT HEALTH QUESTIONNAIRE - PHQ9
SUM OF ALL RESPONSES TO PHQ9 QUESTIONS 1 & 2: 0
9. THOUGHTS THAT YOU WOULD BE BETTER OFF DEAD, OR OF HURTING YOURSELF: 0
5. POOR APPETITE OR OVEREATING: 0
6. FEELING BAD ABOUT YOURSELF - OR THAT YOU ARE A FAILURE OR HAVE LET YOURSELF OR YOUR FAMILY DOWN: 0
3. TROUBLE FALLING OR STAYING ASLEEP: 0
2. FEELING DOWN, DEPRESSED OR HOPELESS: 0
8. MOVING OR SPEAKING SO SLOWLY THAT OTHER PEOPLE COULD HAVE NOTICED. OR THE OPPOSITE, BEING SO FIGETY OR RESTLESS THAT YOU HAVE BEEN MOVING AROUND A LOT MORE THAN USUAL: 0
SUM OF ALL RESPONSES TO PHQ QUESTIONS 1-9: 0
10. IF YOU CHECKED OFF ANY PROBLEMS, HOW DIFFICULT HAVE THESE PROBLEMS MADE IT FOR YOU TO DO YOUR WORK, TAKE CARE OF THINGS AT HOME, OR GET ALONG WITH OTHER PEOPLE: 0
SUM OF ALL RESPONSES TO PHQ QUESTIONS 1-9: 0
4. FEELING TIRED OR HAVING LITTLE ENERGY: 0
SUM OF ALL RESPONSES TO PHQ QUESTIONS 1-9: 0
1. LITTLE INTEREST OR PLEASURE IN DOING THINGS: 0
SUM OF ALL RESPONSES TO PHQ QUESTIONS 1-9: 0
7. TROUBLE CONCENTRATING ON THINGS, SUCH AS READING THE NEWSPAPER OR WATCHING TELEVISION: 0

## 2023-10-17 ASSESSMENT — ENCOUNTER SYMPTOMS
COUGH: 0
ABDOMINAL DISTENTION: 0
VOMITING: 0
SHORTNESS OF BREATH: 0
CONSTIPATION: 0
CHEST TIGHTNESS: 0
ABDOMINAL PAIN: 0
NAUSEA: 0
WHEEZING: 0
BLOOD IN STOOL: 0
DIARRHEA: 0
BACK PAIN: 0

## 2023-10-17 ASSESSMENT — COLUMBIA-SUICIDE SEVERITY RATING SCALE - C-SSRS
7. DID THIS OCCUR IN THE LAST THREE MONTHS: NO
4. HAVE YOU HAD THESE THOUGHTS AND HAD SOME INTENTION OF ACTING ON THEM?: NO
5. HAVE YOU STARTED TO WORK OUT OR WORKED OUT THE DETAILS OF HOW TO KILL YOURSELF? DO YOU INTEND TO CARRY OUT THIS PLAN?: NO
3. HAVE YOU BEEN THINKING ABOUT HOW YOU MIGHT KILL YOURSELF?: NO

## 2023-10-17 NOTE — PROGRESS NOTES
Visit Information    Have you changed or started any medications since your last visit including any over-the-counter medicines, vitamins, or herbal medicines? no   Have you stopped taking any of your medications? Is so, why? -  no  Are you having any side effects from any of your medications? - no    Have you seen any other physician or provider since your last visit?  no   Have you had any other diagnostic tests since your last visit?  no   Have you been seen in the emergency room and/or had an admission in a hospital since we last saw you?  no   Have you had your routine dental cleaning in the past 6 months?  yes -      Do you have an active MyChart account? If no, what is the barrier?   Yes    Patient Care Team:  Keyla Puga MD as PCP - General (Family Medicine)  Keyla Puga MD as PCP - Empaneled Provider  Sandra Shoemaker APRN - CNP as Nurse Practitioner (Certified Nurse Practitioner)  MATT Landeros CNP (Cardiology)  Henrique Hastings DO as Consulting Physician (Obstetrics & Gynecology)  Jeannette Champion MD as Consulting Physician (Neurology)  Mimi Beltran MD as Consulting Physician (Cardiology)    Medical History Review  Past Medical, Family, and Social History reviewed and does contribute to the patient presenting condition    Health Maintenance   Topic Date Due    COVID-19 Vaccine (6 - Moderna series) 03/24/2022    Flu vaccine (1) 08/01/2023    A1C test (Diabetic or Prediabetic)  08/03/2024    Depression Monitoring  08/03/2024    Lipids  10/13/2024    Cervical cancer screen  09/29/2026    DTaP/Tdap/Td vaccine (2 - Td or Tdap) 02/14/2027    Colorectal Cancer Screen  06/28/2029    Pneumococcal 0-64 years Vaccine  Completed    Hepatitis C screen  Completed    HIV screen  Completed    Hepatitis A vaccine  Aged Out    Hib vaccine  Aged Out    Meningococcal (ACWY) vaccine  Aged Out    Hepatitis B vaccine  Discontinued

## 2023-10-17 NOTE — PROGRESS NOTES
Shahnaz Booth (:  1975) is a 51 y.o. female,Established patient, here for evaluation of the following chief complaint(s): Weight Management (Adipex/drug screen ), Immunizations (No to all vaccines ), and Medication Refill (Adipex )      ASSESSMENT/PLAN:    Celia received counseling on the following healthy behaviors: nutrition, exercise, and medication adherence  Reviewed prior labs and health maintenance  Discussed use, benefit, and side effects of prescribed medications. Barriers to medication compliance addressed. Patient given educational materials - see patient instructions  All patient questions answered. Patient voiced understanding. The patient's past medical,surgical, social, and family history as well as her current medications and allergies were reviewed as documented in today's encounter. Medications, labs, diagnostic studies, consultations and follow-up as documented in this encounter. Celia was seen today for weight management, immunizations and medication refill. Diagnoses and all orders for this visit:    Class 2 obesity without serious comorbidity with body mass index (BMI) of 38.0 to 38.9 in adult, unspecified obesity type  -Improving  -Continue Adipex  -No concerns at this time    Encounter for drug screening  -     POCT Rapid Drug Screen    Prior to Visit Medications    Medication Sig Taking? Authorizing Provider   baclofen (LIORESAL) 10 MG tablet TAKE ONE TABLET BY MOUTH THREE TIMES A DAY AS NEEDED FOR MUSCLE SPASMS. CAUSES SEDATION.  DO NOT DRIVE WHILE ON THIS MEDICATION Yes Mc Garcia MD   pravastatin (PRAVACHOL) 40 MG tablet TAKE ONE TABLET BY MOUTH EVERY EVENING WITH FOOD FOR HIGH CHOLESTEROL Yes Mc Garcia MD   clotrimazole-betamethasone (LOTRISONE) 1-0.05 % cream Apply to affected area bid externally x 4 days then daily for 3 days Yes Jackie Shoemaker, MATT - CNP   rizatriptan (MAXALT) 10 MG tablet Take 1 tablet by mouth as needed for

## 2023-11-10 ENCOUNTER — OFFICE VISIT (OUTPATIENT)
Dept: FAMILY MEDICINE CLINIC | Age: 48
End: 2023-11-10

## 2023-11-10 VITALS
HEIGHT: 64 IN | DIASTOLIC BLOOD PRESSURE: 76 MMHG | WEIGHT: 194 LBS | HEART RATE: 86 BPM | OXYGEN SATURATION: 98 % | SYSTOLIC BLOOD PRESSURE: 128 MMHG | BODY MASS INDEX: 33.12 KG/M2 | TEMPERATURE: 97.4 F

## 2023-11-10 DIAGNOSIS — E78.5 HYPERLIPIDEMIA WITH TARGET LDL LESS THAN 100: ICD-10-CM

## 2023-11-10 DIAGNOSIS — Z51.81 MEDICATION MONITORING ENCOUNTER: ICD-10-CM

## 2023-11-10 DIAGNOSIS — E66.9 CLASS 1 OBESITY WITH SERIOUS COMORBIDITY AND BODY MASS INDEX (BMI) OF 32.0 TO 32.9 IN ADULT, UNSPECIFIED OBESITY TYPE: ICD-10-CM

## 2023-11-10 DIAGNOSIS — R73.9 HYPERGLYCEMIA: Primary | ICD-10-CM

## 2023-11-10 DIAGNOSIS — M25.572 ARTHRALGIA OF FOOT, LEFT: ICD-10-CM

## 2023-11-10 PROBLEM — E66.01 MORBID OBESITY WITH BMI OF 40.0-44.9, ADULT (HCC): Status: RESOLVED | Noted: 2017-05-22 | Resolved: 2023-11-10

## 2023-11-10 LAB
ALCOHOL URINE: NORMAL
AMPHETAMINE SCREEN, URINE: POSITIVE
BARBITURATE SCREEN, URINE: NORMAL
BENZODIAZEPINE SCREEN, URINE: NORMAL
BUPRENORPHINE URINE: NORMAL
COCAINE METABOLITE SCREEN URINE: NORMAL
FENTANYL SCREEN, URINE: NORMAL
GABAPENTIN SCREEN, URINE: NORMAL
MDMA URINE: NORMAL
METHADONE SCREEN, URINE: NORMAL
METHAMPHETAMINE, URINE: NORMAL
OPIATE SCREEN URINE: NORMAL
OXYCODONE SCREEN URINE: NORMAL
PHENCYCLIDINE SCREEN URINE: NORMAL
PROPOXYPHENE SCREEN, URINE: NORMAL
SYNTHETIC CANNABINOIDS(K2) SCREEN, URINE: NORMAL
THC SCREEN, URINE: NORMAL
TRAMADOL SCREEN URINE: NORMAL
TRICYCLIC ANTIDEPRESSANTS, UR: NORMAL

## 2023-11-10 RX ORDER — PHENTERMINE HYDROCHLORIDE 37.5 MG/1
37.5 TABLET ORAL
Qty: 30 TABLET | Refills: 0 | Status: SHIPPED | OUTPATIENT
Start: 2023-11-10 | End: 2023-12-10

## 2023-11-10 RX ORDER — METFORMIN HYDROCHLORIDE 500 MG/1
500 TABLET, EXTENDED RELEASE ORAL
Qty: 30 TABLET | Refills: 0 | Status: SHIPPED | OUTPATIENT
Start: 2023-11-10 | End: 2023-11-10

## 2023-11-10 RX ORDER — DULAGLUTIDE 0.75 MG/.5ML
0.75 INJECTION, SOLUTION SUBCUTANEOUS
Qty: 4 ADJUSTABLE DOSE PRE-FILLED PEN SYRINGE | Refills: 0 | Status: SHIPPED | OUTPATIENT
Start: 2023-11-10

## 2023-11-10 RX ORDER — METFORMIN HYDROCHLORIDE 500 MG/1
500 TABLET, EXTENDED RELEASE ORAL
Qty: 30 TABLET | Refills: 0 | Status: SHIPPED | OUTPATIENT
Start: 2023-11-10

## 2023-11-10 RX ORDER — DULAGLUTIDE 0.75 MG/.5ML
0.75 INJECTION, SOLUTION SUBCUTANEOUS
Qty: 4 ADJUSTABLE DOSE PRE-FILLED PEN SYRINGE | Refills: 0 | Status: SHIPPED | OUTPATIENT
Start: 2023-11-10 | End: 2023-11-10

## 2023-11-10 SDOH — ECONOMIC STABILITY: FOOD INSECURITY: WITHIN THE PAST 12 MONTHS, THE FOOD YOU BOUGHT JUST DIDN'T LAST AND YOU DIDN'T HAVE MONEY TO GET MORE.: NEVER TRUE

## 2023-11-10 SDOH — ECONOMIC STABILITY: FOOD INSECURITY: WITHIN THE PAST 12 MONTHS, YOU WORRIED THAT YOUR FOOD WOULD RUN OUT BEFORE YOU GOT MONEY TO BUY MORE.: NEVER TRUE

## 2023-11-10 SDOH — ECONOMIC STABILITY: INCOME INSECURITY: HOW HARD IS IT FOR YOU TO PAY FOR THE VERY BASICS LIKE FOOD, HOUSING, MEDICAL CARE, AND HEATING?: NOT HARD AT ALL

## 2023-11-10 ASSESSMENT — PATIENT HEALTH QUESTIONNAIRE - PHQ9
3. TROUBLE FALLING OR STAYING ASLEEP: 0
9. THOUGHTS THAT YOU WOULD BE BETTER OFF DEAD, OR OF HURTING YOURSELF: 0
4. FEELING TIRED OR HAVING LITTLE ENERGY: 0
SUM OF ALL RESPONSES TO PHQ QUESTIONS 1-9: 0
1. LITTLE INTEREST OR PLEASURE IN DOING THINGS: 0
6. FEELING BAD ABOUT YOURSELF - OR THAT YOU ARE A FAILURE OR HAVE LET YOURSELF OR YOUR FAMILY DOWN: 0
2. FEELING DOWN, DEPRESSED OR HOPELESS: 0
7. TROUBLE CONCENTRATING ON THINGS, SUCH AS READING THE NEWSPAPER OR WATCHING TELEVISION: 0
SUM OF ALL RESPONSES TO PHQ QUESTIONS 1-9: 0
SUM OF ALL RESPONSES TO PHQ9 QUESTIONS 1 & 2: 0
SUM OF ALL RESPONSES TO PHQ QUESTIONS 1-9: 0
10. IF YOU CHECKED OFF ANY PROBLEMS, HOW DIFFICULT HAVE THESE PROBLEMS MADE IT FOR YOU TO DO YOUR WORK, TAKE CARE OF THINGS AT HOME, OR GET ALONG WITH OTHER PEOPLE: 0
SUM OF ALL RESPONSES TO PHQ QUESTIONS 1-9: 0
8. MOVING OR SPEAKING SO SLOWLY THAT OTHER PEOPLE COULD HAVE NOTICED. OR THE OPPOSITE, BEING SO FIGETY OR RESTLESS THAT YOU HAVE BEEN MOVING AROUND A LOT MORE THAN USUAL: 0
5. POOR APPETITE OR OVEREATING: 0

## 2023-11-10 ASSESSMENT — COLUMBIA-SUICIDE SEVERITY RATING SCALE - C-SSRS
4. HAVE YOU HAD THESE THOUGHTS AND HAD SOME INTENTION OF ACTING ON THEM?: NO
5. HAVE YOU STARTED TO WORK OUT OR WORKED OUT THE DETAILS OF HOW TO KILL YOURSELF? DO YOU INTEND TO CARRY OUT THIS PLAN?: NO
3. HAVE YOU BEEN THINKING ABOUT HOW YOU MIGHT KILL YOURSELF?: NO
7. DID THIS OCCUR IN THE LAST THREE MONTHS: NO

## 2023-11-10 ASSESSMENT — ANXIETY QUESTIONNAIRES
3. WORRYING TOO MUCH ABOUT DIFFERENT THINGS: 0
2. NOT BEING ABLE TO STOP OR CONTROL WORRYING: 0
6. BECOMING EASILY ANNOYED OR IRRITABLE: 0
GAD7 TOTAL SCORE: 0
5. BEING SO RESTLESS THAT IT IS HARD TO SIT STILL: 0
1. FEELING NERVOUS, ANXIOUS, OR ON EDGE: 0
4. TROUBLE RELAXING: 0
7. FEELING AFRAID AS IF SOMETHING AWFUL MIGHT HAPPEN: 0
IF YOU CHECKED OFF ANY PROBLEMS ON THIS QUESTIONNAIRE, HOW DIFFICULT HAVE THESE PROBLEMS MADE IT FOR YOU TO DO YOUR WORK, TAKE CARE OF THINGS AT HOME, OR GET ALONG WITH OTHER PEOPLE: NOT DIFFICULT AT ALL

## 2023-11-10 ASSESSMENT — ENCOUNTER SYMPTOMS
CONSTIPATION: 0
NAUSEA: 0
VOMITING: 0
SHORTNESS OF BREATH: 0
ABDOMINAL PAIN: 0
COUGH: 0
WHEEZING: 0
ABDOMINAL DISTENTION: 0
CHEST TIGHTNESS: 0
DIARRHEA: 0

## 2023-11-10 NOTE — RESULT ENCOUNTER NOTE
Addressed during office visit today, urine drug screen positive for amphetamines as prescribed, Adipex, consistent with treatment,, continue treatment recommended during the office visit.

## 2023-11-10 NOTE — PROGRESS NOTES
Visit Information    Have you changed or started any medications since your last visit including any over-the-counter medicines, vitamins, or herbal medicines? yes -     Have you stopped taking any of your medications? Is so, why? -  no  Are you having any side effects from any of your medications? - no    Have you seen any other physician or provider since your last visit?  no   Have you had any other diagnostic tests since your last visit?  no   Have you been seen in the emergency room and/or had an admission in a hospital since we last saw you?  no   Have you had your routine dental cleaning in the past 6 months?  yes -       Do you have an active MyChart account? If no, what is the barrier?   Yes    Patient Care Team:  Analia Carr MD as PCP - General (Family Medicine)  Analia Carr MD as PCP - Empaneled Provider  Elisa Shoemaker APRN - CNP as Nurse Practitioner (Certified Nurse Practitioner)  MATT Pathak CNP (Cardiology)  Alanna Licona DO as Consulting Physician (Obstetrics & Gynecology)  Tona Desir MD as Consulting Physician (Neurology)  Dilshad Benito MD as Consulting Physician (Cardiology)    Medical History Review  Past Medical, Family, and Social History reviewed and does contribute to the patient presenting condition    Health Maintenance   Topic Date Due    COVID-19 Vaccine (6 - 2023-24 season) 10/01/2024 (Originally 9/1/2023)    Flu vaccine (1) 10/17/2024 (Originally 8/1/2023)    A1C test (Diabetic or Prediabetic)  08/03/2024    Lipids  10/13/2024    Depression Monitoring  10/17/2024    Cervical cancer screen  09/29/2026    DTaP/Tdap/Td vaccine (2 - Td or Tdap) 02/14/2027    Colorectal Cancer Screen  06/28/2029    Pneumococcal 0-64 years Vaccine  Completed    Hepatitis C screen  Completed    HIV screen  Completed    Hepatitis A vaccine  Aged Out    Hib vaccine  Aged Out    Meningococcal (ACWY) vaccine  Aged Out    Hepatitis B vaccine  Discontinued

## 2023-11-10 NOTE — PROGRESS NOTES
Bird Regan (:  1975) is a 51 y.o. female,Established patient, here for evaluation of the following chief complaint(s): Weight Management, Foot Pain (Left foot pain ongoing pain since July ), and Discuss Labs      ASSESSMENT/PLAN:    1. Hyperglycemia  Improving     Lab Results   Component Value Date    LABA1C 5.6 2023    LABA1C 5.8 2022    LABA1C 6.0 2022       -  restart metFORMIN (GLUCOPHAGE-XR) 500 MG extended release tablet; Take 1 tablet by mouth daily (with breakfast), Disp-30 tablet, R-0Normal  -start   Dulaglutide (TRULICITY) 5.66 UA/6.9NS SOPN; Inject 0.75 mg into the skin every 7 days If pen needle is needed, please request, Disp-4 Adjustable Dose Pre-filled Pen Syringe, R-0Normal    -Victoza not covered  Low carb, low fat diet, increase fruits and vegetables, and exercise 4-5 times a week 30-40 minutes a day, or walk 1-2 hours per day, or wear a pedometer and get at least 10,000 steps per day. 2. Class 1 obesity with serious comorbidity and body mass index (BMI) of 32.0 to 32.9 in adult, unspecified obesity type  improved    Wt Readings from Last 3 Encounters:   11/10/23 88 kg (194 lb)   10/17/23 89.9 kg (198 lb 3.2 oz)   23 91.6 kg (202 lb)     09/15/23 204 lb 12.8 oz (92.9 kg)     Continue with last phentermine (ADIPEX-P) 37.5 MG tablet; Take 1 tablet by mouth every morning (before breakfast) for 30 days. Max Daily Amount: 37.5 mg, Disp-30 tablet, R-0Normal  Patient was asked about her current diet and exercise habits, and personalized advice was provided regarding recommended lifestyle changes. Patient's comorbid health conditions associated with elevated BMI were discussed, including dyslipidemia, hyperglycemia, and mood disorder, asthma, varicose veins ,as well as the likely benefits of weight loss.   Based upon patient's motivation to change her behavior, the following plan was agreed upon to work toward a weight loss goal of 1-2 pounds/week: low

## 2023-11-12 ASSESSMENT — ENCOUNTER SYMPTOMS: TROUBLE SWALLOWING: 0

## 2023-11-28 DIAGNOSIS — E55.9 VITAMIN D DEFICIENCY: ICD-10-CM

## 2023-11-28 RX ORDER — ERGOCALCIFEROL 1.25 MG/1
CAPSULE ORAL
Qty: 12 CAPSULE | Refills: 0 | Status: SHIPPED | OUTPATIENT
Start: 2023-11-28 | End: 2023-11-29

## 2023-11-29 RX ORDER — ERGOCALCIFEROL 1.25 MG/1
CAPSULE ORAL
Qty: 12 CAPSULE | Refills: 0 | Status: SHIPPED | OUTPATIENT
Start: 2023-11-29

## 2023-11-29 NOTE — TELEPHONE ENCOUNTER
Please Approve or Refuse.   Send to Pharmacy per Pt's Request:      Next Visit Date:  Visit date not found   Last Visit Date: 11/10/2023    Hemoglobin A1C (%)   Date Value   08/03/2023 5.6   08/26/2022 5.8   04/01/2022 6.0             ( goal A1C is < 7)   BP Readings from Last 3 Encounters:   11/10/23 128/76   10/17/23 126/76   09/29/23 114/68          (goal 120/80)  BUN   Date Value Ref Range Status   10/13/2023 8 6 - 20 mg/dL Final     Creatinine   Date Value Ref Range Status   10/13/2023 0.6 0.5 - 0.9 mg/dL Final     Potassium   Date Value Ref Range Status   10/13/2023 3.8 3.7 - 5.3 mmol/L Final

## 2023-12-05 DIAGNOSIS — R73.9 HYPERGLYCEMIA: ICD-10-CM

## 2023-12-05 RX ORDER — DULAGLUTIDE 0.75 MG/.5ML
INJECTION, SOLUTION SUBCUTANEOUS
Qty: 2 ML | Refills: 0 | OUTPATIENT
Start: 2023-12-05

## 2023-12-05 RX ORDER — DULAGLUTIDE 1.5 MG/.5ML
1.5 INJECTION, SOLUTION SUBCUTANEOUS WEEKLY
Qty: 4 ADJUSTABLE DOSE PRE-FILLED PEN SYRINGE | Refills: 3 | Status: SHIPPED | OUTPATIENT
Start: 2023-12-05 | End: 2024-01-04

## 2023-12-05 NOTE — TELEPHONE ENCOUNTER
Would she like to increase Trulicity to the step 2?   Please check with patient so I can send an increased dosage of Trulicity to the pharmacy

## 2023-12-05 NOTE — TELEPHONE ENCOUNTER
Diagnosis Orders   1.  Hyperglycemia  dulaglutide (TRULICITY) 1.5 GZ/3.0RU SC injection           Future Appointments   Date Time Provider [facility] [address]   12/8/2023  9:00 AM Christelle Casey DPM [facility]   1/2/2024  3:20 PM Yvon Cerna MD Neuro [facility] Neurology -   1/30/2024  1:30 PM [address]

## 2023-12-25 DIAGNOSIS — J45.20 MILD INTERMITTENT ASTHMA WITHOUT COMPLICATION: ICD-10-CM

## 2023-12-26 ENCOUNTER — E-VISIT (OUTPATIENT)
Dept: FAMILY MEDICINE CLINIC | Age: 48
End: 2023-12-26
Payer: COMMERCIAL

## 2023-12-26 DIAGNOSIS — J45.20 MILD INTERMITTENT ASTHMA WITHOUT COMPLICATION: ICD-10-CM

## 2023-12-26 DIAGNOSIS — U07.1 COVID-19 VIRUS INFECTION: Primary | ICD-10-CM

## 2023-12-26 PROCEDURE — 99423 OL DIG E/M SVC 21+ MIN: CPT | Performed by: FAMILY MEDICINE

## 2023-12-26 RX ORDER — ALBUTEROL SULFATE 90 UG/1
2 AEROSOL, METERED RESPIRATORY (INHALATION) EVERY 6 HOURS PRN
Qty: 8 G | Refills: 3 | Status: SHIPPED | OUTPATIENT
Start: 2023-12-26

## 2023-12-26 RX ORDER — BENZONATATE 100 MG/1
100 CAPSULE ORAL 3 TIMES DAILY PRN
Qty: 21 CAPSULE | Refills: 0 | Status: SHIPPED | OUTPATIENT
Start: 2023-12-26 | End: 2024-01-02

## 2023-12-26 RX ORDER — ALBUTEROL SULFATE 90 UG/1
2 AEROSOL, METERED RESPIRATORY (INHALATION) EVERY 6 HOURS PRN
Qty: 8 G | Refills: 3 | OUTPATIENT
Start: 2023-12-26

## 2023-12-26 NOTE — PROGRESS NOTES
Celia Galan (1975) initiated an asynchronous digital communication through [employer]. Date of service: 12/26/2023     HPI: per patient's questionnaire& MyChart message  [address]   to [employer] [facility] (supporting You)         12/26/23  2:29 AM  Can I get a zpack ? up coughing, yellow-green. mucous hot/cold, headaches , runny nose    Lost of voice    Up late coughing spells     EXAM: not applicable    Diagnoses and all orders for this visit:    1. Acute bronchitis due to infection  Will request more information from the patient, when the symptoms started, and to rule out COVID-19 test  - benzonatate (TESSALON PERLES) 100 MG capsule; Take 1 capsule by mouth 3 times daily as needed for Cough  Dispense: 21 capsule; Refill: 0  - COVID-19 Antigen Test KIT; To rule out COVID-19, to do at home  Dispense: 1 kit; Refill: 0    2. Acute non-recurrent sinusitis of other sinus  Will request more information from the patient, when the symptoms started and to rule out COVID-19 test  - benzonatate (TESSALON PERLES) 100 MG capsule; Take 1 capsule by mouth 3 times daily as needed for Cough  Dispense: 21 capsule; Refill: 0  - COVID-19 Antigen Test KIT; To rule out COVID-19, to do at home  Dispense: 1 kit; Refill: 0      No orders of the defined types were placed in this encounter. Addendum made on 12/26/2023 at 1:43 PM  --Lupe Benitez MD on 12/26/2023 at 1:43 PM  Patient reports COVID test was positive  Celia CHAPPELL pn [facility] (supporting You)32 minutes ago (1:10 PM)       Hello. My COVID test results were positive \"      I updated the e-visit diagnosis        Diagnoses and all orders for this visit:  1. COVID-19 virus infection  Due to obesity, and asthma, patient has risk to have more severe form, she would benefit from Paxlovid  - benzonatate (TESSALON PERLES) 100 MG capsule;  Take 1 capsule by mouth 3 times daily as needed for Cough  Dispense: 21

## 2023-12-26 NOTE — TELEPHONE ENCOUNTER
Please Approve or Refuse.   Send to Pharmacy per Pt's Request:      Next Visit Date:  Visit date not found   Last Visit Date: 12/26/2023    Hemoglobin A1C (%)   Date Value   08/03/2023 5.6   08/26/2022 5.8   04/01/2022 6.0             ( goal A1C is < 7)   BP Readings from Last 3 Encounters:   11/10/23 128/76   10/17/23 126/76   09/29/23 114/68          (goal 120/80)  BUN   Date Value Ref Range Status   10/13/2023 8 6 - 20 mg/dL Final     Creatinine   Date Value Ref Range Status   10/13/2023 0.6 0.5 - 0.9 mg/dL Final     Potassium   Date Value Ref Range Status   10/13/2023 3.8 3.7 - 5.3 mmol/L Final

## 2024-01-24 DIAGNOSIS — R73.9 HYPERGLYCEMIA: ICD-10-CM

## 2024-01-24 RX ORDER — METFORMIN HYDROCHLORIDE 500 MG/1
500 TABLET, EXTENDED RELEASE ORAL DAILY
Qty: 90 TABLET | Refills: 3 | Status: SHIPPED | OUTPATIENT
Start: 2024-01-24

## 2024-01-25 RX ORDER — SEMAGLUTIDE 1.34 MG/ML
INJECTION, SOLUTION SUBCUTANEOUS
Qty: 3 ML | Refills: 2 | Status: SHIPPED | OUTPATIENT
Start: 2024-01-25

## 2024-01-28 ENCOUNTER — PATIENT MESSAGE (OUTPATIENT)
Dept: NEUROLOGY | Age: 49
End: 2024-01-28

## 2024-01-29 RX ORDER — RIMEGEPANT SULFATE 75 MG/75MG
75 TABLET, ORALLY DISINTEGRATING ORAL EVERY OTHER DAY
Qty: 15 TABLET | Refills: 5 | Status: SHIPPED | OUTPATIENT
Start: 2024-01-29 | End: 2025-01-20

## 2024-01-29 NOTE — TELEPHONE ENCOUNTER
From: Celia Stephens  To: Dr. Alisia Vanessa  Sent: 1/28/2024 3:03 PM EST  Subject: Prescription refill    Hello. Can you send an authorization for the nurtec 75mg to the pharmacy? It has been work I g very well for me and I only have a few days left. Thanks in advance

## 2024-01-29 NOTE — TELEPHONE ENCOUNTER
Pharmacy requesting refill of Nurtec 75mg.      Medication active on med list yes      Date of last Rx: 9/14/2023 with 2 refills          verified by AIDAN RIVERA      Date of last appointment 9/14/2023    Next Visit Date:  2/15/2024

## 2024-01-30 ENCOUNTER — PROCEDURE VISIT (OUTPATIENT)
Dept: OBGYN CLINIC | Age: 49
End: 2024-01-30
Payer: COMMERCIAL

## 2024-01-30 VITALS
HEART RATE: 74 BPM | SYSTOLIC BLOOD PRESSURE: 118 MMHG | DIASTOLIC BLOOD PRESSURE: 74 MMHG | RESPIRATION RATE: 16 BRPM | BODY MASS INDEX: 39.07 KG/M2 | WEIGHT: 199 LBS | HEIGHT: 60 IN

## 2024-01-30 DIAGNOSIS — N95.1 MENOPAUSAL SYMPTOMS: ICD-10-CM

## 2024-01-30 DIAGNOSIS — N92.6 IRREGULAR MENSES: Primary | ICD-10-CM

## 2024-01-30 PROCEDURE — 99213 OFFICE O/P EST LOW 20 MIN: CPT | Performed by: OBSTETRICS & GYNECOLOGY

## 2024-01-30 NOTE — PROGRESS NOTES
Celia Stephens  2024      Celia Stephens is a 48 y.o. female       The patient was seen today. She was here to follow-up regarding her labs and diagnostics ordered at her last visit for the diagnosis of:    ICD-10-CM    1. Irregular menses  N92.6       2. Menopausal symptoms  N95.1 Follicle Stimulating Hormone     Estradiol          She does not have any specific chief complaint today. Her bowels are regular and she is voiding without difficulty. Pt was seen in 3/2023 with hx of ablation and onset of painful menses. She was to fu 3 weeks after that appt for emb hscope or OR D&C hscope. She missed her fu appts. Currently she is not having any menses since 2023 and has no pain. She has some menopausal symptoms and wishes labs. No bloating today or abdominal pain.      Past Medical History:   Diagnosis Date    Abnormal EKG 2020    Allergic rhinitis due to allergen 2022    Anxiety 2019    Asthma     Current mild episode of major depressive disorder without prior episode ([facility]) 2020    Dyslipidemia (high LDL; low HDL) 2017    Dysmenorrhea 3/24/2023    Heart palpitations 2020    History of endometrial ablation     History of kidney stones 2017    History of tubal ligation     Hyperglycemia 2017    Hypertriglyceridemia 2017    Hypomagnesemia 2020    Migraine 21    Migraine without aura and without status migrainosus, not intractable 2022    Morbid obesity with BMI of 40.0-44.9, adult ([facility]) 2017    Obesity (BMI 30-39.9) 2017    Obesity (BMI 30.0-34.9) 2022    Pre-syncope 2020    Vision abnormalities     glasses/contacts         Past Surgical History:   Procedure Laterality Date    APPENDECTOMY  2021    [facility]    COLONOSCOPY      COLONOSCOPY  2019    COLONOSCOPY N/A 2019    COLORECTAL CANCER SCREENING, NOT HIGH RISK performed by Gonzalo Stone MD at [facility] OR    ENDOMETRIAL ABLATION

## 2024-02-09 ENCOUNTER — HOSPITAL ENCOUNTER (OUTPATIENT)
Age: 49
Discharge: HOME | End: 2024-02-09
Payer: COMMERCIAL

## 2024-02-09 DIAGNOSIS — N95.1 MENOPAUSAL SYMPTOMS: ICD-10-CM

## 2024-02-09 LAB
ESTRADIOL LEVEL: 58 PG/ML
FSH SERPL-ACNC: 4.9 MIU/ML

## 2024-02-09 PROCEDURE — 82670 ASSAY OF TOTAL ESTRADIOL: CPT

## 2024-02-09 PROCEDURE — 83001 ASSAY OF GONADOTROPIN (FSH): CPT

## 2024-02-09 PROCEDURE — 36415 COLL VENOUS BLD VENIPUNCTURE: CPT

## 2024-02-16 ENCOUNTER — HOSPITAL ENCOUNTER (OUTPATIENT)
Dept: ULTRASOUND IMAGING | Age: 49
End: 2024-02-16
Payer: COMMERCIAL

## 2024-02-16 ENCOUNTER — HOSPITAL ENCOUNTER (OUTPATIENT)
Dept: MAMMOGRAPHY | Age: 49
Discharge: HOME | End: 2024-02-16
Payer: COMMERCIAL

## 2024-02-16 VITALS — HEIGHT: 61 IN | BODY MASS INDEX: 33.99 KG/M2 | WEIGHT: 180 LBS

## 2024-02-16 DIAGNOSIS — N63.0 MASS OF BREAST, UNSPECIFIED LATERALITY: ICD-10-CM

## 2024-02-16 DIAGNOSIS — N64.4 BREAST PAIN: ICD-10-CM

## 2024-02-16 DIAGNOSIS — Z12.31 ENCOUNTER FOR SCREENING MAMMOGRAM FOR MALIGNANT NEOPLASM OF BREAST: ICD-10-CM

## 2024-02-16 PROCEDURE — 76642 ULTRASOUND BREAST LIMITED: CPT

## 2024-02-16 PROCEDURE — G0279 TOMOSYNTHESIS, MAMMO: HCPCS

## 2024-03-05 ENCOUNTER — PATIENT MESSAGE (OUTPATIENT)
Dept: FAMILY MEDICINE CLINIC | Age: 49
End: 2024-03-05

## 2024-03-06 ENCOUNTER — TELEMEDICINE (OUTPATIENT)
Dept: FAMILY MEDICINE CLINIC | Age: 49
End: 2024-03-06
Payer: COMMERCIAL

## 2024-03-06 DIAGNOSIS — M25.531 RIGHT WRIST PAIN: Primary | ICD-10-CM

## 2024-03-06 PROCEDURE — 99422 OL DIG E/M SVC 11-20 MIN: CPT | Performed by: NURSE PRACTITIONER

## 2024-03-06 SDOH — ECONOMIC STABILITY: FOOD INSECURITY: WITHIN THE PAST 12 MONTHS, YOU WORRIED THAT YOUR FOOD WOULD RUN OUT BEFORE YOU GOT MONEY TO BUY MORE.: NEVER TRUE

## 2024-03-06 SDOH — ECONOMIC STABILITY: FOOD INSECURITY: WITHIN THE PAST 12 MONTHS, THE FOOD YOU BOUGHT JUST DIDN'T LAST AND YOU DIDN'T HAVE MONEY TO GET MORE.: NEVER TRUE

## 2024-03-06 SDOH — ECONOMIC STABILITY: INCOME INSECURITY: HOW HARD IS IT FOR YOU TO PAY FOR THE VERY BASICS LIKE FOOD, HOUSING, MEDICAL CARE, AND HEATING?: NOT VERY HARD

## 2024-03-06 ASSESSMENT — PATIENT HEALTH QUESTIONNAIRE - PHQ9
7. TROUBLE CONCENTRATING ON THINGS, SUCH AS READING THE NEWSPAPER OR WATCHING TELEVISION: 0
2. FEELING DOWN, DEPRESSED OR HOPELESS: 0
SUM OF ALL RESPONSES TO PHQ9 QUESTIONS 1 & 2: 0
3. TROUBLE FALLING OR STAYING ASLEEP: 0
8. MOVING OR SPEAKING SO SLOWLY THAT OTHER PEOPLE COULD HAVE NOTICED. OR THE OPPOSITE, BEING SO FIGETY OR RESTLESS THAT YOU HAVE BEEN MOVING AROUND A LOT MORE THAN USUAL: 0
9. THOUGHTS THAT YOU WOULD BE BETTER OFF DEAD, OR OF HURTING YOURSELF: 0
SUM OF ALL RESPONSES TO PHQ QUESTIONS 1-9: 1
5. POOR APPETITE OR OVEREATING: 0
10. IF YOU CHECKED OFF ANY PROBLEMS, HOW DIFFICULT HAVE THESE PROBLEMS MADE IT FOR YOU TO DO YOUR WORK, TAKE CARE OF THINGS AT HOME, OR GET ALONG WITH OTHER PEOPLE: 0
SUM OF ALL RESPONSES TO PHQ QUESTIONS 1-9: 1
4. FEELING TIRED OR HAVING LITTLE ENERGY: 1
1. LITTLE INTEREST OR PLEASURE IN DOING THINGS: 0
SUM OF ALL RESPONSES TO PHQ QUESTIONS 1-9: 1
SUM OF ALL RESPONSES TO PHQ QUESTIONS 1-9: 1
6. FEELING BAD ABOUT YOURSELF - OR THAT YOU ARE A FAILURE OR HAVE LET YOURSELF OR YOUR FAMILY DOWN: 0

## 2024-03-06 ASSESSMENT — ENCOUNTER SYMPTOMS
SHORTNESS OF BREATH: 0
VOMITING: 0
ABDOMINAL DISTENTION: 0
BACK PAIN: 0
NAUSEA: 0
CHEST TIGHTNESS: 0
DIARRHEA: 0
COUGH: 0
CONSTIPATION: 0
BLOOD IN STOOL: 0
WHEEZING: 0
ABDOMINAL PAIN: 0

## 2024-03-06 NOTE — TELEPHONE ENCOUNTER
From: Celia Stephens  To: Dr. Lena Barton  Sent: 3/5/2024 6:17 PM EST  Subject: Poss wrist fracture /thinking hair line     I think I have some sort of injury to my right wrist. It has been this way now going on 3 days -today. I am Right handed. I have a hard time with toileting(wiping), eating (when bringing the utensil to my mouth , holding heavy object that shouldn’t be considered heavy (a plate,a book, etc. My wrist even hurts while not doing anything at all. I was wondering if you could order me an x-ray for my right wrist?

## 2024-03-06 NOTE — PROGRESS NOTES
Meningococcal (ACWY) vaccine  Aged Out    Hepatitis B vaccine  Discontinued              
10/13/2023    AST 12 10/13/2023    ALKPHOS 62 10/13/2023    BILITOT 0.2 (L) 10/13/2023       Lab Results   Component Value Date    TSH 2.93 10/13/2023       Lab Results   Component Value Date    CHOL 198 10/13/2023    CHOL 228 (H) 08/26/2022    CHOL 221 (H) 04/01/2022     Lab Results   Component Value Date    TRIG 202 (H) 10/13/2023    TRIG 194 (H) 08/26/2022    TRIG 207 (H) 04/01/2022     Lab Results   Component Value Date    HDL 50 10/13/2023    HDL 59 08/26/2022    HDL 51 04/01/2022     Lab Results   Component Value Date    LDLCHOLESTEROL 108 10/13/2023    LDLCHOLESTEROL 130 08/26/2022    LDLCHOLESTEROL 129 04/01/2022       Lab Results   Component Value Date    CHOLHDLRATIO 4.0 10/13/2023    CHOLHDLRATIO 3.9 08/26/2022    CHOLHDLRATIO 4.3 04/01/2022       Lab Results   Component Value Date    LABA1C 5.6 08/03/2023    LABA1C 5.8 08/26/2022    LABA1C 6.0 04/01/2022         Lab Results   Component Value Date    MHGQVOMA63 523 10/13/2023       Lab Results   Component Value Date    VITD25 35.6 10/13/2023       No orders of the defined types were placed in this encounter.      Orders Placed This Encounter   Procedures    XR WRIST RIGHT (MIN 3 VIEWS)     Standing Status:   Future     Standing Expiration Date:   3/6/2025     Order Specific Question:   Reason for exam:     Answer:   pain, Hx injury       There are no discontinued medications.         Patient was alone yes    Celia Stephens, was evaluated through a synchronous (real-time) audio-video encounter. The patient (or guardian if applicable) is aware that this is a billable service, which includes applicable co-pays. This Virtual Visit was conducted with patient's (and/or legal guardian's) consent. Patient identification was verified, and a caregiver was present when appropriate.   The patient was located at Home: [address] [place] [id number]  Provider was located at Facility (Appt Dept): [address] [place] [phone]      Total

## 2024-03-12 ENCOUNTER — PATIENT MESSAGE (OUTPATIENT)
Dept: FAMILY MEDICINE CLINIC | Age: 49
End: 2024-03-12

## 2024-03-12 DIAGNOSIS — H00.032 CELLULITIS OF RIGHT LOWER EYELID: Primary | ICD-10-CM

## 2024-03-12 DIAGNOSIS — B37.9 YEAST INFECTION: ICD-10-CM

## 2024-03-12 RX ORDER — FLUCONAZOLE 150 MG/1
150 TABLET ORAL
Qty: 3 TABLET | Refills: 0 | Status: SHIPPED | OUTPATIENT
Start: 2024-03-12

## 2024-03-12 RX ORDER — CEFUROXIME AXETIL 500 MG/1
500 TABLET ORAL 2 TIMES DAILY
Qty: 14 TABLET | Refills: 0 | Status: SHIPPED | OUTPATIENT
Start: 2024-03-12 | End: 2024-03-19

## 2024-03-12 RX ORDER — ERYTHROMYCIN 5 MG/G
OINTMENT OPHTHALMIC
Qty: 1 G | Refills: 0 | Status: SHIPPED | OUTPATIENT
Start: 2024-03-12 | End: 2024-03-22

## 2024-03-12 NOTE — TELEPHONE ENCOUNTER
From: Celia Stephens  To: Dr. Lena Barton  Sent: 3/12/2024 1:14 AM EDT  Subject: Stye    Here is a picture

## 2024-03-27 ENCOUNTER — OFFICE VISIT (OUTPATIENT)
Dept: FAMILY MEDICINE CLINIC | Age: 49
End: 2024-03-27
Payer: COMMERCIAL

## 2024-03-27 ENCOUNTER — HOSPITAL ENCOUNTER (OUTPATIENT)
Age: 49
Discharge: HOME | End: 2024-03-27
Payer: COMMERCIAL

## 2024-03-27 VITALS
OXYGEN SATURATION: 98 % | HEIGHT: 64 IN | SYSTOLIC BLOOD PRESSURE: 142 MMHG | HEART RATE: 78 BPM | WEIGHT: 200 LBS | BODY MASS INDEX: 34.15 KG/M2 | DIASTOLIC BLOOD PRESSURE: 94 MMHG | TEMPERATURE: 97.2 F

## 2024-03-27 DIAGNOSIS — R73.03 PREDIABETES: ICD-10-CM

## 2024-03-27 DIAGNOSIS — Z11.59 SCREENING FOR RUBELLA: ICD-10-CM

## 2024-03-27 DIAGNOSIS — F41.9 ANXIETY: ICD-10-CM

## 2024-03-27 DIAGNOSIS — Z02.0 SCHOOL PHYSICAL EXAM: ICD-10-CM

## 2024-03-27 DIAGNOSIS — F32.5 MAJOR DEPRESSIVE DISORDER WITH SINGLE EPISODE, IN FULL REMISSION (HCC): ICD-10-CM

## 2024-03-27 DIAGNOSIS — Z00.00 ENCOUNTER FOR WELL ADULT EXAM WITHOUT ABNORMAL FINDINGS: Primary | ICD-10-CM

## 2024-03-27 DIAGNOSIS — G43.009 MIGRAINE WITHOUT AURA AND WITHOUT STATUS MIGRAINOSUS, NOT INTRACTABLE: ICD-10-CM

## 2024-03-27 DIAGNOSIS — Z71.89 ACP (ADVANCE CARE PLANNING): ICD-10-CM

## 2024-03-27 LAB — RUBV IGG SERPL QL IA: 414 IU/ML

## 2024-03-27 PROCEDURE — 86735 MUMPS ANTIBODY: CPT

## 2024-03-27 PROCEDURE — 86762 RUBELLA ANTIBODY: CPT

## 2024-03-27 PROCEDURE — 86765 RUBEOLA ANTIBODY: CPT

## 2024-03-27 PROCEDURE — 36415 COLL VENOUS BLD VENIPUNCTURE: CPT

## 2024-03-27 PROCEDURE — 99396 PREV VISIT EST AGE 40-64: CPT | Performed by: NURSE PRACTITIONER

## 2024-03-27 SDOH — ECONOMIC STABILITY: INCOME INSECURITY: HOW HARD IS IT FOR YOU TO PAY FOR THE VERY BASICS LIKE FOOD, HOUSING, MEDICAL CARE, AND HEATING?: NOT HARD AT ALL

## 2024-03-27 SDOH — ECONOMIC STABILITY: FOOD INSECURITY: WITHIN THE PAST 12 MONTHS, YOU WORRIED THAT YOUR FOOD WOULD RUN OUT BEFORE YOU GOT MONEY TO BUY MORE.: NEVER TRUE

## 2024-03-27 SDOH — ECONOMIC STABILITY: FOOD INSECURITY: WITHIN THE PAST 12 MONTHS, THE FOOD YOU BOUGHT JUST DIDN'T LAST AND YOU DIDN'T HAVE MONEY TO GET MORE.: NEVER TRUE

## 2024-03-27 ASSESSMENT — PATIENT HEALTH QUESTIONNAIRE - PHQ9
2. FEELING DOWN, DEPRESSED OR HOPELESS: NOT AT ALL
5. POOR APPETITE OR OVEREATING: NOT AT ALL
10. IF YOU CHECKED OFF ANY PROBLEMS, HOW DIFFICULT HAVE THESE PROBLEMS MADE IT FOR YOU TO DO YOUR WORK, TAKE CARE OF THINGS AT HOME, OR GET ALONG WITH OTHER PEOPLE: NOT DIFFICULT AT ALL
4. FEELING TIRED OR HAVING LITTLE ENERGY: NEARLY EVERY DAY
SUM OF ALL RESPONSES TO PHQ QUESTIONS 1-9: 3
7. TROUBLE CONCENTRATING ON THINGS, SUCH AS READING THE NEWSPAPER OR WATCHING TELEVISION: NOT AT ALL
9. THOUGHTS THAT YOU WOULD BE BETTER OFF DEAD, OR OF HURTING YOURSELF: NOT AT ALL
SUM OF ALL RESPONSES TO PHQ QUESTIONS 1-9: 3
SUM OF ALL RESPONSES TO PHQ QUESTIONS 1-9: 3
6. FEELING BAD ABOUT YOURSELF - OR THAT YOU ARE A FAILURE OR HAVE LET YOURSELF OR YOUR FAMILY DOWN: NOT AT ALL
SUM OF ALL RESPONSES TO PHQ QUESTIONS 1-9: 3
8. MOVING OR SPEAKING SO SLOWLY THAT OTHER PEOPLE COULD HAVE NOTICED. OR THE OPPOSITE, BEING SO FIGETY OR RESTLESS THAT YOU HAVE BEEN MOVING AROUND A LOT MORE THAN USUAL: NOT AT ALL
SUM OF ALL RESPONSES TO PHQ9 QUESTIONS 1 & 2: 0
3. TROUBLE FALLING OR STAYING ASLEEP: NOT AT ALL
1. LITTLE INTEREST OR PLEASURE IN DOING THINGS: NOT AT ALL

## 2024-03-27 ASSESSMENT — ENCOUNTER SYMPTOMS
WHEEZING: 0
COUGH: 0
SHORTNESS OF BREATH: 0
BACK PAIN: 0
BLOOD IN STOOL: 0
DIARRHEA: 0
ABDOMINAL PAIN: 0
VOMITING: 0
CHEST TIGHTNESS: 0
CONSTIPATION: 0
ABDOMINAL DISTENTION: 0
NAUSEA: 0

## 2024-03-27 NOTE — PROGRESS NOTES
Visit Information    Have you changed or started any medications since your last visit including any over-the-counter medicines, vitamins, or herbal medicines? yes -    Have you stopped taking any of your medications? Is so, why? -  yes -   Are you having any side effects from any of your medications? - no    Have you seen any other physician or provider since your last visit?  no   Have you had any other diagnostic tests since your last visit?  no   Have you been seen in the emergency room and/or had an admission in a hospital since we last saw you?  no   Have you had your routine dental cleaning in the past 6 months?  yes -      Do you have an active MyChart account? If no, what is the barrier?  Yes    Patient Care Team:  Lena Barton MD as PCP - General (Family Medicine)  Lena Barton MD as PCP - Empaneled Provider  Lottie Shoemaker APRN - CNP as Nurse Practitioner (Certified Nurse Practitioner)  Navid Paula APRN - CNP (Cardiology)  David Martin DO as Consulting Physician (Obstetrics & Gynecology)  Alisia Vanessa MD as Consulting Physician (Neurology)  Hilary Duarte MD as Consulting Physician (Cardiology)    Medical History Review  Past Medical, Family, and Social History reviewed and does contribute to the patient presenting condition    Health Maintenance   Topic Date Due    COVID-19 Vaccine (6 - 2023-24 season) 09/01/2023    Shingles vaccine (1 of 2) Never done    A1C test (Diabetic or Prediabetic)  08/03/2024    Lipids  10/13/2024    Breast cancer screen  02/16/2025    Depression Monitoring  03/06/2025    Cervical cancer screen  09/29/2026    DTaP/Tdap/Td vaccine (2 - Td or Tdap) 02/14/2027    Colorectal Cancer Screen  06/28/2029    Flu vaccine  Completed    Pneumococcal 0-64 years Vaccine  Completed    Hepatitis C screen  Completed    HIV screen  Completed    Hepatitis A vaccine  Aged Out    Hib vaccine  Aged Out    Polio vaccine  Aged Out    Meningococcal (ACWY) vaccine  Aged 
vaccine  Aged Out    Hib vaccine  Aged Out    Polio vaccine  Aged Out    Meningococcal (ACWY) vaccine  Aged Out    Hepatitis B vaccine  Discontinued     Recommendations for Preventive Services Due: see orders and patient instructions/AVS.    Return in about 2 weeks (around 4/10/2024) for blood pressure check only, to bring her cuff and log with her .      Advance Care Planning   Discussed the patient’s choices for care and treatment preferences in case of a health event that adversely affects decision-making abilities or is life-limiting. Recommended the patient document care preferences in state-specific advance directives. Also reviewed the process of designating a trusted capable adult as an Agent (or Health Care Power of ) to make health care decisions for the patient if the patient becomes unable due to incapacity. Patient was asked to complete advance directive forms, if not already done, and to provide a dated, signed and witnessed (or notarized) copy, per the forms' requirements, to the practice office.    Time spent (minutes): <16 minutes (Non-Billable)

## 2024-03-27 NOTE — PATIENT INSTRUCTIONS
weight may be enough to improve your health.  Get family and friends involved to provide support. Talk to them about why you are trying to lose weight, and ask them to help. They can help by participating in exercise and having meals with you, even if they may be eating something different.  Find what works best for you. If you do not have time or do not like to cook, a program that offers meal replacement bars or shakes may be better for you. Or if you like to prepare meals, finding a plan that includes daily menus and recipes may be best.  Ask your doctor about other health professionals who can help you achieve your weight loss goals.  A dietitian can help you make healthy changes in your diet.  An exercise specialist or  can help you develop a safe and effective exercise program.  A counselor or psychiatrist can help you cope with issues such as depression, anxiety, or family problems that can make it hard to focus on weight loss.  Consider joining a support group for people who are trying to lose weight. Your doctor can suggest groups in your area.  Where can you learn more?  Go to https://[email or web address].net/patientEd and enter U357 to learn more about \"Starting a Weight Loss Plan: Care Instructions.\"  Current as of: September 20, 2023               Content Version: 14.0  © 2006-2024 [employer].   Care instructions adapted under license by [employer]. If you have questions about a medical condition or this instruction, always ask your healthcare professional. [employer] disclaims any warranty or liability for your use of this information.           Well Visit, Ages 18 to 65: Care Instructions  Well visits can help you stay healthy. Your doctor has checked your overall health and may have suggested ways to take good care of yourself. Your doctor also may have recommended tests. You can help prevent illness with healthy eating, good sleep, vaccinations, regular

## 2024-03-29 ENCOUNTER — TELEPHONE (OUTPATIENT)
Dept: FAMILY MEDICINE CLINIC | Age: 49
End: 2024-03-29

## 2024-03-29 LAB
MEV IGG SER-ACNC: 2.36
MUV IGG SER IA-ACNC: 2.05

## 2024-07-09 ENCOUNTER — OFFICE VISIT (OUTPATIENT)
Dept: FAMILY MEDICINE CLINIC | Age: 49
End: 2024-07-09
Payer: COMMERCIAL

## 2024-07-09 DIAGNOSIS — R73.03 PREDIABETES: ICD-10-CM

## 2024-07-09 DIAGNOSIS — E55.9 VITAMIN D DEFICIENCY: ICD-10-CM

## 2024-07-09 DIAGNOSIS — R53.82 CHRONIC FATIGUE: Primary | ICD-10-CM

## 2024-07-09 DIAGNOSIS — J45.40 MODERATE PERSISTENT ASTHMA WITHOUT COMPLICATION: ICD-10-CM

## 2024-07-09 DIAGNOSIS — E78.5 HYPERLIPIDEMIA WITH TARGET LDL LESS THAN 100: ICD-10-CM

## 2024-07-09 DIAGNOSIS — E66.01 SEVERE OBESITY (BMI 35.0-39.9) WITH COMORBIDITY (HCC): ICD-10-CM

## 2024-07-09 LAB — HBA1C MFR BLD: 5.6 %

## 2024-07-09 PROCEDURE — G8427 DOCREV CUR MEDS BY ELIG CLIN: HCPCS | Performed by: FAMILY MEDICINE

## 2024-07-09 PROCEDURE — G8417 CALC BMI ABV UP PARAM F/U: HCPCS | Performed by: FAMILY MEDICINE

## 2024-07-09 PROCEDURE — 1036F TOBACCO NON-USER: CPT | Performed by: FAMILY MEDICINE

## 2024-07-09 PROCEDURE — 3017F COLORECTAL CA SCREEN DOC REV: CPT | Performed by: FAMILY MEDICINE

## 2024-07-09 PROCEDURE — 83036 HEMOGLOBIN GLYCOSYLATED A1C: CPT | Performed by: FAMILY MEDICINE

## 2024-07-09 PROCEDURE — 99214 OFFICE O/P EST MOD 30 MIN: CPT | Performed by: FAMILY MEDICINE

## 2024-07-09 RX ORDER — PRAVASTATIN SODIUM 40 MG
TABLET ORAL
Qty: 90 TABLET | Refills: 0
Start: 2024-07-09

## 2024-07-09 RX ORDER — BUPROPION HYDROCHLORIDE 150 MG/1
150 TABLET ORAL EVERY MORNING
Qty: 30 TABLET | Refills: 3 | Status: SHIPPED | OUTPATIENT
Start: 2024-07-09

## 2024-07-09 RX ORDER — BUDESONIDE AND FORMOTEROL FUMARATE DIHYDRATE 80; 4.5 UG/1; UG/1
2 AEROSOL RESPIRATORY (INHALATION) 2 TIMES DAILY
Qty: 10.2 G | Refills: 3 | Status: SHIPPED | OUTPATIENT
Start: 2024-07-09

## 2024-07-09 ASSESSMENT — PATIENT HEALTH QUESTIONNAIRE - PHQ9
SUM OF ALL RESPONSES TO PHQ QUESTIONS 1-9: 0
SUM OF ALL RESPONSES TO PHQ9 QUESTIONS 1 & 2: 0
2. FEELING DOWN, DEPRESSED OR HOPELESS: NOT AT ALL
SUM OF ALL RESPONSES TO PHQ QUESTIONS 1-9: 0
1. LITTLE INTEREST OR PLEASURE IN DOING THINGS: NOT AT ALL

## 2024-07-09 NOTE — PROGRESS NOTES
Celia Stephens (:  1975) is a 49 y.o. female,Established patient, here for evaluation of the following chief complaint(s): Weight Management (WEIGHT GAIN), Discuss Medications (WEIGHT-LOSS MEDICATION ), Hyperglycemia, Hyperlipidemia, Fatigue, and Asthma      ASSESSMENT/PLAN:    1. Chronic fatigue  Ongoing  Will do basic labs to rule out certain common medical conditions: hematologic, renal, hepatic, electrolyte imbalances, thyroid disorders.    -     CBC; Future  -     Comprehensive Metabolic Panel; Future  -     Uric Acid; Future  -     TSH; Future  2. Prediabetes  improved  -     POCT glycosylated hemoglobin (Hb A1C) 5.6  Lab Results   Component Value Date    LABA1C 5.6 2024    LABA1C 5.6 2023    LABA1C 5.8 2022   Could not tolerate metformin, gave her severe diarrhea  Recheck labs  She would benefit from Wegovy, due to multiple comorbidities    -     Vitamin B12 & Folate; Future  -     Semaglutide-Weight Management (WEGOVY) 0.25 MG/0.5ML SOAJ SC injection; Inject 0.25 mg into the skin every 7 days, Disp-2 mL, R-0Normal  -     Insulin, total; Future  Low carb, low fat diet, increase fruits and vegetables, and exercise 4-5 times a week 30-40 minutes a day, or walk 1-2 hours per day, or wear a pedometer and get at least 10,000 steps per day.    3. Severe obesity (BMI 35.0-39.9) with comorbidity (HCC)  Improved  Will refer to weight loss clinic, will start Wellbutrin to help her lose weight  -     Erin Nguyen, MARCEL, [facility]  -    start  buPROPion (WELLBUTRIN XL) 150 MG extended release tablet; Take 1 tablet by mouth every morning, Disp-30 tablet, R-3Normal  Low carb, low fat diet, increase fruits and vegetables, and exercise 4-5 times a week 30-40 minutes a day, or walk 1-2 hours per day, or wear a pedometer and get at least 10,000 steps per day.    4. Moderate persistent asthma without complication  Inadequately controlled  worsening  To

## 2024-07-09 NOTE — PROGRESS NOTES
Visit Information    Have you changed or started any medications since your last visit including any over-the-counter medicines, vitamins, or herbal medicines? yes -    Have you stopped taking any of your medications? Is so, why? -  yes -   Are you having any side effects from any of your medications? - no    Have you seen any other physician or provider since your last visit?  no   Have you had any other diagnostic tests since your last visit?  no   Have you been seen in the emergency room and/or had an admission in a hospital since we last saw you?  no   Have you had your routine dental cleaning in the past 6 months?  yes -      Do you have an active MyChart account? If no, what is the barrier?  Yes    Patient Care Team:  Lena Barton MD as PCP - General (Family Medicine)  Lena Barton MD as PCP - Empaneled Provider  Lottie Shoemaker APRN - CNP as Nurse Practitioner (Certified Nurse Practitioner)  Navid Paula APRN - CNP (Cardiology)  David Martin DO as Consulting Physician (Obstetrics & Gynecology)  Alisia Vanessa MD as Consulting Physician (Neurology)  Hilary Duarte MD as Consulting Physician (Cardiology)    Medical History Review  Past Medical, Family, and Social History reviewed and does contribute to the patient presenting condition    Health Maintenance   Topic Date Due    COVID-19 Vaccine (6 - 2023-24 season) 09/01/2023    Shingles vaccine (1 of 2) Never done    A1C test (Diabetic or Prediabetic)  08/03/2024    Flu vaccine (1) 08/01/2024    Lipids  10/13/2024    Breast cancer screen  02/16/2025    Depression Monitoring  03/27/2025    Cervical cancer screen  09/29/2026    DTaP/Tdap/Td vaccine (2 - Td or Tdap) 02/14/2027    Colorectal Cancer Screen  06/28/2029    Pneumococcal 0-64 years Vaccine  Completed    Hepatitis C screen  Completed    HIV screen  Completed    Hepatitis A vaccine  Aged Out    Hib vaccine  Aged Out    Polio vaccine  Aged Out    Meningococcal (ACWY) vaccine

## 2024-07-09 NOTE — RESULT ENCOUNTER NOTE
Addressed during office visit today, stable hemoglobin A1c 5.6 but borderline high    Low carb, low fat diet, increase fruits and vegetables, and exercise 4-5 times a week 30-40 minutes a day, or walk 1-2 hours per day, or wear a pedometer and get at least 10,000 steps per day.

## 2024-07-12 ENCOUNTER — PATIENT MESSAGE (OUTPATIENT)
Dept: FAMILY MEDICINE CLINIC | Age: 49
End: 2024-07-12

## 2024-07-15 ENCOUNTER — PATIENT MESSAGE (OUTPATIENT)
Dept: FAMILY MEDICINE CLINIC | Age: 49
End: 2024-07-15

## 2024-07-15 VITALS
HEIGHT: 61 IN | DIASTOLIC BLOOD PRESSURE: 86 MMHG | HEART RATE: 74 BPM | BODY MASS INDEX: 37.91 KG/M2 | TEMPERATURE: 97.8 F | OXYGEN SATURATION: 98 % | SYSTOLIC BLOOD PRESSURE: 116 MMHG | WEIGHT: 200.8 LBS

## 2024-07-17 NOTE — TELEPHONE ENCOUNTER
Tasia, Processor 7/16/2024 9:13 PM EDT    Right. I know, I kind of figured they will keep fighting it. I do have an appt with the weight clinic in October

## 2024-08-02 ENCOUNTER — HOSPITAL ENCOUNTER (OUTPATIENT)
Age: 49
Discharge: HOME | End: 2024-08-02
Payer: COMMERCIAL

## 2024-08-02 DIAGNOSIS — R73.03 PREDIABETES: ICD-10-CM

## 2024-08-02 DIAGNOSIS — E78.5 HYPERLIPIDEMIA WITH TARGET LDL LESS THAN 100: ICD-10-CM

## 2024-08-02 DIAGNOSIS — R53.82 CHRONIC FATIGUE: ICD-10-CM

## 2024-08-02 DIAGNOSIS — E55.9 VITAMIN D DEFICIENCY: ICD-10-CM

## 2024-08-02 LAB
25(OH)D3 SERPL-MCNC: 23.3 NG/ML (ref 30–100)
ALBUMIN SERPL-MCNC: 4.2 G/DL (ref 3.5–5.2)
ALP SERPL-CCNC: 74 U/L (ref 35–104)
ALT SERPL-CCNC: 14 U/L (ref 5–33)
ANION GAP SERPL CALCULATED.3IONS-SCNC: 9 MMOL/L (ref 9–17)
AST SERPL-CCNC: 9 U/L
BILIRUB SERPL-MCNC: 0.2 MG/DL (ref 0.3–1.2)
BUN SERPL-MCNC: 10 MG/DL (ref 6–20)
CALCIUM SERPL-MCNC: 9.3 MG/DL (ref 8.6–10.4)
CHLORIDE SERPL-SCNC: 103 MMOL/L (ref 98–107)
CHOLEST SERPL-MCNC: 238 MG/DL
CHOLESTEROL/HDL RATIO: 5.7
CO2 SERPL-SCNC: 27 MMOL/L (ref 20–31)
CREAT SERPL-MCNC: 0.6 MG/DL (ref 0.5–0.9)
ERYTHROCYTE [DISTWIDTH] IN BLOOD BY AUTOMATED COUNT: 13.6 % (ref 11.5–14.9)
FOLATE SERPL-MCNC: 11.2 NG/ML (ref 4.8–24.2)
GFR, ESTIMATED: >90 ML/MIN/1.73M2
GLUCOSE SERPL-MCNC: 119 MG/DL (ref 70–99)
HCT VFR BLD AUTO: 38.6 % (ref 36–46)
HDLC SERPL-MCNC: 42 MG/DL
HGB BLD-MCNC: 12.9 G/DL (ref 12–16)
INSULIN REFERENCE RANGE:: NORMAL
INSULIN: 22.2 MU/L
LDLC SERPL CALC-MCNC: 125 MG/DL (ref 0–130)
MCH RBC QN AUTO: 30.8 PG (ref 26–34)
MCHC RBC AUTO-ENTMCNC: 33.4 G/DL (ref 31–37)
MCV RBC AUTO: 92.3 FL (ref 80–100)
PLATELET # BLD AUTO: 287 K/UL (ref 150–450)
PMV BLD AUTO: 8.3 FL (ref 6–12)
POTASSIUM SERPL-SCNC: 4.6 MMOL/L (ref 3.7–5.3)
PROT SERPL-MCNC: 7.7 G/DL (ref 6.4–8.3)
RBC # BLD AUTO: 4.19 M/UL (ref 4–5.2)
SODIUM SERPL-SCNC: 139 MMOL/L (ref 135–144)
TRIGL SERPL-MCNC: 355 MG/DL
TSH SERPL DL<=0.05 MIU/L-ACNC: 1.42 UIU/ML (ref 0.3–5)
URATE SERPL-MCNC: 6.1 MG/DL (ref 2.4–5.7)
VIT B12 SERPL-MCNC: 665 PG/ML (ref 232–1245)
WBC OTHER # BLD: 5.3 K/UL (ref 3.5–11)

## 2024-08-02 PROCEDURE — 83525 ASSAY OF INSULIN: CPT

## 2024-08-02 PROCEDURE — 82306 VITAMIN D 25 HYDROXY: CPT

## 2024-08-02 PROCEDURE — 80053 COMPREHEN METABOLIC PANEL: CPT

## 2024-08-02 PROCEDURE — 85027 COMPLETE CBC AUTOMATED: CPT

## 2024-08-02 PROCEDURE — 36415 COLL VENOUS BLD VENIPUNCTURE: CPT

## 2024-08-02 PROCEDURE — 82607 VITAMIN B-12: CPT

## 2024-08-02 PROCEDURE — 84443 ASSAY THYROID STIM HORMONE: CPT

## 2024-08-02 PROCEDURE — 80061 LIPID PANEL: CPT

## 2024-08-02 PROCEDURE — 84550 ASSAY OF BLOOD/URIC ACID: CPT

## 2024-08-02 PROCEDURE — 82746 ASSAY OF FOLIC ACID SERUM: CPT

## 2024-08-05 DIAGNOSIS — E78.2 MIXED HYPERLIPIDEMIA: Primary | ICD-10-CM

## 2024-08-05 DIAGNOSIS — E55.9 VITAMIN D DEFICIENCY: ICD-10-CM

## 2024-08-05 RX ORDER — ATORVASTATIN CALCIUM 20 MG/1
20 TABLET, FILM COATED ORAL DAILY
Qty: 90 TABLET | Refills: 1 | Status: SHIPPED | OUTPATIENT
Start: 2024-08-05

## 2024-08-05 RX ORDER — ERGOCALCIFEROL 1.25 MG/1
50000 CAPSULE ORAL WEEKLY
Qty: 12 CAPSULE | Refills: 0 | Status: SHIPPED | OUTPATIENT
Start: 2024-08-05

## 2024-09-25 ENCOUNTER — HOSPITAL ENCOUNTER (OUTPATIENT)
Age: 49
Setting detail: SPECIMEN
Discharge: HOME | End: 2024-09-25

## 2024-09-25 ENCOUNTER — OFFICE VISIT (OUTPATIENT)
Dept: OBGYN CLINIC | Age: 49
End: 2024-09-25
Payer: COMMERCIAL

## 2024-09-25 ENCOUNTER — TELEPHONE (OUTPATIENT)
Dept: FAMILY MEDICINE CLINIC | Age: 49
End: 2024-09-25

## 2024-09-25 VITALS
BODY MASS INDEX: 36.82 KG/M2 | SYSTOLIC BLOOD PRESSURE: 108 MMHG | DIASTOLIC BLOOD PRESSURE: 72 MMHG | HEIGHT: 61 IN | WEIGHT: 195 LBS

## 2024-09-25 DIAGNOSIS — Z11.51 SPECIAL SCREENING EXAMINATION FOR HUMAN PAPILLOMAVIRUS (HPV): ICD-10-CM

## 2024-09-25 DIAGNOSIS — Z13.79 ENCOUNTER FOR GENETIC SCREENING: Primary | ICD-10-CM

## 2024-09-25 DIAGNOSIS — N89.8 VAGINAL IRRITATION: ICD-10-CM

## 2024-09-25 DIAGNOSIS — Z86.19 HISTORY OF GENITAL WARTS: ICD-10-CM

## 2024-09-25 DIAGNOSIS — Z01.419 WELL FEMALE EXAM WITH ROUTINE GYNECOLOGICAL EXAM: Primary | ICD-10-CM

## 2024-09-25 PROCEDURE — 99213 OFFICE O/P EST LOW 20 MIN: CPT | Performed by: NURSE PRACTITIONER

## 2024-09-25 PROCEDURE — 99396 PREV VISIT EST AGE 40-64: CPT | Performed by: NURSE PRACTITIONER

## 2024-09-25 PROCEDURE — G8427 DOCREV CUR MEDS BY ELIG CLIN: HCPCS | Performed by: NURSE PRACTITIONER

## 2024-09-25 PROCEDURE — 3017F COLORECTAL CA SCREEN DOC REV: CPT | Performed by: NURSE PRACTITIONER

## 2024-09-25 PROCEDURE — G8417 CALC BMI ABV UP PARAM F/U: HCPCS | Performed by: NURSE PRACTITIONER

## 2024-09-25 PROCEDURE — 1036F TOBACCO NON-USER: CPT | Performed by: NURSE PRACTITIONER

## 2024-09-25 RX ORDER — CLOTRIMAZOLE AND BETAMETHASONE DIPROPIONATE 10; .64 MG/G; MG/G
CREAM TOPICAL
Qty: 45 G | Refills: 1 | Status: SHIPPED | OUTPATIENT
Start: 2024-09-25

## 2024-10-02 LAB — CYTOLOGY REPORT: NORMAL

## 2024-10-14 ENCOUNTER — TELEPHONE (OUTPATIENT)
Dept: BARIATRICS/WEIGHT MGMT | Age: 49
End: 2024-10-14

## 2024-11-06 ENCOUNTER — HOSPITAL ENCOUNTER (OUTPATIENT)
Age: 49
Setting detail: SPECIMEN
Discharge: HOME | End: 2024-11-06

## 2024-11-06 ENCOUNTER — OFFICE VISIT (OUTPATIENT)
Dept: OBGYN CLINIC | Age: 49
End: 2024-11-06
Payer: COMMERCIAL

## 2024-11-06 VITALS
HEIGHT: 60 IN | DIASTOLIC BLOOD PRESSURE: 90 MMHG | SYSTOLIC BLOOD PRESSURE: 130 MMHG | BODY MASS INDEX: 38.68 KG/M2 | WEIGHT: 197 LBS

## 2024-11-06 DIAGNOSIS — N89.8 VAGINAL DISCHARGE: Primary | ICD-10-CM

## 2024-11-06 DIAGNOSIS — N89.8 VAGINAL ODOR: ICD-10-CM

## 2024-11-06 DIAGNOSIS — N89.8 VAGINAL DISCHARGE: ICD-10-CM

## 2024-11-06 PROCEDURE — 1036F TOBACCO NON-USER: CPT | Performed by: NURSE PRACTITIONER

## 2024-11-06 PROCEDURE — 99213 OFFICE O/P EST LOW 20 MIN: CPT | Performed by: NURSE PRACTITIONER

## 2024-11-06 PROCEDURE — G8484 FLU IMMUNIZE NO ADMIN: HCPCS | Performed by: NURSE PRACTITIONER

## 2024-11-06 PROCEDURE — 3017F COLORECTAL CA SCREEN DOC REV: CPT | Performed by: NURSE PRACTITIONER

## 2024-11-06 PROCEDURE — G8417 CALC BMI ABV UP PARAM F/U: HCPCS | Performed by: NURSE PRACTITIONER

## 2024-11-06 PROCEDURE — G8427 DOCREV CUR MEDS BY ELIG CLIN: HCPCS | Performed by: NURSE PRACTITIONER

## 2024-11-06 NOTE — PROGRESS NOTES
Celia Stephens  2024    YOB: 1975          The patient was seen today. She is here regarding vaginal odor x 3 days. States she had intercourse with partner and vaginal odor noted to smell like urine. States she was bleeding on her perineum after intercourse- only spotting with wiping- has stopped . Her bowels are regular and she is voiding without difficulty.     HPI:  Celia Stephens is a 49 y.o. female  vaginal odor      OB History    Para Term  AB Living   5 4 4 0 1 4   SAB IAB Ectopic Molar Multiple Live Births   0 1 0 0 0 4      # Outcome Date GA Lbr Robert/2nd Weight Sex Type Anes PTL Lv   5 IAB        N    4 Term      Vag-Spont  N MONTY   3 Term      Vag-Spont  N MONTY   2 Term      Vag-Spont  N MONTY   1 Term      Vag-Spont  N MONTY       Past Medical History:   Diagnosis Date    Abnormal EKG 2020    Allergic rhinitis due to allergen 2022    Anxiety 2019    Asthma     Current mild episode of major depressive disorder without prior episode (HCC) 2020    Dyslipidemia (high LDL; low HDL) 2017    Dysmenorrhea 3/24/2023    Heart palpitations 2020    History of endometrial ablation     History of kidney stones 2017    History of tubal ligation     Hyperglycemia 2017    Hypertriglyceridemia 2017    Hypomagnesemia 2020    Migraine 22    Migraine without aura and without status migrainosus, not intractable 2022    Morbid obesity with BMI of 40.0-44.9, adult 2017    Obesity (BMI 30-39.9) 2017    Obesity (BMI 30.0-34.9) 2022    Pre-syncope 2020    Vision abnormalities     glasses/contacts       Past Surgical History:   Procedure Laterality Date    APPENDECTOMY  2021    [facility]    COLONOSCOPY      COLONOSCOPY  2019    COLONOSCOPY N/A 2019    COLORECTAL CANCER SCREENING, NOT HIGH RISK performed by [facility]    ENDOMETRIAL ABLATION      ENDOMETRIAL

## 2024-11-07 ENCOUNTER — PATIENT MESSAGE (OUTPATIENT)
Dept: OBGYN CLINIC | Age: 49
End: 2024-11-07

## 2024-11-07 LAB
C TRACH DNA SPEC QL PROBE+SIG AMP: NEGATIVE
CANDIDA SPECIES: NEGATIVE
GARDNERELLA VAGINALIS: POSITIVE
N GONORRHOEA DNA SPEC QL PROBE+SIG AMP: NEGATIVE
SOURCE: ABNORMAL
SPECIMEN DESCRIPTION: NORMAL
TRICHOMONAS: NEGATIVE

## 2024-11-08 ENCOUNTER — TELEPHONE (OUTPATIENT)
Dept: OBGYN CLINIC | Age: 49
End: 2024-11-08

## 2024-11-08 RX ORDER — METRONIDAZOLE 500 MG/1
500 TABLET ORAL 2 TIMES DAILY
Qty: 14 TABLET | Refills: 0 | Status: SHIPPED | OUTPATIENT
Start: 2024-11-08 | End: 2024-11-15

## 2024-11-08 NOTE — TELEPHONE ENCOUNTER
----- Message from MATT Perea NP sent at 11/7/2024 10:27 AM EST -----  + BV  Flagyl 500mg PO BID x 7 days

## 2024-11-08 NOTE — TELEPHONE ENCOUNTER
----- Message from MATT Perea NP sent at 11/7/2024 10:27 AM EST -----  + BV  Flagyl 500mg PO BID x 7 days   ----- Message from MATT Perea NP sent at 11/7/2024 10:27 AM EST -----  + BV  Flagyl 500mg PO BID x 7 days       [facility] OB/GYN Result Note Patient Contact Communication   [facility] [place] [address]      11/08/24   8:13 AM     Patients Name: Celia Stephens   Medical Record Number: [id number]   Contact Serial Number: [id number]  YOB: 1975       Patients Phone Number: [phone]     Description of Recommendations:  The patient was contacted and her identity was confirmed with two of the specific identifiers listed above.  The information regarding her recent testing results and provider recommendations were reviewed with her in detail and all questions were answered.   Recent labs/Cultures/ Imaging Studies/Pathology reports and Urinalysis results are included:      Recommendations given by provider:  ----- Message from MATT Perea NP sent at 11/7/2024 10:27 AM EST -----  + BV  Flagyl 500mg PO BID x 7 days      Pt notified via [employer].    The patient verbalized understanding of the information above and the recommendation by the provider. She will contact her PCP if any other questions arise or contact our office for any other clarifications.        Electronically signed by BELKIS MENSAH RN on 11/8/2024 at 8:13 AM

## 2024-11-10 ENCOUNTER — PATIENT MESSAGE (OUTPATIENT)
Dept: FAMILY MEDICINE CLINIC | Age: 49
End: 2024-11-10

## 2024-11-10 DIAGNOSIS — M79.662 TENDERNESS OF LEFT CALF: Primary | ICD-10-CM

## 2024-11-13 ENCOUNTER — HOSPITAL ENCOUNTER (OUTPATIENT)
Age: 49
Discharge: HOME | End: 2024-11-13
Payer: COMMERCIAL

## 2024-11-13 DIAGNOSIS — M79.662 TENDERNESS OF LEFT CALF: ICD-10-CM

## 2024-11-13 LAB — D DIMER PPP FEU-MCNC: <0.27 UG/ML FEU (ref 0–0.59)

## 2024-11-13 PROCEDURE — 36415 COLL VENOUS BLD VENIPUNCTURE: CPT

## 2024-11-13 PROCEDURE — 85379 FIBRIN DEGRADATION QUANT: CPT

## 2024-11-13 NOTE — RESULT ENCOUNTER NOTE
Tasia comment sent to patient.  Normal D-dimer, no blood clots    Muscle spasm most likely  Future Appointments  11/15/2024 3:00 PM    Lena Barton MD    [facility] DEP  11/20/2024 11:00 AM   Bladimir Encarnacion APRN - CNP   [facility] DEP  9/29/2025  10:30 AM   Lottie Shoemaker APRN - CNP  [facility]        TOP

## 2024-11-15 ENCOUNTER — OFFICE VISIT (OUTPATIENT)
Dept: FAMILY MEDICINE CLINIC | Age: 49
End: 2024-11-15
Payer: COMMERCIAL

## 2024-11-15 VITALS
WEIGHT: 198 LBS | HEIGHT: 60 IN | BODY MASS INDEX: 38.87 KG/M2 | SYSTOLIC BLOOD PRESSURE: 120 MMHG | OXYGEN SATURATION: 98 % | DIASTOLIC BLOOD PRESSURE: 84 MMHG | HEART RATE: 78 BPM

## 2024-11-15 DIAGNOSIS — R73.03 PREDIABETES: Primary | ICD-10-CM

## 2024-11-15 DIAGNOSIS — E66.01 SEVERE OBESITY (BMI 35.0-39.9) WITH COMORBIDITY: ICD-10-CM

## 2024-11-15 DIAGNOSIS — Z86.0100 HISTORY OF COLONIC POLYPS: ICD-10-CM

## 2024-11-15 DIAGNOSIS — G43.009 MIGRAINE WITHOUT AURA AND WITHOUT STATUS MIGRAINOSUS, NOT INTRACTABLE: ICD-10-CM

## 2024-11-15 DIAGNOSIS — F41.9 ANXIETY: ICD-10-CM

## 2024-11-15 DIAGNOSIS — E78.2 MIXED HYPERLIPIDEMIA: ICD-10-CM

## 2024-11-15 DIAGNOSIS — M79.605 PAIN OF LEFT LOWER EXTREMITY: ICD-10-CM

## 2024-11-15 LAB — HBA1C MFR BLD: 5.9 %

## 2024-11-15 PROCEDURE — G8417 CALC BMI ABV UP PARAM F/U: HCPCS | Performed by: FAMILY MEDICINE

## 2024-11-15 PROCEDURE — 1036F TOBACCO NON-USER: CPT | Performed by: FAMILY MEDICINE

## 2024-11-15 PROCEDURE — 99214 OFFICE O/P EST MOD 30 MIN: CPT | Performed by: FAMILY MEDICINE

## 2024-11-15 PROCEDURE — G8484 FLU IMMUNIZE NO ADMIN: HCPCS | Performed by: FAMILY MEDICINE

## 2024-11-15 PROCEDURE — 83036 HEMOGLOBIN GLYCOSYLATED A1C: CPT | Performed by: FAMILY MEDICINE

## 2024-11-15 PROCEDURE — 3017F COLORECTAL CA SCREEN DOC REV: CPT | Performed by: FAMILY MEDICINE

## 2024-11-15 PROCEDURE — G8427 DOCREV CUR MEDS BY ELIG CLIN: HCPCS | Performed by: FAMILY MEDICINE

## 2024-11-15 RX ORDER — BUSPIRONE HYDROCHLORIDE 10 MG/1
10 TABLET ORAL 3 TIMES DAILY PRN
Qty: 90 TABLET | Refills: 0 | Status: SHIPPED | OUTPATIENT
Start: 2024-11-15 | End: 2024-12-15

## 2024-11-15 RX ORDER — ONDANSETRON 4 MG/1
4 TABLET, FILM COATED ORAL EVERY 6 HOURS PRN
Qty: 24 TABLET | Refills: 0 | Status: SHIPPED | OUTPATIENT
Start: 2024-11-15

## 2024-11-15 RX ORDER — PRAVASTATIN SODIUM 20 MG
20 TABLET ORAL
Qty: 90 TABLET | Refills: 2 | Status: SHIPPED | OUTPATIENT
Start: 2024-11-15

## 2024-11-15 RX ORDER — SUMATRIPTAN 50 MG/1
50 TABLET, FILM COATED ORAL
Qty: 9 TABLET | Refills: 3 | Status: SHIPPED | OUTPATIENT
Start: 2024-11-15 | End: 2024-11-15

## 2024-11-15 RX ORDER — METFORMIN HYDROCHLORIDE 500 MG/1
500 TABLET, EXTENDED RELEASE ORAL
Qty: 30 TABLET | Refills: 3 | Status: SHIPPED | OUTPATIENT
Start: 2024-11-15

## 2024-11-15 SDOH — ECONOMIC STABILITY: FOOD INSECURITY: WITHIN THE PAST 12 MONTHS, THE FOOD YOU BOUGHT JUST DIDN'T LAST AND YOU DIDN'T HAVE MONEY TO GET MORE.: NEVER TRUE

## 2024-11-15 SDOH — ECONOMIC STABILITY: FOOD INSECURITY: WITHIN THE PAST 12 MONTHS, YOU WORRIED THAT YOUR FOOD WOULD RUN OUT BEFORE YOU GOT MONEY TO BUY MORE.: NEVER TRUE

## 2024-11-15 SDOH — ECONOMIC STABILITY: INCOME INSECURITY: HOW HARD IS IT FOR YOU TO PAY FOR THE VERY BASICS LIKE FOOD, HOUSING, MEDICAL CARE, AND HEATING?: NOT HARD AT ALL

## 2024-11-15 ASSESSMENT — PATIENT HEALTH QUESTIONNAIRE - PHQ9
7. TROUBLE CONCENTRATING ON THINGS, SUCH AS READING THE NEWSPAPER OR WATCHING TELEVISION: NOT AT ALL
SUM OF ALL RESPONSES TO PHQ9 QUESTIONS 1 & 2: 0
3. TROUBLE FALLING OR STAYING ASLEEP: NOT AT ALL
SUM OF ALL RESPONSES TO PHQ QUESTIONS 1-9: 0
SUM OF ALL RESPONSES TO PHQ QUESTIONS 1-9: 0
1. LITTLE INTEREST OR PLEASURE IN DOING THINGS: NOT AT ALL
5. POOR APPETITE OR OVEREATING: NOT AT ALL
9. THOUGHTS THAT YOU WOULD BE BETTER OFF DEAD, OR OF HURTING YOURSELF: NOT AT ALL
SUM OF ALL RESPONSES TO PHQ QUESTIONS 1-9: 0
SUM OF ALL RESPONSES TO PHQ QUESTIONS 1-9: 0
2. FEELING DOWN, DEPRESSED OR HOPELESS: NOT AT ALL
6. FEELING BAD ABOUT YOURSELF - OR THAT YOU ARE A FAILURE OR HAVE LET YOURSELF OR YOUR FAMILY DOWN: NOT AT ALL
4. FEELING TIRED OR HAVING LITTLE ENERGY: NOT AT ALL
8. MOVING OR SPEAKING SO SLOWLY THAT OTHER PEOPLE COULD HAVE NOTICED. OR THE OPPOSITE, BEING SO FIGETY OR RESTLESS THAT YOU HAVE BEEN MOVING AROUND A LOT MORE THAN USUAL: NOT AT ALL
10. IF YOU CHECKED OFF ANY PROBLEMS, HOW DIFFICULT HAVE THESE PROBLEMS MADE IT FOR YOU TO DO YOUR WORK, TAKE CARE OF THINGS AT HOME, OR GET ALONG WITH OTHER PEOPLE: NOT DIFFICULT AT ALL

## 2024-11-15 NOTE — ASSESSMENT & PLAN NOTE
Worsening, will start buspirone as needed only  Relaxation techniques discussed  Orders:    busPIRone (BUSPAR) 10 MG tablet; Take 1 tablet by mouth 3 times daily as needed (anxiety)

## 2024-11-15 NOTE — ASSESSMENT & PLAN NOTE
Chronic, worsening  Low carb, low fat diet, increase fruits and vegetables, and exercise 4-5 times a week 30-40 minutes a day, or walk 1-2 hours per day, or wear a pedometer and get at least 10,000 steps per day.  Restart metformin, will also try Trulicity, if insurance covers, this should be beneficial for her to help improve her prediabetes and prevent development of diabetes      Lab Results   Component Value Date    LABA1C 5.9 11/15/2024    LABA1C 5.6 07/09/2024    LABA1C 5.6 08/03/2023         Orders:    POCT glycosylated hemoglobin (Hb A1C)    dulaglutide (TRULICITY) 0.75 MG/0.5ML SOAJ SC injection; Inject 0.5 mLs into the skin once a week    metFORMIN (GLUCOPHAGE-XR) 500 MG extended release tablet; Take 1 tablet by mouth daily (with breakfast)

## 2024-11-15 NOTE — PROGRESS NOTES
Visit Information    Have you changed or started any medications since your last visit including any over-the-counter medicines, vitamins, or herbal medicines? no   Are you having any side effects from any of your medications? -  no  Have you stopped taking any of your medications? Is so, why? -  no    Have you seen any other physician or provider since your last visit? No  Have you had any other diagnostic tests since your last visit? No  Have you been seen in the emergency room and/or had an admission to a hospital since we last saw you? No  Have you had your routine dental cleaning in the past 6 months? yes     Have you activated your [employer] account? If not, what are your barriers? Yes     Patient Care Team:  Lena Barton MD as PCP - General (Family Medicine)  Lena Barton MD as PCP - Empaneled Provider  Lottie Shoemaker APRN - CNP as Nurse Practitioner (Certified Nurse Practitioner)  Navid Paula APRN - CNP (Cardiology)  David Martin DO as Consulting Physician (Obstetrics & Gynecology)  Alisia Vanessa MD as Consulting Physician (Neurology)  Hilary Duarte MD as Consulting Physician (Cardiology)    Medical History Review  Past Medical, Family, and Social History reviewed and does contribute to the patient presenting condition    Health Maintenance   Topic Date Due    Shingles vaccine (1 of 2) Never done    COVID-19 Vaccine (6 - 2023-24 season) 09/01/2024    Breast cancer screen  02/16/2025    A1C test (Diabetic or Prediabetic)  07/09/2025    Depression Monitoring  07/09/2025    Lipids  08/02/2025    DTaP/Tdap/Td vaccine (2 - Td or Tdap) 02/14/2027    Cervical cancer screen  09/25/2027    Colorectal Cancer Screen  06/28/2029    Flu vaccine  Completed    Pneumococcal 0-64 years Vaccine  Completed    Hepatitis C screen  Completed    HIV screen  Completed    Hepatitis A vaccine  Aged Out    Hib vaccine  Aged Out    Polio vaccine  Aged Out    Meningococcal (ACWY) vaccine  Aged Out

## 2024-11-15 NOTE — RESULT ENCOUNTER NOTE
Addressed during office visit today, hemoglobin A1c 5.9, worsening prediabetes, continue treatment recommended during the office visit.     Future Appointments  1/16/2025  10:00 AM   Lena Barton MD    [facility] DEP  2/13/2025  9:00 AM    Lena Barton MD    [facility]  3/13/2025  3:30 PM    Lena Barton MD    [facility] DE[facility]  9/29/2025  10:30 AM   Lottie Shoemaker APRN - CNP  [facility]        [facility]

## 2024-11-15 NOTE — PROGRESS NOTES
Celia Stephens (:  1975) is a 49 y.o. female,Established patient, here for evaluation of the following chief complaint(s): Asthma, Hyperglycemia, Leg Pain (Left leg unable to bend knee 100%), Weight Management, and Discuss Medications      ASSESSMENT/PLAN:    Assessment & Plan  Prediabetes  Chronic, worsening  Low carb, low fat diet, increase fruits and vegetables, and exercise 4-5 times a week 30-40 minutes a day, or walk 1-2 hours per day, or wear a pedometer and get at least 10,000 steps per day.  Restart metformin, will also try Trulicity, if insurance covers, this should be beneficial for her to help improve her prediabetes and prevent development of diabetes      Lab Results   Component Value Date    LABA1C 5.9 11/15/2024    LABA1C 5.6 2024    LABA1C 5.6 2023         Orders:    POCT glycosylated hemoglobin (Hb A1C)    dulaglutide (TRULICITY) 0.75 MG/0.5ML SOAJ SC injection; Inject 0.5 mLs into the skin once a week    metFORMIN (GLUCOPHAGE-XR) 500 MG extended release tablet; Take 1 tablet by mouth daily (with breakfast)    Migraine without aura and without status migrainosus, not intractable  Chronic, intermittent    Restart preventative medications and abortive medications  Nurtec ordered by neurology is not approved  MRI brain 2022 was normal  Increase fluids, rest, to stay up-to-date with ophthalmologic exam  Orders:    SUMAtriptan (IMITREX) 50 MG tablet; Take 1 tablet by mouth once as needed for Migraine (max of 3 per week)    ondansetron (ZOFRAN) 4 MG tablet; Take 1 tablet by mouth every 6 hours as needed for Nausea, Vomiting or Other (headache)    Magnesium Oxide (MAGNESIUM-OXIDE) 250 MG TABS tablet; Take 1 tablet by mouth Daily with supper    Riboflavin 100 MG TABS; Take 100 mg by mouth daily VITAMIN B2    Mixed hyperlipidemia     Chronic, inadequately controlled, she thinks is causing her to have muscle cramps, restart smaller dosage of statin, lifestyle changes discussed

## 2024-11-15 NOTE — ASSESSMENT & PLAN NOTE
Chronic, intermittent    Restart preventative medications and abortive medications  Nurtec ordered by neurology is not approved  MRI brain 9/9/2022 was normal  Increase fluids, rest, to stay up-to-date with ophthalmologic exam  Orders:    SUMAtriptan (IMITREX) 50 MG tablet; Take 1 tablet by mouth once as needed for Migraine (max of 3 per week)    ondansetron (ZOFRAN) 4 MG tablet; Take 1 tablet by mouth every 6 hours as needed for Nausea, Vomiting or Other (headache)    Magnesium Oxide (MAGNESIUM-OXIDE) 250 MG TABS tablet; Take 1 tablet by mouth Daily with supper    Riboflavin 100 MG TABS; Take 100 mg by mouth daily VITAMIN B2

## 2024-11-18 ENCOUNTER — PATIENT MESSAGE (OUTPATIENT)
Dept: OBGYN CLINIC | Age: 49
End: 2024-11-18

## 2024-11-18 ENCOUNTER — PREP FOR PROCEDURE (OUTPATIENT)
Dept: GASTROENTEROLOGY | Age: 49
End: 2024-11-18

## 2024-11-18 ENCOUNTER — TELEPHONE (OUTPATIENT)
Dept: GASTROENTEROLOGY | Age: 49
End: 2024-11-18

## 2024-11-18 DIAGNOSIS — Z12.11 COLON CANCER SCREENING: ICD-10-CM

## 2024-11-18 RX ORDER — METRONIDAZOLE 500 MG/1
500 TABLET ORAL 2 TIMES DAILY
Qty: 14 TABLET | Refills: 0 | Status: SHIPPED | OUTPATIENT
Start: 2024-11-18 | End: 2024-11-25

## 2024-11-18 NOTE — TELEPHONE ENCOUNTER
Procedure scheduled/Dr Stone  Procedure:COLON  Dx:SCREENING  Date:03/14/2025  Time:730 AM   Hospital:[facility]  [facility] phone call:TBD  Bowel Prep instructions given:SUPREP  In office/via phone: PHONE   Clearance needed:NONE

## 2024-11-20 RX ORDER — SODIUM, POTASSIUM,MAG SULFATES 17.5-3.13G
SOLUTION, RECONSTITUTED, ORAL ORAL
Qty: 1 EACH | Refills: 0 | Status: SHIPPED | OUTPATIENT
Start: 2024-11-20

## 2024-11-22 PROBLEM — E78.2 MIXED HYPERLIPIDEMIA: Status: ACTIVE | Noted: 2017-05-22

## 2024-11-22 NOTE — ASSESSMENT & PLAN NOTE
Patient is due for colonoscopy, she is agreeable for referral    Orders:    Gonzalo Burroughs MD, Gastroenterology, [place]

## 2024-11-22 NOTE — ASSESSMENT & PLAN NOTE
Chronic, inadequately controlled, she thinks is causing her to have muscle cramps, restart smaller dosage of statin, lifestyle changes discussed again, low-carb diet, low-fat diet, exercise and attempt to lose weight  Lab Results   Component Value Date     08/02/2024         Orders:    pravastatin (PRAVACHOL) 20 MG tablet; Take 1 tablet by mouth daily (before dinner)

## 2024-11-22 NOTE — ASSESSMENT & PLAN NOTE
Chronic, not at goal (unstable), intensive lifestyle changes discussed low-carb diet, low-fat diet, exercise every day  Reprinted referral for [facility] weight loss, to make appointment  Restart metformin, and intensive lifestyle changes discussed and return after the first of the year for Adipex which did help her before  Actually she has lost 2 pounds since July, advised to continue to try to keep the weight down  Wt Readings from Last 3 Encounters:   11/15/24 89.8 kg (198 lb)   11/06/24 89.4 kg (197 lb)   09/25/24 88.5 kg (195 lb)         Wt Readings from Last 3 Encounters:   07/09/24 91.1 kg (200 lb 12.8 oz)

## 2024-12-18 PROBLEM — Z12.11 COLON CANCER SCREENING: Status: RESOLVED | Noted: 2024-11-18 | Resolved: 2024-12-18

## 2025-01-09 ENCOUNTER — TELEPHONE (OUTPATIENT)
Dept: GASTROENTEROLOGY | Age: 50
End: 2025-01-09

## 2025-01-09 NOTE — TELEPHONE ENCOUNTER
Patient calling to get appointment with Dr. Stone for diarrhea since Thanksgiving, cramping, right lower area close to groin sharp pains upper left quadrant constand dull pain and lots of gas.  Please contact patient at [phone]

## 2025-01-10 ENCOUNTER — HOSPITAL ENCOUNTER (OUTPATIENT)
Age: 50
Setting detail: SPECIMEN
Discharge: HOME | End: 2025-01-10

## 2025-01-10 ENCOUNTER — TELEMEDICINE (OUTPATIENT)
Dept: OBGYN CLINIC | Age: 50
End: 2025-01-10
Payer: COMMERCIAL

## 2025-01-10 ENCOUNTER — OFFICE VISIT (OUTPATIENT)
Dept: GASTROENTEROLOGY | Age: 50
End: 2025-01-10
Payer: COMMERCIAL

## 2025-01-10 VITALS
TEMPERATURE: 97.6 F | OXYGEN SATURATION: 100 % | RESPIRATION RATE: 18 BRPM | DIASTOLIC BLOOD PRESSURE: 90 MMHG | BODY MASS INDEX: 38.48 KG/M2 | HEIGHT: 60 IN | HEART RATE: 73 BPM | WEIGHT: 196 LBS | SYSTOLIC BLOOD PRESSURE: 137 MMHG

## 2025-01-10 DIAGNOSIS — K80.20 CALCULUS OF GALLBLADDER WITHOUT CHOLECYSTITIS WITHOUT OBSTRUCTION: ICD-10-CM

## 2025-01-10 DIAGNOSIS — R19.7 DIARRHEA, UNSPECIFIED TYPE: ICD-10-CM

## 2025-01-10 DIAGNOSIS — N99.85 POST ENDOMETRIAL ABLATION SYNDROME: Primary | ICD-10-CM

## 2025-01-10 DIAGNOSIS — R93.89 ENDOMETRIAL THICKENING ON ULTRASOUND: ICD-10-CM

## 2025-01-10 DIAGNOSIS — D25.9 UTERINE LEIOMYOMA, UNSPECIFIED LOCATION: ICD-10-CM

## 2025-01-10 DIAGNOSIS — Z12.11 COLON CANCER SCREENING: ICD-10-CM

## 2025-01-10 DIAGNOSIS — E73.9 LACTOSE INTOLERANCE: ICD-10-CM

## 2025-01-10 DIAGNOSIS — R19.7 DIARRHEA, UNSPECIFIED TYPE: Primary | ICD-10-CM

## 2025-01-10 DIAGNOSIS — Z98.51 HISTORY OF TUBAL LIGATION: ICD-10-CM

## 2025-01-10 DIAGNOSIS — R14.0 BLOATING: ICD-10-CM

## 2025-01-10 DIAGNOSIS — Z98.890 HISTORY OF ENDOMETRIAL ABLATION: ICD-10-CM

## 2025-01-10 LAB
GLIADIN IGA SER IA-ACNC: NORMAL U/ML
GLIADIN IGG SER IA-ACNC: NORMAL U/ML
IGA SERPL-MCNC: 395 MG/DL (ref 70–400)
TTG IGA SER IA-ACNC: NORMAL U/ML

## 2025-01-10 PROCEDURE — G8417 CALC BMI ABV UP PARAM F/U: HCPCS | Performed by: PHYSICIAN ASSISTANT

## 2025-01-10 PROCEDURE — 99213 OFFICE O/P EST LOW 20 MIN: CPT | Performed by: OBSTETRICS & GYNECOLOGY

## 2025-01-10 PROCEDURE — 99204 OFFICE O/P NEW MOD 45 MIN: CPT | Performed by: PHYSICIAN ASSISTANT

## 2025-01-10 PROCEDURE — 3017F COLORECTAL CA SCREEN DOC REV: CPT | Performed by: OBSTETRICS & GYNECOLOGY

## 2025-01-10 PROCEDURE — 1036F TOBACCO NON-USER: CPT | Performed by: PHYSICIAN ASSISTANT

## 2025-01-10 PROCEDURE — G8427 DOCREV CUR MEDS BY ELIG CLIN: HCPCS | Performed by: PHYSICIAN ASSISTANT

## 2025-01-10 PROCEDURE — 3017F COLORECTAL CA SCREEN DOC REV: CPT | Performed by: PHYSICIAN ASSISTANT

## 2025-01-10 PROCEDURE — G8428 CUR MEDS NOT DOCUMENT: HCPCS | Performed by: OBSTETRICS & GYNECOLOGY

## 2025-01-10 ASSESSMENT — ENCOUNTER SYMPTOMS
DIARRHEA: 1
VOMITING: 0
ABDOMINAL PAIN: 1
BLOOD IN STOOL: 0
CONSTIPATION: 1
ABDOMINAL DISTENTION: 1
NAUSEA: 0
ANAL BLEEDING: 0
RECTAL PAIN: 0

## 2025-01-10 NOTE — PROGRESS NOTES
GI CLINIC FOLLOW UP    INTERVAL HISTORY:   No referring provider defined for this encounter.    Chief Complaint   Patient presents with    Follow-up    Diarrhea     Going on for 2 mo    Abdominal Pain     LUQ pain and bloating    Constipation     Only has had 3 formed stools in last 2 weeks. Hyper active bowel sound       HISTORY OF PRESENT ILLNESS: Ms.Crystal MAGY Stephens is a 49 y.o. female , referred for evaluation of diarrhea, cholelithiasis, abd pain    Here for f/u   She is a new return  Last seen 6/28/19 for colonoscopy w 5 yr recall    Today she reports concerns about diarrhea and abdominal pain.   The pain is located in her LUQ and is associated with increased bloating and gas. It is worse after eating and improves with BM. No RUQ pain after eating.   She has been having having 6-8 BM in a day, including BM within 10-15 minutes of eating meals regardless of what she eats. She does have lactose intolerance, but she has been avoiding dairy products. Only had 1 episode of nighttime diarrhea. No fecal incontinence. No black/bloody stools. She was recently on abx - z solange in December. She has been eating Activia yogurt    Denies change in appetite, unintentional weight loss, dysphagia/odynophagia, n/v, heartburn, constipation, black or bloody stools.       Last EGD:   Last colonoscopy: 6/28/19- normal, good prep  Labs: 12/6/24 - vit d 25.6, hyperlipidemia; A1c 6.1% normal TSH, B12, LFTs, no anemia  Last abd imaging:   MRI MRCP - 12/31/24 -  *  Normal gallbladder.  No evidence of biliary ductal dilatation or obstruction.  There is a tiny echogenic calculus in the gallbladder on recent ultrasound examination.  This tiny could not be appreciated on MRCP pulse sequence.  Pancreatic duct is not enlarged.   *  No evidence of intra-abdominal mass or abnormal enhancing lesion.     MRI pelvis 1/3/25 - no GI findings      PAST MEDICAL HISTORY:  Past Medical History:   Diagnosis Date    Abnormal EKG 02/13/2020    Allergic

## 2025-01-10 NOTE — PROGRESS NOTES
Chronic fatigue 02/13/2020    Hot flashes 02/13/2020    Major depressive disorder with single episode, in full remission (HCC) 02/13/2020    Anxiety 09/20/2019    History of colonic polyps 03/10/2019    Slow transit constipation 03/10/2019    Asthma, exercise induced 10/19/2018    Snoring 07/02/2018    Mixed hyperlipidemia 05/22/2017    Hyperglycemia 05/22/2017    Prediabetes 05/22/2017    History of kidney stones 05/22/2017    Herpes simplex type 1 infection 05/23/2016 5/23/2016 + herpes simplex type I IgG antibody         PLAN:  Return in about 4 weeks (around 2/7/2025) for Surgical Boarding.  PLAN D&C Hscope Myosure Evacuation of endometrial cavity.    Clearance NO  SX ticket complete    Return to the office in 4 weeks.  Barrier recommendations and STD counseling was completed  Counseled on preventative health maintenance follow-up.  No orders of the defined types were placed in this encounter.            The patient, Celia Stephens is a 49 y.o. female, was seen with a total time spent of 20 minutes for the visit on this date of service by the E/M provider. The time component had both face to face and non face to face time spent in determining the total time component.  Counseling and education regarding her diagnosis listed below and her options regarding those diagnoses were also included in determining her time component.     1. Post endometrial ablation syndrome    2. Uterine leiomyoma, unspecified location    3. Endometrial thickening on ultrasound    4. History of endometrial ablation    5. History of tubal ligation            The patient had her preventative health maintenance recommendations and follow-up reviewed with her at the completion of her visit.          Celia Stephens, was evaluated through a synchronous (real-time) audio-video encounter. The patient (or guardian if applicable) is aware that this is a billable service, which includes applicable co-pays. This Virtual Visit was conducted

## 2025-01-11 LAB
C DIFF GDH + TOXINS A+B STL QL IA.RAPID: NEGATIVE
CAMPYLOBACTER DNA SPEC NAA+PROBE: NORMAL
ETEC ELTA+ESTB GENES STL QL NAA+PROBE: NORMAL
P SHIGELLOIDES DNA STL QL NAA+PROBE: NORMAL
SALMONELLA DNA SPEC QL NAA+PROBE: NORMAL
SHIGA TOXIN STX GENE SPEC NAA+PROBE: NORMAL
SHIGELLA DNA SPEC QL NAA+PROBE: NORMAL
SPECIMEN DESCRIPTION: NORMAL
SPECIMEN DESCRIPTION: NORMAL
V CHOL+PARA RFBL+TRKH+TNAA STL QL NAA+PR: NORMAL
Y ENTERO RECN STL QL NAA+PROBE: NORMAL

## 2025-01-14 ENCOUNTER — PATIENT MESSAGE (OUTPATIENT)
Dept: GASTROENTEROLOGY | Age: 50
End: 2025-01-14

## 2025-01-14 LAB
GLIADIN IGA SER IA-ACNC: 26 U/ML
GLIADIN IGG SER IA-ACNC: 0.8 U/ML
IGA SERPL-MCNC: 395 MG/DL (ref 70–400)
TTG IGA SER IA-ACNC: 1.9 U/ML

## 2025-01-15 LAB
CALPROTECTIN, FECAL: 9 UG/G
FECAL PANCREATIC ELASTASE-1: >800 UG/G

## 2025-01-21 ENCOUNTER — PREP FOR PROCEDURE (OUTPATIENT)
Dept: OBGYN CLINIC | Age: 50
End: 2025-01-21

## 2025-01-21 DIAGNOSIS — N99.85 POST ENDOMETRIAL ABLATION SYNDROME: ICD-10-CM

## 2025-01-21 DIAGNOSIS — R93.89 THICKENED ENDOMETRIUM: ICD-10-CM

## 2025-01-21 DIAGNOSIS — Z01.818 PREOP TESTING: Primary | ICD-10-CM

## 2025-01-21 PROBLEM — Z12.11 COLON CANCER SCREENING: Status: ACTIVE | Noted: 2024-11-18

## 2025-01-21 PROBLEM — D25.9 UTERINE LEIOMYOMA: Status: ACTIVE | Noted: 2025-01-21

## 2025-01-21 PROBLEM — R19.7 DIARRHEA: Status: ACTIVE | Noted: 2024-11-18

## 2025-01-21 PROBLEM — R14.0 BLOATING: Status: ACTIVE | Noted: 2024-11-18

## 2025-01-21 PROBLEM — R76.8 ELEVATED ANTI-TISSUE TRANSGLUTAMINASE (TTG) IGA LEVEL: Status: ACTIVE | Noted: 2024-11-18

## 2025-01-21 ASSESSMENT — PATIENT HEALTH QUESTIONNAIRE - PHQ9
10. IF YOU CHECKED OFF ANY PROBLEMS, HOW DIFFICULT HAVE THESE PROBLEMS MADE IT FOR YOU TO DO YOUR WORK, TAKE CARE OF THINGS AT HOME, OR GET ALONG WITH OTHER PEOPLE: NOT DIFFICULT AT ALL
1. LITTLE INTEREST OR PLEASURE IN DOING THINGS: NOT AT ALL
6. FEELING BAD ABOUT YOURSELF - OR THAT YOU ARE A FAILURE OR HAVE LET YOURSELF OR YOUR FAMILY DOWN: NOT AT ALL
5. POOR APPETITE OR OVEREATING: NOT AT ALL
9. THOUGHTS THAT YOU WOULD BE BETTER OFF DEAD, OR OF HURTING YOURSELF: NOT AT ALL
7. TROUBLE CONCENTRATING ON THINGS, SUCH AS READING THE NEWSPAPER OR WATCHING TELEVISION: NOT AT ALL
3. TROUBLE FALLING OR STAYING ASLEEP: NOT AT ALL
4. FEELING TIRED OR HAVING LITTLE ENERGY: NOT AT ALL
SUM OF ALL RESPONSES TO PHQ9 QUESTIONS 1 & 2: 0
2. FEELING DOWN, DEPRESSED OR HOPELESS: NOT AT ALL
SUM OF ALL RESPONSES TO PHQ QUESTIONS 1-9: 0
10. IF YOU CHECKED OFF ANY PROBLEMS, HOW DIFFICULT HAVE THESE PROBLEMS MADE IT FOR YOU TO DO YOUR WORK, TAKE CARE OF THINGS AT HOME, OR GET ALONG WITH OTHER PEOPLE: NOT DIFFICULT AT ALL
8. MOVING OR SPEAKING SO SLOWLY THAT OTHER PEOPLE COULD HAVE NOTICED. OR THE OPPOSITE, BEING SO FIGETY OR RESTLESS THAT YOU HAVE BEEN MOVING AROUND A LOT MORE THAN USUAL: NOT AT ALL
9. THOUGHTS THAT YOU WOULD BE BETTER OFF DEAD, OR OF HURTING YOURSELF: NOT AT ALL
SUM OF ALL RESPONSES TO PHQ QUESTIONS 1-9: 0
2. FEELING DOWN, DEPRESSED OR HOPELESS: NOT AT ALL
3. TROUBLE FALLING OR STAYING ASLEEP: NOT AT ALL
SUM OF ALL RESPONSES TO PHQ QUESTIONS 1-9: 0
4. FEELING TIRED OR HAVING LITTLE ENERGY: NOT AT ALL
SUM OF ALL RESPONSES TO PHQ QUESTIONS 1-9: 0
8. MOVING OR SPEAKING SO SLOWLY THAT OTHER PEOPLE COULD HAVE NOTICED. OR THE OPPOSITE - BEING SO FIDGETY OR RESTLESS THAT YOU HAVE BEEN MOVING AROUND A LOT MORE THAN USUAL: NOT AT ALL
SUM OF ALL RESPONSES TO PHQ QUESTIONS 1-9: 0
6. FEELING BAD ABOUT YOURSELF - OR THAT YOU ARE A FAILURE OR HAVE LET YOURSELF OR YOUR FAMILY DOWN: NOT AT ALL
7. TROUBLE CONCENTRATING ON THINGS, SUCH AS READING THE NEWSPAPER OR WATCHING TELEVISION: NOT AT ALL
5. POOR APPETITE OR OVEREATING: NOT AT ALL
1. LITTLE INTEREST OR PLEASURE IN DOING THINGS: NOT AT ALL

## 2025-01-22 ENCOUNTER — HOSPITAL ENCOUNTER (OUTPATIENT)
Dept: CT IMAGING | Age: 50
Discharge: HOME | End: 2025-01-24
Payer: COMMERCIAL

## 2025-01-22 ENCOUNTER — TELEPHONE (OUTPATIENT)
Dept: GASTROENTEROLOGY | Age: 50
End: 2025-01-22

## 2025-01-22 DIAGNOSIS — R14.0 BLOATING: ICD-10-CM

## 2025-01-22 DIAGNOSIS — R19.7 DIARRHEA, UNSPECIFIED TYPE: ICD-10-CM

## 2025-01-22 PROCEDURE — 74177 CT ABD & PELVIS W/CONTRAST: CPT

## 2025-01-22 PROCEDURE — 2580000003 HC RX 258: Performed by: PHYSICIAN ASSISTANT

## 2025-01-22 PROCEDURE — 6360000004 HC RX CONTRAST MEDICATION: Performed by: PHYSICIAN ASSISTANT

## 2025-01-22 PROCEDURE — 2500000003 HC RX 250 WO HCPCS: Performed by: PHYSICIAN ASSISTANT

## 2025-01-22 RX ORDER — IOPAMIDOL 755 MG/ML
75 INJECTION, SOLUTION INTRAVASCULAR
Status: COMPLETED | OUTPATIENT
Start: 2025-01-22 | End: 2025-01-22

## 2025-01-22 RX ORDER — 0.9 % SODIUM CHLORIDE 0.9 %
100 INTRAVENOUS SOLUTION INTRAVENOUS ONCE
Status: COMPLETED | OUTPATIENT
Start: 2025-01-22 | End: 2025-01-22

## 2025-01-22 RX ORDER — SODIUM CHLORIDE 0.9 % (FLUSH) 0.9 %
10 SYRINGE (ML) INJECTION PRN
Status: DISCONTINUED | OUTPATIENT
Start: 2025-01-22 | End: 2025-01-25 | Stop reason: HOSPADM

## 2025-01-22 RX ADMIN — SODIUM CHLORIDE 100 ML: 9 INJECTION, SOLUTION INTRAVENOUS at 07:45

## 2025-01-22 RX ADMIN — SODIUM CHLORIDE, PRESERVATIVE FREE 10 ML: 5 INJECTION INTRAVENOUS at 07:45

## 2025-01-22 RX ADMIN — IOPAMIDOL 75 ML: 755 INJECTION, SOLUTION INTRAVENOUS at 07:47

## 2025-01-22 NOTE — TELEPHONE ENCOUNTER
----- Message from Kate Tran PA-C sent at 1/22/2025 10:24 AM EST -----  Diverticulosis without diverticulitis. Needs to eat high-fiber diet to prevent future diverticulitis flares. Gallstones also present, she should notify us if she starts having postprandial pain especially in the RUQ

## 2025-01-24 ENCOUNTER — OFFICE VISIT (OUTPATIENT)
Dept: FAMILY MEDICINE CLINIC | Age: 50
End: 2025-01-24
Payer: COMMERCIAL

## 2025-01-24 VITALS
HEART RATE: 73 BPM | BODY MASS INDEX: 38.68 KG/M2 | SYSTOLIC BLOOD PRESSURE: 120 MMHG | WEIGHT: 197 LBS | DIASTOLIC BLOOD PRESSURE: 84 MMHG | HEIGHT: 60 IN | OXYGEN SATURATION: 98 %

## 2025-01-24 DIAGNOSIS — Z51.81 MEDICATION MONITORING ENCOUNTER: ICD-10-CM

## 2025-01-24 DIAGNOSIS — E66.01 SEVERE OBESITY (BMI 35.0-39.9) WITH COMORBIDITY: ICD-10-CM

## 2025-01-24 DIAGNOSIS — Z12.31 ENCOUNTER FOR SCREENING MAMMOGRAM FOR BREAST CANCER: ICD-10-CM

## 2025-01-24 DIAGNOSIS — Z76.89 ENCOUNTER FOR WEIGHT MANAGEMENT: ICD-10-CM

## 2025-01-24 DIAGNOSIS — E78.2 MIXED HYPERLIPIDEMIA: Primary | ICD-10-CM

## 2025-01-24 DIAGNOSIS — R73.03 PREDIABETES: ICD-10-CM

## 2025-01-24 LAB
ALCOHOL URINE: NEGATIVE
AMPHETAMINE SCREEN URINE: NORMAL
BARBITURATE SCREEN URINE: NEGATIVE
BENZODIAZEPINE SCREEN, URINE: NEGATIVE
BUPRENORPHINE URINE: NEGATIVE
COCAINE METABOLITE SCREEN URINE: NEGATIVE
FENTANYL SCREEN, URINE: NEGATIVE
GABAPENTIN SCREEN, URINE: NEGATIVE
MDMA, URINE: NEGATIVE
METHADONE SCREEN, URINE: NEGATIVE
METHAMPHETAMINE, URINE: NEGATIVE
OPIATE SCREEN URINE: NEGATIVE
OXYCODONE SCREEN URINE: NEGATIVE
PHENCYCLIDINE SCREEN URINE: NEGATIVE
PROPOXYPHENE SCREEN, URINE: NEGATIVE
SYNTHETIC CANNABINOIDS(K2) SCREEN, URINE: NEGATIVE
THC SCREEN, URINE: NEGATIVE
TRAMADOL SCREEN URINE: NEGATIVE
TRICYCLIC ANTIDEPRESSANTS, UR: NEGATIVE

## 2025-01-24 PROCEDURE — 1036F TOBACCO NON-USER: CPT | Performed by: FAMILY MEDICINE

## 2025-01-24 PROCEDURE — G8417 CALC BMI ABV UP PARAM F/U: HCPCS | Performed by: FAMILY MEDICINE

## 2025-01-24 PROCEDURE — 3017F COLORECTAL CA SCREEN DOC REV: CPT | Performed by: FAMILY MEDICINE

## 2025-01-24 PROCEDURE — G8427 DOCREV CUR MEDS BY ELIG CLIN: HCPCS | Performed by: FAMILY MEDICINE

## 2025-01-24 PROCEDURE — 99214 OFFICE O/P EST MOD 30 MIN: CPT | Performed by: FAMILY MEDICINE

## 2025-01-24 PROCEDURE — 80305 DRUG TEST PRSMV DIR OPT OBS: CPT | Performed by: FAMILY MEDICINE

## 2025-01-24 RX ORDER — PSYLLIUM HUSK 0.4 G
1000 CAPSULE ORAL DAILY
COMMUNITY
Start: 2024-12-10

## 2025-01-24 RX ORDER — SEMAGLUTIDE 0.25 MG/.5ML
0.25 INJECTION, SOLUTION SUBCUTANEOUS
COMMUNITY
Start: 2024-12-08 | End: 2025-01-24

## 2025-01-24 RX ORDER — PHENTERMINE HYDROCHLORIDE 37.5 MG/1
37.5 TABLET ORAL
Qty: 30 TABLET | Refills: 0 | Status: SHIPPED | OUTPATIENT
Start: 2025-01-24 | End: 2025-02-23

## 2025-01-24 SDOH — ECONOMIC STABILITY: FOOD INSECURITY: WITHIN THE PAST 12 MONTHS, THE FOOD YOU BOUGHT JUST DIDN'T LAST AND YOU DIDN'T HAVE MONEY TO GET MORE.: NEVER TRUE

## 2025-01-24 SDOH — ECONOMIC STABILITY: FOOD INSECURITY: WITHIN THE PAST 12 MONTHS, YOU WORRIED THAT YOUR FOOD WOULD RUN OUT BEFORE YOU GOT MONEY TO BUY MORE.: NEVER TRUE

## 2025-01-24 ASSESSMENT — ENCOUNTER SYMPTOMS
RECTAL PAIN: 0
VOMITING: 0
BLOOD IN STOOL: 0
RHINORRHEA: 0
STRIDOR: 0
NAUSEA: 0
ABDOMINAL PAIN: 1
EYE REDNESS: 0
DIARRHEA: 1
TROUBLE SWALLOWING: 0
BACK PAIN: 0
ABDOMINAL DISTENTION: 1
CHEST TIGHTNESS: 0
SHORTNESS OF BREATH: 0
WHEEZING: 0
COLOR CHANGE: 0
SORE THROAT: 0
SINUS PRESSURE: 0
COUGH: 0
CONSTIPATION: 0

## 2025-01-24 NOTE — PROGRESS NOTES
Visit Information    Have you changed or started any medications since your last visit including any over-the-counter medicines, vitamins, or herbal medicines? no   Are you having any side effects from any of your medications? -  no  Have you stopped taking any of your medications? Is so, why? -  no    Have you seen any other physician or provider since your last visit? No  Have you had any other diagnostic tests since your last visit? No  Have you been seen in the emergency room and/or had an admission to a hospital since we last saw you? No  Have you had your routine dental cleaning in the past 6 months? no    Have you activated your [employer] account? If not, what are your barriers? Yes     Patient Care Team:  Lena Barton MD as PCP - General (Family Medicine)  Lena Barton MD as PCP - Empaneled Provider  Lottie Shoemaker APRN - CNP as Nurse Practitioner (Certified Nurse Practitioner)  Navid Paula APRN - CNP (Cardiology)  David Martin DO as Consulting Physician (Obstetrics & Gynecology)  Alisia Vanessa MD as Consulting Physician (Neurology)  Hilary Duarte MD as Consulting Physician (Cardiology)    Medical History Review  Past Medical, Family, and Social History reviewed and does contribute to the patient presenting condition    Health Maintenance   Topic Date Due    Shingles vaccine (1 of 2) Never done    Colorectal Cancer Screen  06/28/2024    COVID-19 Vaccine (6 - 2023-24 season) 09/01/2024    Breast cancer screen  02/16/2025    Lipids  08/02/2025    A1C test (Diabetic or Prediabetic)  11/15/2025    Depression Monitoring  01/21/2026    DTaP/Tdap/Td vaccine (2 - Td or Tdap) 02/14/2027    Cervical cancer screen  09/25/2027    Flu vaccine  Completed    Pneumococcal 0-64 years Vaccine  Completed    Hepatitis C screen  Completed    HIV screen  Completed    Hepatitis A vaccine  Aged Out    Hib vaccine  Aged Out    Polio vaccine  Aged Out    Meningococcal (ACWY) vaccine  Aged Out    
hard at all   Food Insecurity: No Food Insecurity (1/24/2025)    Hunger Vital Sign     Worried About Running Out of Food in the Last Year: Never true     Ran Out of Food in the Last Year: Never true   Transportation Needs: No Transportation Needs (1/24/2025)    PRAPARE - Transportation     Lack of Transportation (Medical): No     Lack of Transportation (Non-Medical): No   Physical Activity: Not on file   Stress: Not on file   Social Connections: Not on file   Intimate Partner Violence: Not on file   Housing Stability: Low Risk  (1/24/2025)    Housing Stability Vital Sign     Unable to Pay for Housing in the Last Year: No     Number of Times Moved in the Last Year: 0     Homeless in the Last Year: No     Counseling given: Not Answered  Tobacco comments: Never smoked        Family History   Problem Relation Age of Onset    Diabetes Father         possible type 1    Heart Attack Father 33    Asthma Father     Bipolar Disorder Sister     Lung Cancer Paternal Grandmother     Heart Attack Paternal Grandfather     Colon Cancer Neg Hx     Breast Cancer Neg Hx              -rest of complaints with corresponding details per ROS    The patient's past medical, surgical, social, and family history as well as her current medications and allergies were reviewed as documented intoday's encounter.        Review of Systems   Constitutional:  Positive for unexpected weight change. Negative for activity change, appetite change, fatigue and fever.   HENT:  Negative for congestion, ear pain, postnasal drip, rhinorrhea, sinus pressure, sore throat and trouble swallowing.    Eyes:  Negative for redness and visual disturbance.   Respiratory:  Negative for cough, chest tightness, shortness of breath, wheezing and stridor.    Cardiovascular:  Negative for chest pain, palpitations and leg swelling.   Gastrointestinal:  Positive for abdominal distention, abdominal pain and diarrhea. Negative for blood in stool, constipation, nausea, rectal pain

## 2025-01-28 RX ORDER — SODIUM CHLORIDE 0.9 % (FLUSH) 0.9 %
5-40 SYRINGE (ML) INJECTION EVERY 12 HOURS SCHEDULED
Status: CANCELLED | OUTPATIENT
Start: 2025-01-28

## 2025-01-28 RX ORDER — SODIUM CHLORIDE, SODIUM LACTATE, POTASSIUM CHLORIDE, CALCIUM CHLORIDE 600; 310; 30; 20 MG/100ML; MG/100ML; MG/100ML; MG/100ML
INJECTION, SOLUTION INTRAVENOUS CONTINUOUS
Status: CANCELLED | OUTPATIENT
Start: 2025-01-28

## 2025-01-28 RX ORDER — SODIUM CHLORIDE 0.9 % (FLUSH) 0.9 %
5-40 SYRINGE (ML) INJECTION PRN
Status: CANCELLED | OUTPATIENT
Start: 2025-01-28

## 2025-01-28 RX ORDER — ACETAMINOPHEN 325 MG/1
1000 TABLET ORAL ONCE
Status: CANCELLED | OUTPATIENT
Start: 2025-01-28 | End: 2025-01-28

## 2025-01-28 RX ORDER — SODIUM CHLORIDE 9 MG/ML
INJECTION, SOLUTION INTRAVENOUS PRN
Status: CANCELLED | OUTPATIENT
Start: 2025-01-28

## 2025-01-30 PROBLEM — K80.20 CHOLELITHIASIS WITHOUT OBSTRUCTION: Status: ACTIVE | Noted: 2024-12-31

## 2025-01-30 PROBLEM — S61.219A LACERATION OF FINGER OF RIGHT HAND: Status: ACTIVE | Noted: 2017-02-14

## 2025-01-31 ENCOUNTER — HOSPITAL ENCOUNTER (OUTPATIENT)
Dept: PREADMISSION TESTING | Age: 50
Discharge: HOME | End: 2025-01-31
Payer: COMMERCIAL

## 2025-01-31 ENCOUNTER — HOSPITAL ENCOUNTER (OUTPATIENT)
Dept: GENERAL RADIOLOGY | Age: 50
End: 2025-01-31
Attending: OBSTETRICS & GYNECOLOGY
Payer: COMMERCIAL

## 2025-01-31 ENCOUNTER — OFFICE VISIT (OUTPATIENT)
Dept: OBGYN CLINIC | Age: 50
End: 2025-01-31
Payer: COMMERCIAL

## 2025-01-31 VITALS
WEIGHT: 197 LBS | DIASTOLIC BLOOD PRESSURE: 91 MMHG | HEART RATE: 84 BPM | HEIGHT: 60 IN | SYSTOLIC BLOOD PRESSURE: 129 MMHG | TEMPERATURE: 98.2 F | RESPIRATION RATE: 16 BRPM | BODY MASS INDEX: 38.68 KG/M2 | OXYGEN SATURATION: 96 %

## 2025-01-31 VITALS
SYSTOLIC BLOOD PRESSURE: 120 MMHG | HEIGHT: 60 IN | HEART RATE: 78 BPM | WEIGHT: 197 LBS | DIASTOLIC BLOOD PRESSURE: 82 MMHG | BODY MASS INDEX: 38.68 KG/M2

## 2025-01-31 DIAGNOSIS — Z01.818 PREOP TESTING: ICD-10-CM

## 2025-01-31 DIAGNOSIS — R93.89 ENDOMETRIAL THICKENING ON ULTRASOUND: ICD-10-CM

## 2025-01-31 DIAGNOSIS — Z98.51 HISTORY OF TUBAL LIGATION: ICD-10-CM

## 2025-01-31 DIAGNOSIS — D25.9 UTERINE LEIOMYOMA, UNSPECIFIED LOCATION: ICD-10-CM

## 2025-01-31 DIAGNOSIS — Z98.890 HISTORY OF ENDOMETRIAL ABLATION: ICD-10-CM

## 2025-01-31 DIAGNOSIS — N99.85 POST ENDOMETRIAL ABLATION SYNDROME: Primary | ICD-10-CM

## 2025-01-31 LAB
ABO + RH BLD: NORMAL
ANION GAP SERPL CALCULATED.3IONS-SCNC: 11 MMOL/L (ref 9–16)
ARM BAND NUMBER: NORMAL
B-HCG SERPL EIA 3RD IS-ACNC: <0.2 MIU/ML (ref 0–7)
BACTERIA URNS QL MICRO: ABNORMAL
BASOPHILS # BLD: 0 K/UL (ref 0–0.2)
BASOPHILS NFR BLD: 0 % (ref 0–2)
BILIRUB UR QL STRIP: NEGATIVE
BLOOD BANK SAMPLE EXPIRATION: NORMAL
BLOOD GROUP ANTIBODIES SERPL: NEGATIVE
BUN SERPL-MCNC: 10 MG/DL (ref 6–20)
CALCIUM SERPL-MCNC: 9.7 MG/DL (ref 8.6–10.4)
CASTS #/AREA URNS LPF: ABNORMAL /LPF
CHLORIDE SERPL-SCNC: 100 MMOL/L (ref 98–107)
CLARITY UR: ABNORMAL
CO2 SERPL-SCNC: 27 MMOL/L (ref 20–31)
COLOR UR: YELLOW
CREAT SERPL-MCNC: 0.7 MG/DL (ref 0.7–1.2)
EKG ATRIAL RATE: 73 BPM
EKG P AXIS: 18 DEGREES
EKG P-R INTERVAL: 146 MS
EKG Q-T INTERVAL: 388 MS
EKG QRS DURATION: 78 MS
EKG QTC CALCULATION (BAZETT): 427 MS
EKG R AXIS: 9 DEGREES
EKG T AXIS: 17 DEGREES
EKG VENTRICULAR RATE: 73 BPM
EOSINOPHIL # BLD: 0.1 K/UL (ref 0–0.4)
EOSINOPHILS RELATIVE PERCENT: 2 % (ref 0–4)
EPI CELLS #/AREA URNS HPF: ABNORMAL /HPF
ERYTHROCYTE [DISTWIDTH] IN BLOOD BY AUTOMATED COUNT: 13.6 % (ref 11.5–14.9)
GFR, ESTIMATED: >90 ML/MIN/1.73M2
GLUCOSE SERPL-MCNC: 108 MG/DL (ref 74–99)
GLUCOSE UR STRIP-MCNC: NEGATIVE MG/DL
HCT VFR BLD AUTO: 38.7 % (ref 36–46)
HGB BLD-MCNC: 12.9 G/DL (ref 12–16)
HGB UR QL STRIP.AUTO: NEGATIVE
KETONES UR STRIP-MCNC: NEGATIVE MG/DL
LEUKOCYTE ESTERASE UR QL STRIP: NEGATIVE
LYMPHOCYTES NFR BLD: 1.9 K/UL (ref 1–4.8)
LYMPHOCYTES RELATIVE PERCENT: 31 % (ref 24–44)
MCH RBC QN AUTO: 30.4 PG (ref 26–34)
MCHC RBC AUTO-ENTMCNC: 33.2 G/DL (ref 31–37)
MCV RBC AUTO: 91.5 FL (ref 80–100)
MONOCYTES NFR BLD: 0.5 K/UL (ref 0.1–1.3)
MONOCYTES NFR BLD: 8 % (ref 1–7)
NEUTROPHILS NFR BLD: 59 % (ref 36–66)
NEUTS SEG NFR BLD: 3.5 K/UL (ref 1.3–9.1)
NITRITE UR QL STRIP: NEGATIVE
PH UR STRIP: 6 [PH] (ref 5–8)
PLATELET # BLD AUTO: 279 K/UL (ref 150–450)
PMV BLD AUTO: 8.5 FL (ref 6–12)
POTASSIUM SERPL-SCNC: 3.8 MMOL/L (ref 3.7–5.3)
PROT UR STRIP-MCNC: NEGATIVE MG/DL
RBC # BLD AUTO: 4.23 M/UL (ref 4–5.2)
RBC #/AREA URNS HPF: ABNORMAL /HPF
SODIUM SERPL-SCNC: 138 MMOL/L (ref 136–145)
SP GR UR STRIP: 1.02 (ref 1–1.03)
UROBILINOGEN UR STRIP-ACNC: NORMAL EU/DL (ref 0–1)
WBC #/AREA URNS HPF: ABNORMAL /HPF
WBC OTHER # BLD: 6 K/UL (ref 3.5–11)

## 2025-01-31 PROCEDURE — 71046 X-RAY EXAM CHEST 2 VIEWS: CPT

## 2025-01-31 PROCEDURE — 86850 RBC ANTIBODY SCREEN: CPT

## 2025-01-31 PROCEDURE — 3017F COLORECTAL CA SCREEN DOC REV: CPT | Performed by: OBSTETRICS & GYNECOLOGY

## 2025-01-31 PROCEDURE — 1036F TOBACCO NON-USER: CPT | Performed by: OBSTETRICS & GYNECOLOGY

## 2025-01-31 PROCEDURE — 87086 URINE CULTURE/COLONY COUNT: CPT

## 2025-01-31 PROCEDURE — 80048 BASIC METABOLIC PNL TOTAL CA: CPT

## 2025-01-31 PROCEDURE — G8427 DOCREV CUR MEDS BY ELIG CLIN: HCPCS | Performed by: OBSTETRICS & GYNECOLOGY

## 2025-01-31 PROCEDURE — 81001 URINALYSIS AUTO W/SCOPE: CPT

## 2025-01-31 PROCEDURE — 85025 COMPLETE CBC W/AUTO DIFF WBC: CPT

## 2025-01-31 PROCEDURE — 84702 CHORIONIC GONADOTROPIN TEST: CPT

## 2025-01-31 PROCEDURE — G8417 CALC BMI ABV UP PARAM F/U: HCPCS | Performed by: OBSTETRICS & GYNECOLOGY

## 2025-01-31 PROCEDURE — 36415 COLL VENOUS BLD VENIPUNCTURE: CPT

## 2025-01-31 PROCEDURE — 86900 BLOOD TYPING SEROLOGIC ABO: CPT

## 2025-01-31 PROCEDURE — 86901 BLOOD TYPING SEROLOGIC RH(D): CPT

## 2025-01-31 PROCEDURE — 99213 OFFICE O/P EST LOW 20 MIN: CPT | Performed by: OBSTETRICS & GYNECOLOGY

## 2025-01-31 PROCEDURE — 93005 ELECTROCARDIOGRAM TRACING: CPT

## 2025-01-31 NOTE — H&P
[facility] Obstetrics & Gynecology  [facility]; [address]  [place] [address]  [phone] mn [phone] Fax        Celia Stephens  1975  Primary Care Physician: Lena Barton MD        HISTORY AND PHYSICAL      Hospital: [facility]      2025  MEDICAID CONSENT COMPLETED: No      HPI: Celia Stephens is a 49 y.o. female   she is being seen for the problems listed below and is planning surgical intervention. She was counseled on all conservative medical and surgical options as well as definitive procedures as well.    1. Post endometrial ablation syndrome    2. Uterine leiomyoma, unspecified location    3. Endometrial thickening on ultrasound    4. History of tubal ligation    5. History of endometrial ablation      HPI (1/10/2025):  She is following up on imaging fr [facility] for right pelvic pain. Imaging c/w PATSS. I reviewed this in detail and options for cx dilation and aspiration with embx in office vs OR. Pt elected to proceed with D&C hscope +/-Myosure with evacuation of suspected polyp and hematosalpinx. Probable PATSS.     Relevant Past History:   Patient Active Problem List    Diagnosis Date Noted    History of endometrial ablation 2017     Priority: High           History of tubal ligation      Priority: High    Genital warts 2022     Priority: Medium    Vitamin D deficiency 2022     Priority: Medium    Migraine without aura and without status migrainosus, not intractable 2022     Priority: Medium    Allergic rhinitis due to allergen 2022     Priority: Medium    Severe obesity (BMI 35.0-39.9) with comorbidity 2022     Priority: Medium    Mild intermittent asthma without complication 2022     Priority: Medium    Thickened endometrium 2025    Post endometrial ablation syndrome 2025    Uterine leiomyoma 2025    Cholelithiasis without obstruction 2024    Colon cancer screening 2024    Elevated

## 2025-01-31 NOTE — PROGRESS NOTES
Dr. Mendez, anesthesia, was contacted and informed of the patient's planned surgery, history, and unconfirmed abnormal EKG results from EKG done today in [facility].  No clearance required.

## 2025-01-31 NOTE — DISCHARGE INSTRUCTIONS
Pre-op Instructions For Out-Patient Surgery    Medication Instructions:  Please stop herbs and any supplements now (includes vitamins and minerals).    For these medications:  Dulaglutide (Trulicity), Exenatide (Byetta and Bydureon, Liraglutide (Victoza), Lixisenatide (Adlyxin), Semaglutide (Ozempic and Rybelsus), Tirzepatide (Mounjaro, Zepbound)- Stop 1 week prior if taking weekly or 1 day prior if taking every 12 hours or daily.     Please contact your surgeon and prescribing physician for pre-op instructions for any blood thinners.    If you have inhalers/aerosol treatments at home, please use them the morning of your surgery and bring the inhalers with you to the hospital.    Please take the following medications the morning of your surgery with a sip of water:    None    Surgery Instructions:  After midnight before surgery:  Do not eat or drink anything, including water, mints, gum, and hard candy.  You may brush your teeth without swallowing.  No smoking, chewing tobacco, or street drugs.    Please shower or bathe before surgery.  If you were given Surgical Scrub Chlorhexidine Gluconate Liquid (CHG), please shower the night before and the morning of your surgery following the detailed instructions you received during your pre-admission visit.     Please do not wear any cologne, lotion, powder, deodorant, jewelry, piercings, perfume, makeup, nail polish, hair accessories, or hair spray on the day of surgery.  Wear loose comfortable clothing.    Leave your valuables at home but bring a payment source for any after-surgery prescriptions you plan to fill at [facility].  Bring a storage case for any glasses/contacts.    An adult who is responsible for you MUST drive you home and should be with you for the first 24 hours after surgery.     If having out-patient knee and foot surgeries, please arrange for planned crutches, walker, or wheelchair before arriving to the

## 2025-01-31 NOTE — PROGRESS NOTES
[facility]stetric[facility] Gyne[facility]  [facility]; [address]  [place] [address]  [phone] mn [phone] Fax        Celia Stephens  1975  Primary Care Physician: Lena Barton MD        HISTORY AND PHYSICAL      Hospital: [facility]      2025  MEDICAID CONSENT COMPLETED: No      HPI: Celia Stephens is a 49 y.o. female   she is being seen for the problems listed below and is planning surgical intervention. She was counseled on all conservative medical and surgical options as well as definitive procedures as well.    1. Post endometrial ablation syndrome    2. Uterine leiomyoma, unspecified location    3. Endometrial thickening on ultrasound    4. History of tubal ligation    5. History of endometrial ablation      HPI (1/10/2025):  She is following up on imaging fr [facility] for right pelvic pain. Imaging c/w PATSS. I reviewed this in detail and options for cx dilation and aspiration with embx in office vs OR. Pt elected to proceed with D&C hscope +/-Myosure with evacuation of suspected polyp and hematosalpinx. Probable PATSS.     Relevant Past History:   Patient Active Problem List    Diagnosis Date Noted    History of endometrial ablation 2017     Priority: High           History of tubal ligation      Priority: High    Genital warts 2022     Priority: Medium    Vitamin D deficiency 2022     Priority: Medium    Migraine without aura and without status migrainosus, not intractable 2022     Priority: Medium    Allergic rhinitis due to allergen 2022     Priority: Medium    Severe obesity (BMI 35.0-39.9) with comorbidity 2022     Priority: Medium    Mild intermittent asthma without complication 2022     Priority: Medium    Thickened endometrium 2025    Post endometrial ablation syndrome 2025    Uterine leiomyoma 2025    Cholelithiasis without obstruction 2024    Colon cancer screening 2024    Elevated

## 2025-02-01 LAB
MICROORGANISM SPEC CULT: NORMAL
SERVICE CMNT-IMP: NORMAL
SPECIMEN DESCRIPTION: NORMAL

## 2025-02-03 ENCOUNTER — TELEPHONE (OUTPATIENT)
Dept: OBGYN CLINIC | Age: 50
End: 2025-02-03

## 2025-02-03 LAB
EKG ATRIAL RATE: 73 BPM
EKG P AXIS: 18 DEGREES
EKG P-R INTERVAL: 146 MS
EKG Q-T INTERVAL: 388 MS
EKG QRS DURATION: 78 MS
EKG QTC CALCULATION (BAZETT): 427 MS
EKG R AXIS: 9 DEGREES
EKG T AXIS: 17 DEGREES
EKG VENTRICULAR RATE: 73 BPM

## 2025-02-03 PROCEDURE — 93010 ELECTROCARDIOGRAM REPORT: CPT | Performed by: INTERNAL MEDICINE

## 2025-02-03 NOTE — TELEPHONE ENCOUNTER
[facility] Result Note Patient Contact Communication   [address] [place] [address]      02/03/25   12:14 PM     Patients Name: Celia Stephens   Medical Record Number: [id number]   Contact Serial Number: [id number]  YOB: 1975       Patients Phone Number: [phone]     Description of Recommendations:  The patient was contacted and her identity was confirmed with two of the specific identifiers listed above.  The information regarding her recent testing results and provider recommendations were reviewed with her in detail and all questions were answered.   Recent labs/Cultures/ Imaging Studies/Pathology reports and Urinalysis results are included:      Recommendations given by provider:  David Martin DO P p [facility] Clinical Support Pool  ABNORMAL EKG  IF SX PATIENT HOLD SX-Notify GREGORIA or April  NEEDS Clearance by PCP/Cardiology    Faxed clearance request to PCP    The patient verbalized understanding of the information above and the recommendation by the provider. She will contact her PCP if any other questions arise or contact our office for any other clarifications.        Electronically signed by Janet Mathis on 2/3/2025 at 12:14 PM

## 2025-02-03 NOTE — TELEPHONE ENCOUNTER
----- Message from Dr. David Martin, DO sent at 2/3/2025 11:25 AM EST -----  ABNORMAL EKG  IF SX PATIENT HOLD SX-Notify GREGORIA or April  NEEDS Clearance by PCP/Cardiology

## 2025-02-04 ENCOUNTER — TELEPHONE (OUTPATIENT)
Dept: FAMILY MEDICINE CLINIC | Age: 50
End: 2025-02-04

## 2025-02-04 DIAGNOSIS — Z01.810 PREOPERATIVE CARDIOVASCULAR EXAMINATION: Primary | ICD-10-CM

## 2025-02-04 DIAGNOSIS — R94.31 ABNORMAL EKG: ICD-10-CM

## 2025-02-04 NOTE — TELEPHONE ENCOUNTER
Patient has high blood pressure, or at least blood pressure reading being high, she needs a nurse visit for blood pressure check for clearance    EKG 1/28/2025 showed nonspecific T wave abnormalities, prior EKG 6/3/2021 was normal    In the past patient has seen cardiology, due to new changes, she would benefit from seeing another cardiologist prior to the clearance    Can I refer her to cardiologist?  Does she have any preference?  Let me know to put the referral in    She will have to stop phentermine 1 week prior to surgical intervention  Lab Results   Component Value Date/Time     01/31/2025 01:30 PM    K 3.8 01/31/2025 01:30 PM     01/31/2025 01:30 PM    CO2 27 01/31/2025 01:30 PM    BUN 10 01/31/2025 01:30 PM    CREATININE 0.7 01/31/2025 01:30 PM    GLUCOSE 108 01/31/2025 01:30 PM    GLUCOSE 90 12/16/2011 03:47 PM    CALCIUM 9.7 01/31/2025 01:30 PM    LABGLOM >90 01/31/2025 01:30 PM    LABGLOM >60 10/13/2023 08:11 AM        BP Readings from Last 3 Encounters:   01/31/25 (!) 129/91   01/31/25 120/82   01/24/25 120/84

## 2025-02-04 NOTE — TELEPHONE ENCOUNTER
Medical Clearance received via fax from: [facility]    Upcoming Surgery/Procedure: D+C Hysteroscopy     Scheduled on 02/17/2025 .    Pt requires need medical clearance prior to scheduled surgery/procedure .       Next Visit Date:  2/24/2025   Last Visit Date: 1/24/2025    Please see attachment below.     Thank you!

## 2025-02-05 NOTE — PROGRESS NOTES
Future Appointments   Date Time Provider Department Center   2/7/2025  9:45 AM Kate Tran PA-C [facility]   2/24/2025  4:30 PM Bladimir Encarnacion APRN - CNP [facility]   3/14/2025  9:15 AM David Martin, [facility]   3/24/2025  3:30 PM Bladimir Encarnacion APRN - CNP [facility]   9/29/2025 10:30 AM Lottie Shoemaker APRN - CNP [facility]          Diagnosis Orders   1. Preoperative cardiovascular examination  AFL (Epic) - Sheron Fletcher MD, Cardiology, [place]    Echo (TTE) complete (PRN contrast/bubble/strain/3D)      2. Abnormal EKG  AFL (Epic) - Sheron Fletcher MD, Cardiology, [place]    Echo (TTE) complete (PRN contrast/bubble/strain/3D)           Future Appointments   Date Time Provider Department Center   2/7/2025  9:45 AM Kate Tran PA-C [facility]   2/24/2025  4:30 PM Bladimir Encarnacion APRN - CNP [facility]   3/14/2025  9:15 AM David Martin, [facility]   3/24/2025  3:30 PM Bladimir Encarnacion APRN - CNP [facility]   9/29/2025 10:30 AM Lottie Shoemkaer APRN - CNP [facility]

## 2025-02-12 ENCOUNTER — HOSPITAL ENCOUNTER (OUTPATIENT)
Age: 50
Discharge: HOME | End: 2025-02-14
Attending: FAMILY MEDICINE
Payer: COMMERCIAL

## 2025-02-12 DIAGNOSIS — Z01.810 PREOPERATIVE CARDIOVASCULAR EXAMINATION: ICD-10-CM

## 2025-02-12 DIAGNOSIS — R94.31 ABNORMAL EKG: ICD-10-CM

## 2025-02-12 LAB
ECHO AO ASC DIAM: 3.4 CM
ECHO AO ROOT DIAM: 3 CM
ECHO AV AREA PEAK VELOCITY: 2.3 CM2
ECHO AV AREA VTI: 2.1 CM2
ECHO AV MEAN GRADIENT: 4 MMHG
ECHO AV MEAN VELOCITY: 1 M/S
ECHO AV PEAK GRADIENT: 8 MMHG
ECHO AV PEAK VELOCITY: 1.4 M/S
ECHO AV VELOCITY RATIO: 0.71
ECHO AV VTI: 29.4 CM
ECHO IVC PROX: 1.4 CM
ECHO LA AREA 2C: 12.9 CM2
ECHO LA AREA 4C: 11.3 CM2
ECHO LA DIAMETER: 3.1 CM
ECHO LA MAJOR AXIS: 4.2 CM
ECHO LA MINOR AXIS: 4.5 CM
ECHO LA TO AORTIC ROOT RATIO: 1.03
ECHO LA VOL BP: 29 ML (ref 22–52)
ECHO LA VOL MOD A2C: 31 ML (ref 22–52)
ECHO LA VOL MOD A4C: 26 ML (ref 22–52)
ECHO LV E' LATERAL VELOCITY: 12.4 CM/S
ECHO LV E' SEPTAL VELOCITY: 7.83 CM/S
ECHO LV EDV A2C: 54 ML
ECHO LV EDV A4C: 65 ML
ECHO LV EJECTION FRACTION A2C: 65 %
ECHO LV EJECTION FRACTION A4C: 53 %
ECHO LV EJECTION FRACTION BIPLANE: 59 % (ref 55–100)
ECHO LV ESV A2C: 19 ML
ECHO LV ESV A4C: 31 ML
ECHO LV FRACTIONAL SHORTENING: 20 % (ref 28–44)
ECHO LV INTERNAL DIMENSION DIASTOLIC: 4.1 CM (ref 3.9–5.3)
ECHO LV INTERNAL DIMENSION SYSTOLIC: 3.3 CM
ECHO LV IVSD: 1 CM (ref 0.6–0.9)
ECHO LV MASS 2D: 132.1 G (ref 67–162)
ECHO LV POSTERIOR WALL DIASTOLIC: 1 CM (ref 0.6–0.9)
ECHO LV RELATIVE WALL THICKNESS RATIO: 0.49
ECHO LVOT AREA: 3.1 CM2
ECHO LVOT AV VTI INDEX: 0.67
ECHO LVOT DIAM: 2 CM
ECHO LVOT MEAN GRADIENT: 2 MMHG
ECHO LVOT PEAK GRADIENT: 4 MMHG
ECHO LVOT PEAK VELOCITY: 1 M/S
ECHO LVOT SV: 61.5 ML
ECHO LVOT VTI: 19.6 CM
ECHO MV A VELOCITY: 0.64 M/S
ECHO MV AREA VTI: 2.5 CM2
ECHO MV E DECELERATION TIME (DT): 190 MS
ECHO MV E VELOCITY: 0.98 M/S
ECHO MV E/A RATIO: 1.53
ECHO MV E/E' LATERAL: 7.9
ECHO MV E/E' RATIO (AVERAGED): 10.21
ECHO MV E/E' SEPTAL: 12.52
ECHO MV LVOT VTI INDEX: 1.27
ECHO MV MAX VELOCITY: 0.9 M/S
ECHO MV MEAN GRADIENT: 1 MMHG
ECHO MV MEAN VELOCITY: 0.6 M/S
ECHO MV PEAK GRADIENT: 3 MMHG
ECHO MV REGURGITANT PEAK GRADIENT: 14 MMHG
ECHO MV REGURGITANT PEAK VELOCITY: 1.9 M/S
ECHO MV VTI: 24.9 CM
ECHO RV FREE WALL PEAK S': 13.3 CM/S
ECHO RV TAPSE: 2.7 CM (ref 1.7–?)

## 2025-02-12 PROCEDURE — 93306 TTE W/DOPPLER COMPLETE: CPT

## 2025-02-13 ENCOUNTER — TELEPHONE (OUTPATIENT)
Dept: FAMILY MEDICINE CLINIC | Age: 50
End: 2025-02-13

## 2025-02-13 NOTE — TELEPHONE ENCOUNTER
Spoke with Curly ACEVES and let them know she is not cleared for surgery on Monday, and that she needs to see Cardiologist.

## 2025-02-13 NOTE — TELEPHONE ENCOUNTER
Giovanna ACEVES called this morning and wanted to know if the patient will be cleared for surgery on Monday, she missed her BP check yesterday does it need rescheduled and if she doesn't see her cardiologist before surgery do you not want her to have the surgery done. Please Advise Thanks  [phone]

## 2025-02-13 NOTE — RESULT ENCOUNTER NOTE
Please notify patient: Echo does show mild increased thickness of the wall, good blood pressure control is needed, her blood pressure was towards the higher side  I do suggest to start a blood pressure medication  Yes there is a little bit of fat around the heart, weight loss is needed, I know she has been working hard on it  Otherwise the echo is normal  I would suggest to start her on a blood pressure medication, metoprolol XL 25 Mg daily, let me know if she agrees and they can do that  Future Appointments  2/24/2025  4:30 PM    Bladimir Encarnacion APRN - CNP   [facility]  3/24/2025  3:30 PM    Bladimir Encarnacion APRN - CNP   [facility]  9/29/2025  10:30 AM   Lottie Shoemaker APRN - CNP  [facility]        [facility]

## 2025-02-13 NOTE — TELEPHONE ENCOUNTER
My note was, see below, everything is still valid, she is not cleared for surgery, and she will need clearance from cardiologist as well because of new changes on the EKG      BP Readings from Last 3 Encounters:   01/31/25 (!) 129/91   01/31/25 120/82   01/24/25 120/84     Future Appointments   Date Time Provider Department Center   2/24/2025  4:30 PM Bladimir Encarnacion APRN - CNP fp [facility]   3/14/2025  9:15 AM David Martin DO [facility] [place] [facility]   3/24/2025  3:30 PM Bladimir Encarnacion APRN - CNP fp [facility] DE[facility]   9/29/2025 10:30 AM Lottie Shoemaker APRN - CNP [facility]         FYI    Patient has high blood pressure, or at least blood pressure reading being high, she needs a nurse visit for blood pressure check for clearance     EKG 1/28/2025 showed nonspecific T wave abnormalities, prior EKG 6/3/2021 was normal     In the past patient has seen cardiology, due to new changes, she would benefit from seeing another cardiologist prior to the clearance     Can I refer her to cardiologist?  Does she have any preference?  Let me know to put the referral in     She will have to stop phentermine 1 week prior to surgical intervention

## 2025-02-20 PROBLEM — Z12.11 COLON CANCER SCREENING: Status: RESOLVED | Noted: 2024-11-18 | Resolved: 2025-02-20

## 2025-03-11 ENCOUNTER — TELEPHONE (OUTPATIENT)
Dept: GASTROENTEROLOGY | Age: 50
End: 2025-03-11

## 2025-03-11 NOTE — TELEPHONE ENCOUNTER
Received call from patient requesting status update of r/s colonoscopy that was cancelled for 3/14/2025. Pt stated she has left a message on the nurse line VM as well as sent a [employer] message and has not received a call back  yet.    Please advise.    Call back #: [phone]

## 2025-03-13 ENCOUNTER — PREP FOR PROCEDURE (OUTPATIENT)
Dept: GASTROENTEROLOGY | Age: 50
End: 2025-03-13

## 2025-03-24 ENCOUNTER — TELEPHONE (OUTPATIENT)
Dept: OBGYN CLINIC | Age: 50
End: 2025-03-24

## 2025-03-24 NOTE — TELEPHONE ENCOUNTER
Called patient to discuss moving forward with surgery. Was to have D&C Hysteroscopy. Canceled due to needing to see cardiology to be cleared. Patient is now in between insurances. States is trying to get that all figured out and she will call back to make an appointment to re discuss surgery with Dr Martin.

## 2025-05-13 ENCOUNTER — PREP FOR PROCEDURE (OUTPATIENT)
Dept: GASTROENTEROLOGY | Age: 50
End: 2025-05-13

## 2025-05-13 DIAGNOSIS — Z12.11 COLON CANCER SCREENING: ICD-10-CM

## 2025-06-12 PROBLEM — Z12.11 COLON CANCER SCREENING: Status: RESOLVED | Noted: 2025-05-13 | Resolved: 2025-06-12

## 2025-06-16 ENCOUNTER — OFFICE VISIT (OUTPATIENT)
Dept: FAMILY MEDICINE CLINIC | Age: 50
End: 2025-06-16
Payer: COMMERCIAL

## 2025-06-16 VITALS
BODY MASS INDEX: 38.95 KG/M2 | OXYGEN SATURATION: 99 % | TEMPERATURE: 98.7 F | SYSTOLIC BLOOD PRESSURE: 125 MMHG | HEIGHT: 60 IN | WEIGHT: 198.4 LBS | DIASTOLIC BLOOD PRESSURE: 96 MMHG | HEART RATE: 77 BPM

## 2025-06-16 DIAGNOSIS — R10.12 LUQ PAIN: ICD-10-CM

## 2025-06-16 DIAGNOSIS — E78.2 MIXED HYPERLIPIDEMIA: ICD-10-CM

## 2025-06-16 DIAGNOSIS — E66.01 SEVERE OBESITY (BMI 35.0-39.9) WITH COMORBIDITY (HCC): ICD-10-CM

## 2025-06-16 DIAGNOSIS — J45.40 MODERATE PERSISTENT ASTHMA WITHOUT COMPLICATION: ICD-10-CM

## 2025-06-16 DIAGNOSIS — J45.990 ASTHMA, EXERCISE INDUCED: ICD-10-CM

## 2025-06-16 DIAGNOSIS — R53.82 CHRONIC FATIGUE: ICD-10-CM

## 2025-06-16 DIAGNOSIS — I10 HYPERTENSION, UNSPECIFIED TYPE: Primary | ICD-10-CM

## 2025-06-16 DIAGNOSIS — Z76.89 ENCOUNTER FOR WEIGHT MANAGEMENT: ICD-10-CM

## 2025-06-16 DIAGNOSIS — I10 PRIMARY HYPERTENSION: ICD-10-CM

## 2025-06-16 PROCEDURE — 3074F SYST BP LT 130 MM HG: CPT | Performed by: NURSE PRACTITIONER

## 2025-06-16 PROCEDURE — 99214 OFFICE O/P EST MOD 30 MIN: CPT | Performed by: NURSE PRACTITIONER

## 2025-06-16 PROCEDURE — 3080F DIAST BP >= 90 MM HG: CPT | Performed by: NURSE PRACTITIONER

## 2025-06-16 RX ORDER — METOPROLOL SUCCINATE 25 MG/1
25 TABLET, EXTENDED RELEASE ORAL DAILY
Qty: 90 TABLET | Refills: 1 | Status: SHIPPED | OUTPATIENT
Start: 2025-06-16

## 2025-06-16 RX ORDER — ALBUTEROL SULFATE 90 UG/1
2 INHALANT RESPIRATORY (INHALATION) EVERY 6 HOURS PRN
Qty: 18 G | Refills: 3 | Status: SHIPPED | OUTPATIENT
Start: 2025-06-16

## 2025-06-16 RX ORDER — PRAVASTATIN SODIUM 20 MG
20 TABLET ORAL
Qty: 90 TABLET | Refills: 2 | Status: SHIPPED | OUTPATIENT
Start: 2025-06-16

## 2025-06-16 SDOH — ECONOMIC STABILITY: FOOD INSECURITY: WITHIN THE PAST 12 MONTHS, YOU WORRIED THAT YOUR FOOD WOULD RUN OUT BEFORE YOU GOT MONEY TO BUY MORE.: NEVER TRUE

## 2025-06-16 SDOH — ECONOMIC STABILITY: FOOD INSECURITY: WITHIN THE PAST 12 MONTHS, THE FOOD YOU BOUGHT JUST DIDN'T LAST AND YOU DIDN'T HAVE MONEY TO GET MORE.: NEVER TRUE

## 2025-06-16 ASSESSMENT — PATIENT HEALTH QUESTIONNAIRE - PHQ9
SUM OF ALL RESPONSES TO PHQ QUESTIONS 1-9: 0
1. LITTLE INTEREST OR PLEASURE IN DOING THINGS: NOT AT ALL
4. FEELING TIRED OR HAVING LITTLE ENERGY: NOT AT ALL
9. THOUGHTS THAT YOU WOULD BE BETTER OFF DEAD, OR OF HURTING YOURSELF: NOT AT ALL
7. TROUBLE CONCENTRATING ON THINGS, SUCH AS READING THE NEWSPAPER OR WATCHING TELEVISION: NOT AT ALL
10. IF YOU CHECKED OFF ANY PROBLEMS, HOW DIFFICULT HAVE THESE PROBLEMS MADE IT FOR YOU TO DO YOUR WORK, TAKE CARE OF THINGS AT HOME, OR GET ALONG WITH OTHER PEOPLE: NOT DIFFICULT AT ALL
2. FEELING DOWN, DEPRESSED OR HOPELESS: NOT AT ALL
6. FEELING BAD ABOUT YOURSELF - OR THAT YOU ARE A FAILURE OR HAVE LET YOURSELF OR YOUR FAMILY DOWN: NOT AT ALL
SUM OF ALL RESPONSES TO PHQ QUESTIONS 1-9: 0
SUM OF ALL RESPONSES TO PHQ QUESTIONS 1-9: 0
5. POOR APPETITE OR OVEREATING: NOT AT ALL
3. TROUBLE FALLING OR STAYING ASLEEP: NOT AT ALL
8. MOVING OR SPEAKING SO SLOWLY THAT OTHER PEOPLE COULD HAVE NOTICED. OR THE OPPOSITE, BEING SO FIGETY OR RESTLESS THAT YOU HAVE BEEN MOVING AROUND A LOT MORE THAN USUAL: NOT AT ALL
SUM OF ALL RESPONSES TO PHQ QUESTIONS 1-9: 0

## 2025-06-16 ASSESSMENT — ENCOUNTER SYMPTOMS
CHEST TIGHTNESS: 0
WHEEZING: 1
CONSTIPATION: 0
ABDOMINAL PAIN: 1
COUGH: 0
BLOOD IN STOOL: 0
SHORTNESS OF BREATH: 0
NAUSEA: 0
DIARRHEA: 0
VOMITING: 0
ABDOMINAL DISTENTION: 0
BACK PAIN: 0

## 2025-06-16 NOTE — PROGRESS NOTES
Visit Information    Have you changed or started any medications since your last visit including any over-the-counter medicines, vitamins, or herbal medicines? YES   Have you stopped taking any of your medications? Is so, why? -  YES  Are you having any side effects from any of your medications? - no    Have you seen any other physician or provider since your last visit?  yes -    Have you had any other diagnostic tests since your last visit?  yes -    Have you been seen in the emergency room and/or had an admission in a hospital since we last saw you?  no   Have you had your routine dental cleaning in the past 6 months?  no     Do you have an active MyChart account? If no, what is the barrier?  Yes    Patient Care Team:  Lena Barton MD as PCP - General (Family Medicine)  Lena Barton MD as PCP - Empaneled Provider  Lottie Shoemaker APRN - CNP as Nurse Practitioner (Certified Nurse Practitioner)  Navid Paula APRN - CNP (Cardiology)  David Martin DO as Consulting Physician (Obstetrics & Gynecology)  Alisia Vanessa MD as Consulting Physician (Neurology)  Hilary Duarte MD as Consulting Physician (Cardiology)    Medical History Review  Past Medical, Family, and Social History reviewed and does contribute to the patient presenting condition    Health Maintenance   Topic Date Due    Shingles vaccine (1 of 2) Never done    Colorectal Cancer Screen  06/28/2024    COVID-19 Vaccine (6 - 2024-25 season) 09/01/2024    Breast cancer screen  02/16/2025    Lipids  08/02/2025    A1C test (Diabetic or Prediabetic)  11/15/2025    Depression Monitoring  01/21/2026    DTaP/Tdap/Td vaccine (2 - Td or Tdap) 02/14/2027    Cervical cancer screen  09/25/2027    Flu vaccine  Completed    Pneumococcal 50+ years Vaccine  Completed    Hepatitis C screen  Completed    HIV screen  Completed    Hepatitis A vaccine  Aged Out    Hib vaccine  Aged Out    Polio vaccine  Aged Out    Meningococcal (ACWY) vaccine  Aged Out 
intention of discontinuing it in the future. She has been monitoring her blood pressure at home using a personal cuff.    She is considering resuming semaglutide treatment but is aware that her insurance does not cover it. She has previously ordered this medication online in 08/2024, which resulted in severe constipation. She prefers to obtain the medication locally and under professional supervision. She is also interested in consulting a dietitian to discuss potential lifestyle modifications.    She has been advised to consult a cardiologist due to an abnormal T wave detected on her EKG, which led to the postponement of a planned endometrial procedure. She has not yet undergone this procedure or seen a cardiologist. She reports occasional palpitations and uses an inhaler as needed, particularly in humid conditions when she experiences wheezing. She engages in high-intensity interval training (HIIT) sessions and uses her inhaler prior to these workouts at times, rarely.     Review of Systems   Constitutional:  Positive for fatigue. Negative for activity change, appetite change, chills, diaphoresis, fever and unexpected weight change.   HENT:  Negative for congestion, dental problem and hearing loss.    Eyes:  Negative for visual disturbance.   Respiratory:  Positive for wheezing (At times, primarily with exercise, not often). Negative for cough, chest tightness and shortness of breath.    Cardiovascular:  Positive for palpitations. Negative for chest pain and leg swelling.   Gastrointestinal:  Positive for abdominal pain (Left upper quadrant). Negative for abdominal distention, blood in stool, constipation, diarrhea, nausea and vomiting.   Endocrine: Negative for cold intolerance, heat intolerance, polydipsia, polyphagia and polyuria.   Genitourinary:  Negative for difficulty urinating, dysuria, frequency, urgency and vaginal pain.   Musculoskeletal:  Negative for arthralgias, back pain and myalgias.   Skin:

## 2025-06-18 ENCOUNTER — HOSPITAL ENCOUNTER (OUTPATIENT)
Age: 50
Discharge: HOME | End: 2025-06-18
Payer: COMMERCIAL

## 2025-06-18 DIAGNOSIS — R53.82 CHRONIC FATIGUE: ICD-10-CM

## 2025-06-18 DIAGNOSIS — E78.2 MIXED HYPERLIPIDEMIA: ICD-10-CM

## 2025-06-18 DIAGNOSIS — I10 PRIMARY HYPERTENSION: ICD-10-CM

## 2025-06-18 DIAGNOSIS — R10.12 LUQ PAIN: ICD-10-CM

## 2025-06-18 LAB
25(OH)D3 SERPL-MCNC: 25.8 NG/ML (ref 30–100)
ALBUMIN SERPL-MCNC: 4.4 G/DL (ref 3.5–5.2)
ALBUMIN/GLOB SERPL: 1.3 {RATIO} (ref 1–2.5)
ALP SERPL-CCNC: 69 U/L (ref 35–104)
ALT SERPL-CCNC: 16 U/L (ref 10–35)
ANION GAP SERPL CALCULATED.3IONS-SCNC: 10 MMOL/L (ref 9–16)
AST SERPL-CCNC: 15 U/L (ref 10–35)
BASOPHILS # BLD: <0.03 K/UL (ref 0–0.2)
BASOPHILS NFR BLD: 0 % (ref 0–2)
BILIRUB SERPL-MCNC: 0.3 MG/DL (ref 0–1.2)
BUN SERPL-MCNC: 10 MG/DL (ref 6–20)
CALCIUM SERPL-MCNC: 9.6 MG/DL (ref 8.6–10.4)
CHLORIDE SERPL-SCNC: 103 MMOL/L (ref 98–107)
CHOLEST SERPL-MCNC: 229 MG/DL (ref 0–199)
CHOLESTEROL/HDL RATIO: 4.8
CO2 SERPL-SCNC: 26 MMOL/L (ref 20–31)
CREAT SERPL-MCNC: 0.7 MG/DL (ref 0.6–0.9)
EOSINOPHIL # BLD: 0.17 K/UL (ref 0–0.44)
EOSINOPHILS RELATIVE PERCENT: 4 % (ref 1–4)
ERYTHROCYTE [DISTWIDTH] IN BLOOD BY AUTOMATED COUNT: 12.9 % (ref 11.8–14.4)
FERRITIN SERPL-MCNC: 142 NG/ML (ref 15–150)
FOLATE SERPL-MCNC: 25.1 NG/ML (ref 4.8–24.2)
GFR, ESTIMATED: >90 ML/MIN/1.73M2
GLUCOSE SERPL-MCNC: 110 MG/DL (ref 74–99)
H PYLORI BREATH TEST: NEGATIVE
HCT VFR BLD AUTO: 39.1 % (ref 36.3–47.1)
HDLC SERPL-MCNC: 48 MG/DL
HGB BLD-MCNC: 12.8 G/DL (ref 11.9–15.1)
IMM GRANULOCYTES # BLD AUTO: <0.03 K/UL (ref 0–0.3)
IMM GRANULOCYTES NFR BLD: 0 %
IRON SATN MFR SERPL: 19 % (ref 20–55)
IRON SERPL-MCNC: 58 UG/DL (ref 37–145)
LDLC SERPL CALC-MCNC: 129 MG/DL (ref 0–100)
LIPASE SERPL-CCNC: 28 U/L (ref 13–60)
LYMPHOCYTES NFR BLD: 1.7 K/UL (ref 1.1–3.7)
LYMPHOCYTES RELATIVE PERCENT: 35 % (ref 24–43)
MAGNESIUM SERPL-MCNC: 1.8 MG/DL (ref 1.6–2.6)
MCH RBC QN AUTO: 29.8 PG (ref 25.2–33.5)
MCHC RBC AUTO-ENTMCNC: 32.7 G/DL (ref 28.4–34.8)
MCV RBC AUTO: 91.1 FL (ref 82.6–102.9)
MONOCYTES NFR BLD: 0.35 K/UL (ref 0.1–1.2)
MONOCYTES NFR BLD: 7 % (ref 3–12)
NEUTROPHILS NFR BLD: 54 % (ref 36–65)
NEUTS SEG NFR BLD: 2.58 K/UL (ref 1.5–8.1)
NRBC BLD-RTO: 0 PER 100 WBC
PLATELET # BLD AUTO: 269 K/UL (ref 138–453)
PMV BLD AUTO: 11 FL (ref 8.1–13.5)
POTASSIUM SERPL-SCNC: 4.2 MMOL/L (ref 3.7–5.3)
PROT SERPL-MCNC: 7.7 G/DL (ref 6.6–8.7)
RBC # BLD AUTO: 4.29 M/UL (ref 3.95–5.11)
SODIUM SERPL-SCNC: 139 MMOL/L (ref 136–145)
TIBC SERPL-MCNC: 305 UG/DL (ref 250–450)
TRIGL SERPL-MCNC: 262 MG/DL
TSH SERPL DL<=0.05 MIU/L-ACNC: 1.25 UIU/ML (ref 0.27–4.2)
UNSATURATED IRON BINDING CAPACITY: 247 UG/DL (ref 112–347)
URATE SERPL-MCNC: 7.4 MG/DL (ref 2.4–5.7)
VIT B12 SERPL-MCNC: 381 PG/ML (ref 232–1245)
VLDLC SERPL CALC-MCNC: 52 MG/DL (ref 1–30)
WBC OTHER # BLD: 4.8 K/UL (ref 3.5–11.3)

## 2025-06-18 PROCEDURE — 82746 ASSAY OF FOLIC ACID SERUM: CPT

## 2025-06-18 PROCEDURE — 84443 ASSAY THYROID STIM HORMONE: CPT

## 2025-06-18 PROCEDURE — 36415 COLL VENOUS BLD VENIPUNCTURE: CPT

## 2025-06-18 PROCEDURE — 83013 H PYLORI (C-13) BREATH: CPT

## 2025-06-18 PROCEDURE — 85025 COMPLETE CBC W/AUTO DIFF WBC: CPT

## 2025-06-18 PROCEDURE — 83735 ASSAY OF MAGNESIUM: CPT

## 2025-06-18 PROCEDURE — 83690 ASSAY OF LIPASE: CPT

## 2025-06-18 PROCEDURE — 82306 VITAMIN D 25 HYDROXY: CPT

## 2025-06-18 PROCEDURE — 83540 ASSAY OF IRON: CPT

## 2025-06-18 PROCEDURE — 82728 ASSAY OF FERRITIN: CPT

## 2025-06-18 PROCEDURE — 82607 VITAMIN B-12: CPT

## 2025-06-18 PROCEDURE — 84550 ASSAY OF BLOOD/URIC ACID: CPT

## 2025-06-18 PROCEDURE — 80053 COMPREHEN METABOLIC PANEL: CPT

## 2025-06-18 PROCEDURE — 83550 IRON BINDING TEST: CPT

## 2025-06-18 PROCEDURE — 80061 LIPID PANEL: CPT

## 2025-06-19 ENCOUNTER — RESULTS FOLLOW-UP (OUTPATIENT)
Dept: FAMILY MEDICINE CLINIC | Age: 50
End: 2025-06-19

## 2025-06-19 DIAGNOSIS — E78.2 MIXED HYPERLIPIDEMIA: ICD-10-CM

## 2025-06-23 RX ORDER — CHLORAL HYDRATE 500 MG
2000 CAPSULE ORAL DAILY
Qty: 60 CAPSULE | Refills: 3 | Status: SHIPPED | OUTPATIENT
Start: 2025-06-23

## 2025-09-03 ENCOUNTER — PATIENT MESSAGE (OUTPATIENT)
Dept: GASTROENTEROLOGY | Age: 50
End: 2025-09-03

## 2025-09-03 DIAGNOSIS — R14.0 ABDOMINAL BLOATING: ICD-10-CM

## 2025-09-03 DIAGNOSIS — R10.12 LEFT UPPER QUADRANT PAIN: Primary | ICD-10-CM

## 2025-09-03 DIAGNOSIS — K80.20 CALCULUS OF GALLBLADDER WITHOUT CHOLECYSTITIS WITHOUT OBSTRUCTION: ICD-10-CM

## 2025-09-03 DIAGNOSIS — K57.90 DIVERTICULOSIS: ICD-10-CM

## 2025-09-05 ENCOUNTER — HOSPITAL ENCOUNTER (OUTPATIENT)
Dept: ULTRASOUND IMAGING | Age: 50
Discharge: HOME | End: 2025-09-05
Payer: COMMERCIAL

## 2025-09-05 DIAGNOSIS — R14.0 ABDOMINAL BLOATING: ICD-10-CM

## 2025-09-05 DIAGNOSIS — K57.90 DIVERTICULOSIS: ICD-10-CM

## 2025-09-05 DIAGNOSIS — K80.20 CALCULUS OF GALLBLADDER WITHOUT CHOLECYSTITIS WITHOUT OBSTRUCTION: ICD-10-CM

## 2025-09-05 DIAGNOSIS — R10.12 LEFT UPPER QUADRANT PAIN: ICD-10-CM

## 2025-09-05 PROCEDURE — 76705 ECHO EXAM OF ABDOMEN: CPT

## (undated) DEVICE — BASIN EMSIS 700ML GRAPHITE PLAS KID SHP GRAD